# Patient Record
Sex: FEMALE | Race: WHITE | Employment: UNEMPLOYED | URBAN - METROPOLITAN AREA
[De-identification: names, ages, dates, MRNs, and addresses within clinical notes are randomized per-mention and may not be internally consistent; named-entity substitution may affect disease eponyms.]

---

## 2017-04-25 ENCOUNTER — OFFICE VISIT (OUTPATIENT)
Dept: OBGYN CLINIC | Age: 32
End: 2017-04-25

## 2017-04-25 VITALS
HEIGHT: 67 IN | DIASTOLIC BLOOD PRESSURE: 82 MMHG | BODY MASS INDEX: 28.72 KG/M2 | SYSTOLIC BLOOD PRESSURE: 124 MMHG | WEIGHT: 183 LBS

## 2017-04-25 DIAGNOSIS — Z01.419 ENCOUNTER FOR GYNECOLOGICAL EXAMINATION (GENERAL) (ROUTINE) WITHOUT ABNORMAL FINDINGS: Primary | ICD-10-CM

## 2017-04-25 NOTE — PATIENT INSTRUCTIONS

## 2017-04-25 NOTE — MR AVS SNAPSHOT
Visit Information Date & Time Provider Department Dept. Phone Encounter #  
 4/25/2017 10:40 AM Don Keys MD [facility] 90 [id number] Upcoming Health Maintenance Date Due INFLUENZA AGE 9 TO ADULT 8/1/2016 PAP AKA CERVICAL CYTOLOGY 4/4/2021 Allergies as of 4/25/2017  Review Complete On: 4/25/2017 By: Laila Haji LPN Severity Noted Reaction Type Reactions Cefzil [Cefprozil]  01/29/2014    Hives Tolerated z-shari, amoxicillin Current Immunizations  Reviewed on 10/31/2014 Name Date Influenza Vaccine 9/24/2014 Tdap 8/26/2014 Not reviewed this visit Vitals BP Height(growth percentile) Weight(growth percentile) LMP BMI OB Status 124/82 5' 7\" (1.702 m) 183 lb (83 kg) 04/07/2017 (Exact Date) 28.66 kg/m2 Having regular periods Smoking Status Never Smoker BMI and BSA Data Body Mass Index Body Surface Area  
 28.66 kg/m 2 1.98 m 2 Preferred Pharmacy Pharmacy Name Phone [id number] [address] [facility], [address] [place] [address]  [phone] Your Updated Medication List  
  
   
This list is accurate as of: 4/25/17 10:59 AM.  Always use your most recent med list.  
  
  
  
  
 CALCIUM PO Take  by mouth.  
  
 methyldopa 500 mg tablet Commonly known as:  ALDOMET Take 1 Tab by mouth two (2) times a day. PRENATAL VITAMIN PO Take  by mouth. ZyrTEC 10 mg Cap Generic drug:  Cetirizine Take  by mouth. Patient Instructions Well Visit, Ages 25 to 48: Care Instructions Your Care Instructions Physical exams can help you stay healthy. Your doctor has checked your overall health and may have suggested ways to take good care of yourself. He or she also may have recommended tests. At home, you can help prevent illness with healthy eating, regular exercise, and other steps. Follow-up care is a key part of your treatment and safety. Be sure to make and go to all appointments, and call your doctor if you are having problems. It's also a good idea to know your test results and keep a list of the medicines you take. How can you care for yourself at home? · Reach and stay at a healthy weight. This will lower your risk for many problems, such as obesity, diabetes, heart disease, and high blood pressure. · Get at least 30 minutes of physical activity on most days of the week. Walking is a good choice. You also may want to do other activities, such as running, swimming, cycling, or playing tennis or team sports. Discuss any changes in your exercise program with your doctor. · Do not smoke or allow others to smoke around you. If you need help quitting, talk to your doctor about stop-smoking programs and medicines. These can increase your chances of quitting for good. · Talk to your doctor about whether you have any risk factors for sexually transmitted infections (STIs). Having one sex partner (who does not have STIs and does not have sex with anyone else) is a good way to avoid these infections. · Use birth control if you do not want to have children at this time. Talk with your doctor about the choices available and what might be best for you. · Protect your skin from too much sun. When you're outdoors from 10 a.m. to 4 p.m., stay in the shade or cover up with clothing and a hat with a wide brim. Wear sunglasses that block UV rays. Even when it's cloudy, put broad-spectrum sunscreen (SPF 30 or higher) on any exposed skin. · See a dentist one or two times a year for checkups and to have your teeth cleaned. · Wear a seat belt in the car. · Drink alcohol in moderation, if at all. That means no more than 2 drinks a day for men and 1 drink a day for women. Follow your doctor's advice about when to have certain tests. These tests can spot problems early. For everyone · Cholesterol. Have the fat (cholesterol) in your blood tested after age 21. Your doctor will tell you how often to have this done based on your age, family history, or other things that can increase your risk for heart disease. · Blood pressure. Have your blood pressure checked during a routine doctor visit. Your doctor will tell you how often to check your blood pressure based on your age, your blood pressure results, and other factors. · Vision. Talk with your doctor about how often to have a glaucoma test. 
· Diabetes. Ask your doctor whether you should have tests for diabetes. · Colon cancer. Have a test for colon cancer at age 48. You may have one of several tests. If you are younger than 48, you may need a test earlier if you have any risk factors. Risk factors include whether you already had a precancerous polyp removed from your colon or whether your parent, brother, sister, or child has had colon cancer. For women · Breast exam and mammogram. Talk to your doctor about when you should have a clinical breast exam and a mammogram. Medical experts differ on whether and how often women under 50 should have these tests. Your doctor can help you decide what is right for you. · Pap test and pelvic exam. Begin Pap tests at age 24. A Pap test is the best way to find cervical cancer. The test often is part of a pelvic exam. Ask how often to have this test. 
· Tests for sexually transmitted infections (STIs). Ask whether you should have tests for STIs. You may be at risk if you have sex with more than one person, especially if your partners do not wear condoms. For men · Tests for sexually transmitted infections (STIs). Ask whether you should have tests for STIs. You may be at risk if you have sex with more than one person, especially if you do not wear a condom. · Testicular cancer exam. Ask your doctor whether you should check your testicles regularly. · Prostate exam. Talk to your doctor about whether you should have a blood test (called a PSA test) for prostate cancer. Experts differ on whether and when men should have this test. Some experts suggest it if you are older than 39 and are -American or have a father or brother who got prostate cancer when he was younger than 72. When should you call for help? Watch closely for changes in your health, and be sure to contact your doctor if you have any problems or symptoms that concern you. Where can you learn more? Go to http://shikha-aleksandar.info/. Enter P072 in the search box to learn more about \"Well Visit, Ages 25 to 48: Care Instructions. \" Current as of: July 19, 2016 Content Version: 11.2 © 4129-8087 [employer]. Care instructions adapted under license by "[employer] (which disclaims liability or warranty for this information). If you have questions about a medical condition or this instruction, always ask your healthcare professional. [place] [address] any warranty or liability for your use of this information. Introducing [facility]! Dear Kj Pascal: 
Thank you for requesting a REVShare account. Our records indicate that you already have an active REVShare account. You can access your account anytime at [email or web address]. [employer] Did you know that you can access your hospital and ER discharge instructions at any time in REVShare? You can also review all of your test results from your hospital stay or ER visit. Additional Information If you have questions, please visit the Frequently Asked Questions section of the REVShare website at [email or web address] [employer]/. Remember, REVShare is NOT to be used for urgent needs. For medical emergencies, dial 911. Now available from your iPhone and Android! Please provide this summary of care documentation to your next provider. Your primary care clinician is listed as Taina Montalvo. If you have any questions after today's visit, please call [phone].

## 2017-04-25 NOTE — PROGRESS NOTES
[facility]  [email or web address]  [phone]    Julio Tatum MD, [address] [facility]       Annual Gynecologic Exam:  WWE <40  Chief Complaint   Patient presents with    Well Woman         Sandra Nelson is a 32 y.o.  [facility] female who presents for an annual well woman exam.  Patient's last menstrual period was 2017 (exact date). .    With regard to the Gardisil vaccine, she has received all 3 injections. She does not report additional concerns today. Menstrual status:  Her periods are moderate. She does not report dysmenorrhea/painful menses. She does not report irregular bleeding. Sexual history and Contraception:  History   Sexual Activity    Sexual activity: Yes    Partners: Male    Birth control/ protection: Injection     Comment: depo     She always use condoms with sexual activity  She does not reports new sexual partner(s) in the last year. The patient does not request STD testing. We recommended testing per CDC guidelines and at patient request.     Preventive Medicine History:  Her last annual GYN exam was about one year ago. Her most recent Pap smear result: normal was obtained in 2016. Her most recent HR HPV screen was Negative obtained 1 year(s) ago. She does not have a history of NATHAN 2, 3 or cervical cancer. Past Medical History:   Diagnosis Date    Essential hypertension     Family history of Down syndrome 3/27/2014    HX OTHER MEDICAL     gardasil X3    Pap smear for cervical cancer screening 1/15/13; 2016    negative; negative, HPV negative     OB History    Para Term  AB SAB TAB Ectopic Multiple Living   1 1 1 0 0 0 0 0 0 1      # Outcome Date GA Lbr Piter/2nd Weight Sex Delivery Anes PTL Lv   1 Term 10/29/14 37w6d  6 lb 2.6 oz (2.795 kg) M VAGINAL DELI EPIDURAL AN N Y        No past surgical history on file.   Family History   Problem Relation Age of Onset    Breast Cancer Paternal Grandmother  Diabetes Maternal Grandfather     Hypertension Mother     Diabetes Other      uncle    No Known Problems Father      Social History     Social History    Marital status:      Spouse name: N/A    Number of children: N/A    Years of education: N/A     Occupational History    Not on file. Social History Main Topics    Smoking status: Never Smoker    Smokeless tobacco: Never Used    Alcohol use No    Drug use: No    Sexual activity: Yes     Partners: Male     Birth control/ protection: Injection      Comment: depo     Other Topics Concern    Not on file     Social History Narrative       Allergies   Allergen Reactions    Cefzil [Cefprozil] Hives     Tolerated z-shari, amoxicillin       Current Outpatient Prescriptions   Medication Sig    Cetirizine (ZYRTEC) 10 mg cap Take  by mouth.  CALCIUM PO Take  by mouth.  methyldopa (ALDOMET) 500 mg tablet Take 1 Tab by mouth two (2) times a day.  PRENATAL VITS W-CA,FE,FA,<1MG, (PRENATAL VITAMIN PO) Take  by mouth. No current facility-administered medications for this visit.         Patient Active Problem List   Diagnosis Code    HTN (hypertension), benign I10    [facility] E22.1       Review of Systems - History obtained from the patient  Constitutional: negative for weight loss, fever, night sweats  HEENT: negative for hearing loss, earache, congestion, snoring, sorethroat  CV: negative for chest pain, palpitations, edema  Resp: negative for cough, shortness of breath, wheezing  GI: negative for change in bowel habits, abdominal pain, black or bloody stools  : negative for frequency, dysuria, hematuria  GYN: see HPI  MSK: negative for back pain, joint pain, muscle pain  Breast: negative for breast lumps, nipple discharge, galactorrhea  Skin :negative for itching, rash, hives  Neuro: negative for dizziness, headache, confusion, weakness  Psych: negative for anxiety, depression, change in mood  Heme/lymph: negative for bleeding, bruising, pallor    Physical Exam  Visit Vitals    /82    Ht 5' 7\" (1.702 m)    Wt 183 lb (83 kg)    LMP 04/07/2017 (Exact Date)    BMI 28.66 kg/m2       Constitutional  · Appearance: well-nourished, well developed, alert, in no acute distress    HENT  · Head and Face: appears normal    Neck  · Inspection/Palpation: normal appearance, no masses or tenderness  · Lymph Nodes: no lymphadenopathy present  · Thyroid: gland size normal, nontender, no nodules or masses present on palpation    Chest  · Respiratory Effort: breathing labored  · Auscultation: normal breath sounds    Cardiovascular  · Heart:  · Auscultation: regular rate and rhythm without murmur    Breasts  · Inspection of Breasts: breasts symmetrical, no skin changes, no discharge present, nipple appearance normal, no skin retraction present  · Palpation of Breasts and Axillae: no masses present on palpation, no breast tenderness  · Axillary Lymph Nodes: no lymphadenopathy present    Gastrointestinal  · Abdominal Examination: abdomen non-tender to palpation, normal bowel sounds, no masses present  · Liver and spleen: no hepatomegaly present, spleen not palpable  · Hernias: no hernias identified    Genitourinary  · External Genitalia: normal appearance for age, no discharge present, no tenderness present, no inflammatory lesions present, no masses present  · Vagina: normal vaginal vault without central or paravaginal defects, no discharge present, no inflammatory lesions present, no masses present  · Bladder: non-tender to palpation  · Urethra: appears normal  · Cervix: normal   · Uterus: normal size, shape and consistency  · Adnexa: no adnexal tenderness present, no adnexal masses present  · Perineum: perineum within normal limits, no evidence of trauma, no rashes or skin lesions present  · Anus: anus within normal limits, no hemorrhoids present  · Inguinal Lymph Nodes: no lymphadenopathy present    Skin  · General Inspection: no rash, no lesions identified    Neurologic/Psychiatric  · Mental Status:  · Orientation: grossly oriented to person, place and time  · Mood and Affect: mood normal, affect appropriate    Assessment:  32 y.o.  for well woman exam  Encounter Diagnosis   Name Primary?  Encounter for gynecological examination (general) (routine) without abnormal findings Yes       Plan:  The patient was counseled about diet, exercise, healthy lifestyle  We discussed self breast exam  We discussed safer sex practices, condom use and risk factors for sexually transmitted diseases. We discussed current pap smear and HR HPV testing guidelines. We recommend follow up one year for routine annual gynecologic exam or sooner prn  We recommend routine follow up with her primary care doctor for management of chronic medical problems and non-gynecologic concerns  Handouts were given to the patient  We discussed calcium/vitamin D/weight bearing exercise and osteoporosis prevention  PNV  Declines contraception. Normal endo fu for elev PRL    Folllow up:  [x] return for annual well woman exam in one year or sooner if she is having problems  [] follow up and ultrasound  [] 6 months  [] 3 months  [] 6 weeks   [] 1 month    No orders of the defined types were placed in this encounter. No results found for any visits on 17.

## 2017-09-26 ENCOUNTER — OFFICE VISIT (OUTPATIENT)
Dept: ENDOCRINOLOGY | Age: 32
End: 2017-09-26

## 2017-09-26 VITALS
HEIGHT: 67 IN | HEART RATE: 99 BPM | RESPIRATION RATE: 16 BRPM | DIASTOLIC BLOOD PRESSURE: 84 MMHG | BODY MASS INDEX: 28.41 KG/M2 | SYSTOLIC BLOOD PRESSURE: 105 MMHG | WEIGHT: 181 LBS

## 2017-09-26 DIAGNOSIS — E22.1 HYPERPROLACTINEMIA (HCC): Primary | ICD-10-CM

## 2017-09-26 NOTE — PROGRESS NOTES
Endocrinology Visit    CC: elevated prolactin level    History of Present Illness:  Bashir Pereira is an 32 y.o. female with h/o hyperprolactinemia. I saw her in initial consultation in December 2016 after her Ob/gyn checked a prolactin level in September due to irregular menses and it was elevated at 24. Repeat level done in October was 33. On the labs I did, it was normal at 12 and her menses had become more regular so I did not recommend pursuing MRI or starting dopamine agonist therapy. LH, FSH, and estradiol were normal for luteal phase, and TSH/FT4 were normal. I did let her know we should consider an alternate antihypertensive (one not implicated in raising serum prolactin). Today, she reports feeling well and has lost about 18 lbs by working on diet and exercise. Her and her  are thinking about trying to conceive again in early 2018 so she wanted to follow-up today to make sure her hormone levels are optimized for fertility. She denies headaches, visual changes or decrease in peripheral vision. She denies galactorrhea. Only medications are a prenatal vitamin and methyldopa which she continues to take for HTN that was diagnosed during her first pregnancy. She is interested in fertility in the near future so decided to continue this since it is safe in pregnancy and breastfeeding. She is having regular cycles but feels like the interval between each is stretching out. She is tracking these and length is from 26-34 days, typically has normal flow for 5 days. LMP was Sept 16th. She denies change in energy level but does feel her hair is shedding more. Denies nausea, vomiting, lightheadedness, abnormal fatigue, or loss of appetite. No heat or cold intolerance, unexplained weight loss or gain. Family history is negative for pituitary tumor or other abnormalities concerning for MEN syndrome.     ROS: as above, otherwise a 7 pt review is negative     Problem list:  Patient Active Problem List Diagnosis Code    HTN (hypertension), benign I10    [facility]       Medical history:  Past Medical History:   Diagnosis Date    Essential hypertension     Family history of Down syndrome 3/27/2014    HX OTHER MEDICAL     gardasil X3    Pap smear for cervical cancer screening 1/15/13; 04/04/2016    negative; negative, HPV negative       Past surgical history:  History reviewed. No pertinent surgical history. Medications:    Current Outpatient Prescriptions:     Cetirizine (ZYRTEC) 10 mg cap, Take  by mouth., Disp: , Rfl:     CALCIUM PO, Take  by mouth., Disp: , Rfl:     methyldopa (ALDOMET) 500 mg tablet, Take 1 Tab by mouth two (2) times a day., Disp: 60 Tab, Rfl: 2    PRENATAL VITS W-CA,FE,FA,<1MG, (PRENATAL VITAMIN PO), Take  by mouth., Disp: , Rfl:     Allergies: Allergies   Allergen Reactions    Cefzil [Cefprozil] Hives     Tolerated z-shari, amoxicillin       Social History:  Social History     Social History    Marital status:      Spouse name: N/A    Number of children: N/A    Years of education: N/A     Occupational History    Not on file. Social History Main Topics    Smoking status: Never Smoker    Smokeless tobacco: Never Used    Alcohol use No    Drug use: No    Sexual activity: Yes     Partners: Male     Birth control/ protection: Condom      Comment: depo     Other Topics Concern    Not on file     Social History Narrative       Family History:  Family History   Problem Relation Age of Onset    Breast Cancer Paternal Grandmother     Diabetes Maternal Grandfather     Hypertension Mother     Diabetes Other      uncle    No Known Problems Father        Physical exam:  Visit Vitals    /84    Pulse 99    Resp 16    Ht 5' 7\" (1.702 m)    Wt 181 lb (82.1 kg)    LMP 09/16/2017    BMI 28.35 kg/m2     Gen: Healthy appearing and in NAD  Heent: Extra ocular muscles intact. No lid lag, stare. No scleral or conjunctival injection.  Mucous membranes moist and clear. Neck: supple. No supraclavicular or submandibular fullness. Thyroid: moves well with swallowing, normal size, normal texture, no masses or nodules appreciated. Heme/lymph: no cervical, supraclavicular or submandibular lymphadenopathy appreciated. Pulmonary: clear to auscultation bilaterally, no wheezes/rhonchi or rales  Cardiovascular: regular rate and rhythm, no murmurs, rubs or gallops. Extremities: no clubbing, cyanosis or edema. Neuro: no tremor of outstretched hands, reflexes are normal with normal relaxation phase. Visual fields full to confrontation. Normal gait, normal concentration, speech clear. Skin: normal texture, no rash. No significant acne or hirsutism.   Psyche: normal affect with good insight into her medical conditions    Pertinent lab review:    Component      Latest Ref Rn 10/3/2016 9/2/2016 9/2/2016          10:22 AM 10:29 AM 10:29 AM   TSH      0.450 - 4.500 uIU/mL   3.240   Prolactin      4.8 - 23.3 ng/mL 33.3 (H) 24.0 (H)      Component      Latest Ref Rn 12/8/2016 12/8/2016 12/8/2016 12/8/2016           9:41 AM  9:41 AM  9:41 AM  9:41 AM   Prolactin, Serum (ICMA)*      ng/mL       Monemeric Prolactin (ICMA)*      ng/mL       Percent Macroprolactin      %       TSH      0.450 - 4.500 uIU/mL    3.160   T4, Free      0.82 - 1.77 ng/dL    0.96   Luteinizing hormone      mIU/mL   2.3    FSH      mIU/mL   3.3    Prolactin      4.8 - 23.3 ng/mL       Estradiol      pg/mL  77.4     Insulin-Like Growth Factor I      73 - 243 ng/mL 195        Component      Latest Ref Rn 12/8/2016 12/8/2016           9:41 AM  9:41 AM   Prolactin, Serum (ICMA)*      ng/mL 15    Monemeric Prolactin (ICMA)*      ng/mL 14    Percent Macroprolactin      % 7    TSH      0.450 - 4.500 uIU/mL     T4, Free      0.82 - 1.77 ng/dL     Luteinizing hormone      mIU/mL     FSH      mIU/mL     Prolactin      4.8 - 23.3 ng/mL  16.0   Estradiol      pg/mL     Insulin-Like Growth Factor I      73 - 243 ng/mL       Assessment and Plan:  Staci Medrano is a pleasant 32 y.o. female here for h/o elevated prolactin level in the setting of irregular menses. When I saw her in December, her cycles had resumed at regular intervals and prolactin was normal. It is unclear if the period of irregular cycles was due to hyperprolactinemia or readjustment of her HPG axis after depo injections and cessation of breastfeeding. I suspect the elevated prolactin is most likely is due to her antihypertensive methyldopa. Will recheck this today along with LH and FSH (should be follicular phase range). We discussed that if prolactin is elevated, we could try bromocriptine to decrease her levels and optimize fertility. Alternatively, she could stop methyldopa (possibly switch to another antihypertensive agent safe in pregnancy like labetalol) and repeat the prolactin level in 1 month. If it remains elevated despite cessation of methyldopa will obtain pituitary MRI to evaluate for adenoma. Additionally, will recheck TSH/FT4 with thyroid antibodies. Her last TSH was above 2.5 and if this is still uptrending with positive antibodies, I would recommend starting levothyroxine. I spent 15 minutes with the patient today and > 50% of the time was spent counseling the patient about hyperprolactinemia: its pathophysiology, potential causes, diagnosis, and potential therapies. Thank you for the opportunity to partake in this patients care. RTC 6 months or sooner if needed.      Greg Khan MD  [facility]  130 Replaced by [facility]

## 2017-09-26 NOTE — MR AVS SNAPSHOT
Visit Information Date & Time Provider Department Dept. Phone Encounter #  
 9/26/2017  8:30 AM Leonarda Salazarey, [address] [facility] Diabetes and Endocrinology [phone] Follow-up Instructions Return in about 6 months (around 3/26/2018). Your Appointments 4/26/2018 10:20 AM  
ESTABLISHED PATIENT with MD Matthew Mariscal ([address]) Appt Note: annual exam TP  
 [address] [facility] [address] [facility] [address]  
[facility] [address] Upcoming Health Maintenance Date Due INFLUENZA AGE 9 TO ADULT 8/1/2017 PAP AKA CERVICAL CYTOLOGY 4/4/2021 DTaP/Tdap/Td series (2 - Td) 8/26/2024 Allergies as of 9/26/2017  Review Complete On: 9/26/2017 By: Mitchell Rivera Severity Noted Reaction Type Reactions Cefzil [Cefprozil]  01/29/2014    Hives Tolerated z-shari, amoxicillin Current Immunizations  Reviewed on 10/31/2014 Name Date Influenza Vaccine 9/24/2014 Tdap 8/26/2014 Not reviewed this visit You Were Diagnosed With   
  
 Codes Comments Hyperprolactinemia ([facility]    -  Primary ICD-10-CM: E22.1 ICD-9-CM: 253.1 Vitals BP Pulse Resp Height(growth percentile) Weight(growth percentile) LMP  
 105/84 99 16 5' 7\" (1.702 m) 181 lb (82.1 kg) 09/16/2017 BMI OB Status Smoking Status 28.35 kg/m2 Having regular periods Never Smoker Vitals History BMI and BSA Data Body Mass Index Body Surface Area  
 28.35 kg/m 2 1.97 m 2 Preferred Pharmacy Pharmacy Name Phone [id number] [phone] , [address] [place] [address]  [phone] Your Updated Medication List  
  
   
This list is accurate as of: 9/26/17  8:46 AM.  Always use your most recent med list.  
  
  
  
  
 CALCIUM PO Take  by mouth.  
  
 methyldopa 500 mg tablet Commonly known as:  ALDOMET Take 1 Tab by mouth two (2) times a day. PRENATAL VITAMIN PO Take  by mouth. ZyrTEC 10 mg Cap Generic drug:  Cetirizine Take  by mouth. We Performed the Following [facility] AND LH [07115 CPT(R)] PROLACTIN [96461 CPT(R)] THYROID ANTIBODY PANEL [97632 CPT(R)] TSH AND FREE T4 [00358 CPT(R)] Follow-up Instructions Return in about 6 months (around 3/26/2018). Introducing \[facility]\"" & [facility] SERVICES! Dear Dorys Tejeda: 
Thank you for requesting a [employer] account. Our records indicate that you already have an active [employer] account. You can access your account anytime at [email or web address] Did you know that you can access your hospital and ER discharge instructions at any time in [employer]? You can also review all of your test results from your hospital stay or ER visit. Additional Information If you have questions, please visit the Frequently Asked Questions section of the [employer] website at [email or web address] Remember, [employer] is NOT to be used for urgent needs. For medical emergencies, dial 911. Now available from your iPhone and Android! Please provide this summary of care documentation to your next provider. Your primary care clinician is listed as Maggie Lyon. If you have any questions after today's visit, please call [phone].

## 2017-09-27 ENCOUNTER — PATIENT MESSAGE (OUTPATIENT)
Dept: ENDOCRINOLOGY | Age: 32
End: 2017-09-27

## 2017-09-27 DIAGNOSIS — E22.1 HYPERPROLACTINEMIA (HCC): Primary | ICD-10-CM

## 2017-09-27 LAB
FSH SERPL-ACNC: 5.6 MIU/ML
LH SERPL-ACNC: 5.3 MIU/ML
PROLACTIN SERPL-MCNC: 30.9 NG/ML (ref 4.8–23.3)
T4 FREE SERPL-MCNC: 1.28 NG/DL (ref 0.82–1.77)
THYROGLOB AB SERPL-ACNC: <1 IU/ML (ref 0–0.9)
THYROPEROXIDASE AB SERPL-ACNC: 10 IU/ML (ref 0–34)
TSH SERPL DL<=0.005 MIU/L-ACNC: 2.46 UIU/ML (ref 0.45–4.5)

## 2017-10-02 NOTE — TELEPHONE ENCOUNTER
From: Dom Lyons MD  To: Shruti Baptiste  Sent: 9/27/2017 8:44 AM EDT  Subject: results    Hi Sandra,  Regarding the elevated prolactin, I suspect this is most likely due to the methyldopa. The only way to find out is to have you stop the medication for a few weeks and repeat the level. We could possibly switch you to labetolol, another antihypertensive which is safe during pregnancy. If this takes care of the prolactin elevation, it may prevent us from having to start bromocriptine. Let me know your thoughts.   Thanks, Ray Guzman

## 2017-11-30 LAB — PROLACTIN SERPL-MCNC: 10.7 NG/ML (ref 4.8–23.3)

## 2017-12-04 ENCOUNTER — OFFICE VISIT (OUTPATIENT)
Dept: ENDOCRINOLOGY | Age: 32
End: 2017-12-04

## 2017-12-04 VITALS
HEART RATE: 100 BPM | BODY MASS INDEX: 30.76 KG/M2 | RESPIRATION RATE: 16 BRPM | DIASTOLIC BLOOD PRESSURE: 100 MMHG | HEIGHT: 67 IN | OXYGEN SATURATION: 99 % | WEIGHT: 196 LBS | SYSTOLIC BLOOD PRESSURE: 133 MMHG

## 2017-12-04 DIAGNOSIS — E22.1 HYPERPROLACTINEMIA (HCC): Primary | ICD-10-CM

## 2017-12-04 DIAGNOSIS — I10 HTN (HYPERTENSION), BENIGN: ICD-10-CM

## 2017-12-04 NOTE — PROGRESS NOTES
Lab Results   Component Value Date/Time    TSH 2.460 09/26/2017 09:22 AM    T4, Free 1.28 09/26/2017 09:22 AM

## 2017-12-04 NOTE — PROGRESS NOTES
Endocrinology Visit    CC: elevated prolactin level    History of Present Illness:  Cece Chavez is an 28 y.o. female with h/o hyperprolactinemia. I saw her in initial consultation in December 2016 after her Ob/gyn checked a prolactin level in September due to irregular menses and it was elevated at 24. Repeat level done in October was 33. On the labs I did, it was normal at 12 and her menses had become more regular so I did not recommend pursuing MRI or starting dopamine agonist therapy. LH, FSH, and estradiol were normal for luteal phase, and TSH/FT4 were normal. We discussed a trial of stopping the methyldopa to see if prolactin would decrease off the medication. She had the bloodwork done last week and prolactin was normal at 10. She stopped taking the methyldopa shortly after her last visit in late Sept. She is not on an alternative BP agent and blood pressure readings at home have ranged in the 759M-590C systolic, 64O-61I diastolic. She had lost 18 lbs at her last visit with me but has since regained about 15 lbs which she attributes to a lapse in her diet/exercise routine that helped her lose weight. Her and her  are thinking about trying to conceive again in early 2018 so she wanted to follow-up today to make sure her hormone levels are optimized for fertility. She denies headaches, visual changes or decrease in peripheral vision. She denies galactorrhea. Only taking a prenatal vitamin at present. She is having regular cycles - feels like the interval is now getting back to normal Q28d. LMP was Nov 19th. Denies nausea, vomiting, lightheadedness, abnormal fatigue, or loss of appetite.     ROS: as above, otherwise a 7 pt review is negative     Problem list:  Patient Active Problem List   Diagnosis Code    HTN (hypertension), benign I10    Hyperprolactinemia ([facility] E22.1       Medical history:  Past Medical History:   Diagnosis Date    Essential hypertension     Family history of Down syndrome 3/27/2014    HX OTHER MEDICAL     gardasil X3    Pap smear for cervical cancer screening 1/15/13; 04/04/2016    negative; negative, HPV negative       Past surgical history:  History reviewed. No pertinent surgical history. Medications:    Current Outpatient Prescriptions:     Cetirizine (ZYRTEC) 10 mg cap, Take  by mouth., Disp: , Rfl:     CALCIUM PO, Take  by mouth., Disp: , Rfl:     PRENATAL VITS W-CA,FE,FA,<1MG, (PRENATAL VITAMIN PO), Take  by mouth., Disp: , Rfl:     Allergies: Allergies   Allergen Reactions    Cefzil [Cefprozil] Hives     Tolerated z-shari, amoxicillin       Social History:  Social History     Social History    Marital status:      Spouse name: N/A    Number of children: N/A    Years of education: N/A     Occupational History    Not on file. Social History Main Topics    Smoking status: Never Smoker    Smokeless tobacco: Never Used    Alcohol use No    Drug use: No    Sexual activity: Yes     Partners: Male     Birth control/ protection: Condom      Comment: depo     Other Topics Concern    Not on file     Social History Narrative       Family History:  Family History   Problem Relation Age of Onset    Breast Cancer Paternal Grandmother     Diabetes Maternal Grandfather     Hypertension Mother     Diabetes Other      uncle    No Known Problems Father        Physical exam:  Visit Vitals    BP (!) 133/100    Pulse 100    Resp 16    Ht 5' 7\" (1.702 m)    Wt 196 lb (88.9 kg)    LMP 11/19/2017    SpO2 99%    BMI 30.7 kg/m2     Gen: Healthy appearing and in NAD  Heent: Extra ocular muscles intact. No lid lag, stare. No scleral or conjunctival injection. Mucous membranes moist and clear. Neck: supple. No supraclavicular or submandibular fullness. Thyroid: moves well with swallowing, normal size, normal texture, no masses or nodules appreciated. Heme/lymph: no cervical, supraclavicular or submandibular lymphadenopathy appreciated.   Pulmonary: clear to auscultation bilaterally, no wheezes/rhonchi or rales  Cardiovascular: regular rate and rhythm, no murmurs, rubs or gallops. Extremities: no clubbing, cyanosis or edema. Neuro: no tremor of outstretched hands, reflexes are normal with normal relaxation phase. Visual fields full to confrontation. Normal gait, normal concentration, speech clear. Skin: normal texture, no rash. No significant acne or hirsutism. Psyche: normal affect with good insight into her medical conditions    Pertinent lab review:    Component      Latest Ref Rng & Units 11/29/2017 9/26/2017 12/8/2016 10/3/2016           9:02 AM  9:22 AM  9:41 AM 10:22 AM   Prolactin      4.8 - 23.3 ng/mL 10.7 30.9 (H) 16.0 33.3 (H)     Component      Latest Ref Rng & Units 9/2/2016          10:29 AM   Prolactin      4.8 - 23.3 ng/mL 24.0 (H)       Assessment and Plan:  Stacia Hui is a pleasant 28 y.o. female here for h/o elevated prolactin level in the setting of irregular menses and methyldopa use. I suspect the elevated prolactin was most likely is due to her antihypertensive methyldopa, since it has normalized since stopping the medication. Would not resume methlydopa unless needed during pregnancy, since she is interested in fertility and high prolactin levels can pose a barrier to conceiving. For now, encouraged weight loss with lifestyle measures to help decrease her blood pressure. Labetalol could be considered instead of methyldopa should she need an agent to control BP while pursuing fertility. I spent 15 minutes with the patient today and > 50% of the time was spent counseling the patient about hyperprolactinemia: its pathophysiology, potential causes, diagnosis, and potential therapies. Thank you for the opportunity to partake in this patients care. RTC as needed.      Radha Cuellar MD  [address] [place] [address] Diabetes & Endocrinology  [facility]

## 2018-04-26 ENCOUNTER — OFFICE VISIT (OUTPATIENT)
Dept: OBGYN CLINIC | Age: 33
End: 2018-04-26

## 2018-04-26 VITALS
WEIGHT: 198 LBS | SYSTOLIC BLOOD PRESSURE: 126 MMHG | HEIGHT: 67 IN | DIASTOLIC BLOOD PRESSURE: 84 MMHG | BODY MASS INDEX: 31.08 KG/M2

## 2018-04-26 DIAGNOSIS — Z34.80 PRENATAL CARE OF MULTIGRAVIDA, ANTEPARTUM: ICD-10-CM

## 2018-04-26 DIAGNOSIS — O16.9 HYPERTENSION DURING PREGNANCY, ANTEPARTUM, UNSPECIFIED HYPERTENSION IN PREGNANCY TYPE: Primary | ICD-10-CM

## 2018-04-26 LAB
ANTIBODY SCREEN, EXTERNAL: NEGATIVE
CHLAMYDIA, EXTERNAL: NEGATIVE
HBSAG, EXTERNAL: NEGATIVE
HCT, EXTERNAL: 40
HGB, EXTERNAL: 13.6
HIV, EXTERNAL: NON REACTIVE
N. GONORRHEA, EXTERNAL: NEGATIVE
PLATELET CNT,   EXTERNAL: 315
RUBELLA, EXTERNAL: NORMAL
RUBELLA, EXTERNAL: NORMAL
T. PALLIDUM, EXTERNAL: NORMAL

## 2018-04-26 NOTE — PROGRESS NOTES
[address] [facility]  [email or web address]  [phone]    Jyoti Centeno MD, [address] [facility]     Chief complaint:  Irregular cycles  Last cycle; Patient's last menstrual period was 2018 (exact date). This is a new concern and an evaluation is planned. [place] d/c because link of inc PRL. Current pregnancy history:  Rosi Cerna is a , 28 y.o. female [facility]   She presents for the evaluation of irregular menses and a positive pregnancy test.    LMP history:  Patient's last menstrual period was 2018 (exact date). .  The date of the beginning of her last menstrual period is certain. Her menses are regular. Her cycles occur about every 4 weeks. A urine pregnancy test was positive about 4 weeks ago. She was not using contraception at the estimated time of conception. Based on her LMP, her EGA is 9 weeks,3 days with and EDC of 18. Ultrasound data:  She had an ultrasound today which revealed a viable garcias pregnancy with a gestational age of 10 weeks and 1 days giving an EDC of 18. Pregnancy symptoms:  She reports urinary frequency, breast tenderness, nausea. She denies vomiting, pelvic pain, bleeding. Since she found out she is pregnant, she has gained, 5 lbs. She reports her prepregnancy weight as 193 pounds. Relevant past pregnancy history:  She has the following pregnancy history:none. She does not have a history of  delivery. She does not have a history of a prior  section. Relevant past medical history:(relevant to this pregnancy): CF testing negative 3/27/2014    none     Pap smear history:  Last pap smear: 2016  Results: within normal limits    Occupational history  Her occupation is: unemployed. Substance history:   She does not report current tobacco use. She does not report current alcohol use. She does not report current drug use. Exposure history:    There are not indoor cat(s) in the home. The patient was instructed not to change cat litter boxes during pregnancy. She does not report close contact with children on a regular basis. She has chicken pox or the vaccine in the past.   Patient does not report issues with domestic violence. Genetic Screening/Teratology Counseling:   (Includes patient, baby's father, or anyone in either family with:)  3.  Patient's age >/= 28 at EDC?--32      FOB age: 28years old. 2.  Thalassemia ([facility], [facility], [facility], or  background): MCV<80?--no  3. Neural tube defect (meningomyelocele, spina bifida, anencephaly)? --no  4. Congenital heart defect?--no  5. Down syndrome?--first cousin male with DS  6.  Jerson-Sachs ([address] [place] [address])? --no  7. Canavan's Disease?--no  8. Familial Dysautonomia?--no   9. Sickle cell disease or trait ()? --no   Has she been tested for sickle trait: No  10. Hemophilia or other blood disorders?--no  11. Muscular dystrophy?--no  12. Cystic fibrosis? --no  13. Craighead's Chorea?--no  14. Mental retardation/autism (if yes was person tested for Fragile X)?-no  15. Other inherited genetic or chromosomal disorder?- no  16. Maternal metabolic disorder (DM, PKU, etc)? --no  17. Patient or FOB with a child with a birth defect not listed above?--no  17a. Patient or FOB with a birth defect themselves?--no  25. Recurrent pregnancy loss, or stillbirth?--no  19. Any medications since LMP other than prenatal vitamins (include vitamins, supplements, OTC meds, drugs, alcohol)? -- PNV  20. Any other genetic/environmental exposure to discuss?--no. Infection History:  1. Lives with someone with TB or TB exposed?--no  2. Patient or partner has history of genital herpes?--no  3. Rash or viral illness since LMP?--no  4. History of STD (GC, CT, HPV, syphilis, HIV)? --no  5. Have you received a flu vaccine for the most recent flu season? -- yes  6.   Have you or your sexual partner(s) travelled to a [place] area in the last 6 months? -- no    Past Medical History:   Diagnosis Date    Essential hypertension     Family history of Down syndrome 3/27/2014    HX OTHER MEDICAL     gardasil X3    Pap smear for cervical cancer screening 1/15/13; 2016    negative; negative, HPV negative     No past surgical history on file. Social History     Occupational History    Not on file. Social History Main Topics    Smoking status: Never Smoker    Smokeless tobacco: Never Used    Alcohol use No    Drug use: No    Sexual activity: Yes     Partners: Male     Birth control/ protection: Condom      Comment: depo     Family History   Problem Relation Age of Onset    Breast Cancer Paternal Grandmother     Diabetes Maternal Grandfather     Hypertension Mother     Diabetes Other      uncle    No Known Problems Father      OB History    Para Term  AB Living   1 1 1 0 0 1   SAB TAB Ectopic Molar Multiple Live Births   0 0 0  0 1      # Outcome Date GA Lbr Piter/2nd Weight Sex Delivery Anes PTL Lv   1 Term 10/29/14 37w6d  6 lb 2.6 oz (2.795 kg) M VAGINAL DELI EPIDURAL AN N ARTHUR        Allergies   Allergen Reactions    Cefzil [Cefprozil] Hives     Tolerated z-shari, amoxicillin     Prior to Admission medications    Medication Sig Start Date End Date Taking? Authorizing Provider   CALCIUM PO Take  by mouth. Yes Historical Provider   PRENATAL VITS W-CA,FE,FA,<1MG, (PRENATAL VITAMIN PO) Take  by mouth. Yes Historical Provider   Cetirizine (ZYRTEC) 10 mg cap Take  by mouth.     Historical Provider        Review of Systems - History obtained from the patient  Constitutional: negative for weight loss, fever, night sweats  HEENT: negative for hearing loss, earache, congestion, snoring, sorethroat  CV: negative for chest pain, palpitations, edema  Resp: negative for cough, shortness of breath, wheezing  GI: negative for change in bowel habits, abdominal pain, black or bloody stools  : negative for frequency, dysuria, hematuria, vaginal discharge  MSK: negative for back pain, joint pain, muscle pain  Breast: negative for breast lumps, nipple discharge, galactorrhea  Skin :negative for itching, rash, hives  Neuro: negative for dizziness, headache, confusion, weakness  Psych: negative for anxiety, depression, change in mood  Heme/lymph: negative for bleeding, bruising, pallor    Objective:  Visit Vitals    /84 (BP 1 Location: Left arm)    Ht 5' 7\" (1.702 m)    Wt 198 lb (89.8 kg)    LMP 02/19/2018 (Exact Date)    BMI 31.01 kg/m2       Physical Exam:   Constitutional  · Appearance: well-nourished, well developed, alert, in no acute distress    HENT  · Head  · Face: appears normal  · Eyes: appear normal  · Ears: normal  · Mouth: normal  · Lips: no lesions    Neck  · Inspection/Palpation: normal appearance, no masses or tenderness  · Lymph Nodes: no lymphadenopathy present  · Thyroid: gland size normal, nontender, no nodules or masses present on palpation    Chest  · Respiratory Effort: breathing unlabored  · Auscultation: normal breath sounds    Cardiovascular  · Heart:  · Auscultation: regular rate and rhythm without murmur    Breasts  · Inspection of Breasts: breasts symmetrical, no skin changes, no discharge present, nipple appearance normal, no skin retraction present  · Palpation of Breasts and Axillae: no masses present on palpation, no breast tenderness  · Axillary Lymph Nodes: no lymphadenopathy present    Gastrointestinal  · Abdominal Examination: abdomen non-tender to palpation, normal bowel sounds, no masses present  · Liver and spleen: no hepatomegaly present, spleen not palpable  · Hernias: no hernias identified    Genitourinary  · External Genitalia: normal appearance for age, no discharge present, no tenderness present, no inflammatory lesions present, no masses present, no atrophy present  · Vagina: normal vaginal vault without central or paravaginal defects, no discharge present, no inflammatory lesions present, no masses present  · Bladder: non-tender to palpation  · Urethra: appears normal  · Cervix: normal appearing with discharge or lesions, os closed  · Uterus: enlarged, normal shape, soft  · Adnexa: no adnexal tenderness present, no adnexal masses present  · Perineum: perineum within normal limits, no evidence of trauma, no rashes or skin lesions present  · Anus: anus within normal limits, no hemorrhoids present  · Inguinal Lymph Nodes: no lymphadenopathy present    Skin  · General Inspection: no rash, no lesions identified    Neurologic/Psychiatric  · Mental Status:  · Orientation: grossly oriented to person, place and time  · Mood and Affect: mood normal, affect appropriate    Assessment:   Irregular cycles  Encounter Diagnoses   Name Primary?  Hypertension during pregnancy, antepartum, unspecified hypertension in pregnancy type Yes    Prenatal care of multigravida, antepartum      Due date: LMP EDC    Plan:   We discussed options of genetic screening and diagnostic testing including:  CF testing, CVS, amniocentesis first trimester screening/NT, MSAFP, and NIPT (handout given to patient for review and consent)  She is not interested in prenatal genetic testing of her fetus. She is considering NT but wants to check on coverage  acog exercise h/o  Baseline 24 hr urine  Plan: FS  The course of pregnancy discussed including visit schedule, ultrasounds, lab testing, etc.  Pt advised to avoid alcoholic beverages and illicit/recreational drugs use  Recommend taking prenatal vitamins or folic acid daily with DHA/fish oil. The hospital and practice style discussed with coverage system. We discussed nutrition, toxoplasmosis precautions, sexual activity, exercise, need for influenza vaccine, environmental and work hazards, travel advice, screen for domestic violence, need for seat belts.   We discussed seafood, unpasteurized dairy products, deli meat, artificial sweeteners, and caffeine intake. We recommend avoiding chemical and toxin exposures when possible. Information on prenatal and breastfeeding classes given. Information on circumcision given  Patient encouraged not to smoke. Discussed current prescription drug use. Given medication list.  Discussed the use of over the counter medications and chemicals. She is advised to contact her MD with any questions. Pt understands risk of hemorrhage during pregnancy and post delivery and would accept blood products if necessary in life-threatening emergencies  We discussed signs and symptoms of abnormal pregnancies and miscarriage. Handouts given to pt. Pt considering NT    Physician review of ultrasound performed by technician  Today's ultrasound report and images were reviewed and discussed with the patient. Please see images and imaging report entered by technician in PACS for more detail and progress note and diagnosis entered by MD.    Renetta Spence MD    Orders Placed This Encounter    CULTURE, URINE    CHLAMYDIA/GC PCR    HEP B SURFACE AG    HIV SCREEN, [address] [place] [address]. W/REFLEX CONFIRM    CBC W/O DIFF    RUBELLA AB, IGG    T PALLIDUM SCREEN W/REFLEX    TYPE, ABO & RH    ANTIBODY SCREEN     Follow-up Disposition:  Return in about 4 weeks (around 5/24/2018) for Follow up OB visit. TA ULTRASOUND PERFORMED  A SINGLE VIABLE 9W1D IUP IS SEEN WITH NORMAL CARDIAC RHYTHM. GESTATIONAL AGE BASED ON TODAYS ULTRASOUND. A NORMAL YOLK SAC IS SEEN. RIGHT OVARY APPEARS WITHIN NORMAL LIMITS. LEFT OVARY APPEARS WITHIN NORMAL LIMITS. NO FREE FLUID SEEN IN THE CDS.

## 2018-04-26 NOTE — PATIENT INSTRUCTIONS
Weeks 6 to 10 of Your Pregnancy: Care Instructions  Your Care Instructions    Congratulations on your pregnancy. This is an exciting and important time for you. During the first 6 to 10 weeks of your pregnancy, your body goes through many changes. Your baby grows very fast, even though you cannot feel it yet. You may start to notice that you feel different, both in your body and your emotions. Because each woman's pregnancy is unique, there is no right way to feel. You may feel the healthiest you have ever been, or you may feel tired or sick to your stomach (\"morning sickness\"). These early weeks are a time to make healthy choices and to eat the best foods for you and your baby. This care sheet will give you some ideas. This is also a good time to think about birth defects testing. These are tests done during pregnancy to look for possible problems with the baby. First trimester tests for birth defects can be done between 8 and 17 weeks of pregnancy, depending on the test. Talk with your doctor about what kinds of tests are available. Follow-up care is a key part of your treatment and safety. Be sure to make and go to all appointments, and call your doctor if you are having problems. It's also a good idea to know your test results and keep a list of the medicines you take. How can you care for yourself at home? Eat well  · Eat at least 3 meals and 2 healthy snacks every day. Eat fresh, whole foods, including:  ¨ 7 or more servings of bread, tortillas, cereal, rice, pasta, or oatmeal.  ¨ 3 or more servings of vegetables, especially leafy green vegetables. ¨ 2 or more servings of fruits. ¨ 3 or more servings of milk, yogurt, or cheese. ¨ 2 or more servings of meat, turkey, chicken, fish, eggs, or dried beans. · Drink plenty of fluids, especially water. Avoid sodas and other sweetened drinks. · Choose foods that have important vitamins for your baby, such as calcium, iron, and folate.   ¨ Dairy products, tofu, canned fish with bones, almonds, broccoli, dark leafy greens, corn tortillas, and fortified orange juice are good sources of calcium. ¨ Beef, poultry, liver, spinach, lentils, dried beans, fortified cereals, and dried fruits are rich in iron. ¨ Dark leafy greens, broccoli, asparagus, liver, fortified cereals, orange juice, peanuts, and almonds are good sources of folate. · Avoid foods that could harm your baby. ¨ Do not eat raw or undercooked meat, chicken, or fish (such as sushi or raw oysters). ¨ Do not eat raw eggs or foods that contain raw eggs, such as Caesar dressing. ¨ Do not eat soft cheeses and unpasteurized dairy foods, such as Brie, feta, or blue cheese. ¨ Do not eat fish that contains a lot of mercury, such as shark, swordfish, tilefish, or anthony mackerel. Do not eat more than 6 ounces of tuna each week. ¨ Do not eat raw sprouts, especially alfalfa sprouts. ¨ Cut down on caffeine, such as coffee, tea, and cola. Protect yourself and your baby  · Do not touch steve litter or cat feces. They can cause an infection that could harm your baby. · High body temperature can be harmful to your baby. So if you want to use a sauna or hot tub, be sure to talk to your doctor about how to use it safely. [place] with morning sickness  · Sip small amounts of water, juices, or shakes. Try drinking between meals, not with meals. · Eat 5 or 6 small meals a day. Try dry toast or crackers when you first get up, and eat breakfast a little later. · Avoid spicy, greasy, and fatty foods. · When you feel sick, open your windows or go for a short walk to get fresh air. · Try nausea wristbands. These help some women. · Tell your doctor if you think your prenatal vitamins make you sick. Where can you learn more? Go to http://katerin.info/. Enter G112 in the search box to learn more about \"Weeks 6 to 10 of Your Pregnancy: Care Instructions. \"  Current as of: March 16, 2017  Content Version: 11.4  © 1391-0101 Healthwise, Incorporated. Care instructions adapted under license by [employer] (which disclaims liability or warranty for this information). If you have questions about a medical condition or this instruction, always ask your healthcare professional. [address] any warranty or liability for your use of this information.

## 2018-04-26 NOTE — MR AVS SNAPSHOT
[address] [facility] [address] [facility] 
[phone] Patient: Poly Wright MRN: [id number] UOS:[birth year] Visit Information Date & Time Provider Department Dept. Phone Encounter #  
 4/26/2018  9:40 AM MD Matthew Colon [phone] [id number] Follow-up Instructions Return in about 4 weeks (around 5/24/2018) for Follow up OB visit. Your Appointments 4/26/2018 10:20 AM  
ESTABLISHED PATIENT with MD Matthew Colon ([address]) Appt Note: annual exam TP  
 [address] [facility] 99 [address]  
[facility] 31 [facility]3 [address] Upcoming Health Maintenance Date Due Influenza Age 5 to Adult 8/1/2017 PAP AKA CERVICAL CYTOLOGY 4/4/2021 Allergies as of 4/26/2018  Review Complete On: 4/26/2018 By: Miguel Smith MD  
  
 Severity Noted Reaction Type Reactions Cefzil [Cefprozil]  01/29/2014    Hives Tolerated z-shari, amoxicillin Current Immunizations  Reviewed on 10/31/2014 Name Date Influenza Vaccine 9/24/2014 Tdap 8/26/2014 Not reviewed this visit You Were Diagnosed With   
  
 Codes Comments Hypertension during pregnancy, antepartum, unspecified hypertension in pregnancy type    -  Primary ICD-10-CM: O16.9 ICD-9-CM: 642.93 Prenatal care of multigravida, antepartum     ICD-10-CM: Z34.80 ICD-9-CM: V22.1 Vitals BP Height(growth percentile) Weight(growth percentile) LMP BMI OB Status 126/84 (BP 1 Location: Left arm) 5' 7\" (1.702 m) 198 lb (89.8 kg) 02/19/2018 (Exact Date) 31.01 kg/m2 Having regular periods Smoking Status Never Smoker BMI and BSA Data Body Mass Index Body Surface Area 31.01 kg/m 2 2.06 m 2 Preferred Pharmacy Pharmacy Name Phone [id number] [address], [address] [place] [address] Dr [phone] Your Updated Medication List  
  
   
This list is accurate as of 4/26/18  9:41 AM.  Always use your most recent med list.  
  
  
  
  
 CALCIUM PO Take  by mouth. PRENATAL VITAMIN PO Take  by mouth. ZyrTEC 10 mg Cap Generic drug:  Cetirizine Take  by mouth. We Performed the Following ANTIBODY SCREEN [id number] CPT(R)] BLOOD TYPE, (ABO+RH) [75168 CPT(R)] CBC W/O DIFF [59537 CPT(R)] CHLAMYDIA/GC PCR [54967 CPT(R)] CULTURE, URINE [id number] CPT(R)] HEP B SURFACE AG [id number] CPT(R)] HIV 1/2 AG/AB, 4TH GENERATION,W RFLX CONFIRM [id number] CPT(R)] RUBELLA AB, IGG [id number] CPT(R)] T PALLIDUM SCREEN W/REFLEX [[id number] Custom] Follow-up Instructions Return in about 4 weeks (around 5/24/2018) for Follow up OB visit. Patient Instructions Weeks 6 to 10 of Your Pregnancy: Care Instructions Your Care Instructions Congratulations on your pregnancy. This is an exciting and important time for you. During the first 6 to 10 weeks of your pregnancy, your body goes through many changes. Your baby grows very fast, even though you cannot feel it yet. You may start to notice that you feel different, both in your body and your emotions. Because each woman's pregnancy is unique, there is no right way to feel. You may feel the healthiest you have ever been, or you may feel tired or sick to your stomach (\"morning sickness\"). These early weeks are a time to make healthy choices and to eat the best foods for you and your baby. This care sheet will give you some ideas. This is also a good time to think about birth defects testing. These are tests done during pregnancy to look for possible problems with the baby.  First trimester tests for birth defects can be done between 8 and 17 weeks of pregnancy, depending on the test. Talk with your doctor about what kinds of tests are available. Follow-up care is a key part of your treatment and safety. Be sure to make and go to all appointments, and call your doctor if you are having problems. It's also a good idea to know your test results and keep a list of the medicines you take. How can you care for yourself at home? Eat well · Eat at least 3 meals and 2 healthy snacks every day. Eat fresh, whole foods, including: ¨ 7 or more servings of bread, tortillas, cereal, rice, pasta, or oatmeal. 
¨ 3 or more servings of vegetables, especially leafy green vegetables. ¨ 2 or more servings of fruits. ¨ 3 or more servings of milk, yogurt, or cheese. ¨ 2 or more servings of meat, turkey, chicken, fish, eggs, or dried beans. · Drink plenty of fluids, especially water. Avoid sodas and other sweetened drinks. · Choose foods that have important vitamins for your baby, such as calcium, iron, and folate. ¨ Dairy products, tofu, canned fish with bones, almonds, broccoli, dark leafy greens, corn tortillas, and fortified orange juice are good sources of calcium. ¨ Beef, poultry, liver, spinach, lentils, dried beans, fortified cereals, and dried fruits are rich in iron. ¨ Dark leafy greens, broccoli, asparagus, liver, fortified cereals, orange juice, peanuts, and almonds are good sources of folate. · Avoid foods that could harm your baby. ¨ Do not eat raw or undercooked meat, chicken, or fish (such as sushi or raw oysters). ¨ Do not eat raw eggs or foods that contain raw eggs, such as Caesar dressing. ¨ Do not eat soft cheeses and unpasteurized dairy foods, such as Brie, feta, or blue cheese. ¨ Do not eat fish that contains a lot of mercury, such as shark, swordfish, tilefish, or anthony mackerel. Do not eat more than 6 ounces of tuna each week. ¨ Do not eat raw sprouts, especially alfalfa sprouts. ¨ Cut down on caffeine, such as coffee, tea, and cola. Protect yourself and your baby · Do not touch steve litter or cat feces. They can cause an infection that could harm your baby. · High body temperature can be harmful to your baby. So if you want to use a sauna or hot tub, be sure to talk to your doctor about how to use it safely. [place] with morning sickness · Sip small amounts of water, juices, or shakes. Try drinking between meals, not with meals. · Eat 5 or 6 small meals a day. Try dry toast or crackers when you first get up, and eat breakfast a little later. · Avoid spicy, greasy, and fatty foods. · When you feel sick, open your windows or go for a short walk to get fresh air. · Try nausea wristbands. These help some women. · Tell your doctor if you think your prenatal vitamins make you sick. Where can you learn more? Go to http://shikha-aleksandar.info/. Enter G112 in the search box to learn more about \"Weeks 6 to 10 of Your Pregnancy: Care Instructions. \" Current as of: March 16, 2017 Content Version: 11.4 © 0260-1052 [employer]. Care instructions adapted under license by Typerings.[employer] (which disclaims liability or warranty for this information). If you have questions about a medical condition or this instruction, always ask your healthcare professional. [address] any warranty or liability for your use of this information. Introducing [facility] & HEALTH SERVICES! Dear [facility] HAMLET: 
Thank you for requesting a SnapLayout account. Our records indicate that you already have an active SnapLayout account. You can access your account anytime at [email or web address] Did you know that you can access your hospital and ER discharge instructions at any time in SnapLayout? You can also review all of your test results from your hospital stay or ER visit. Additional Information If you have questions, please visit the Frequently Asked Questions section of the SnapLayout website at [email or web address] Remember, [employer] is NOT to be used for urgent needs. For medical emergencies, dial 911. Now available from your iPhone and Android! Please provide this summary of care documentation to your next provider. Your primary care clinician is listed as Veronica Kat. If you have any questions after today's visit, please call [phone].

## 2018-04-27 LAB
BACTERIA UR CULT: NO GROWTH
URINALYSIS, EXTERNAL: NORMAL

## 2018-04-28 LAB
ABO GROUP BLD: NORMAL
BLD GP AB SCN SERPL QL: NEGATIVE
C TRACH RRNA SPEC QL NAA+PROBE: NEGATIVE
ERYTHROCYTE [DISTWIDTH] IN BLOOD BY AUTOMATED COUNT: 13.1 % (ref 12.3–15.4)
HBV SURFACE AG SERPL QL IA: NEGATIVE
HCT VFR BLD AUTO: 40 % (ref 34–46.6)
HGB BLD-MCNC: 13.6 G/DL (ref 11.1–15.9)
HIV 1+2 AB+HIV1 P24 AG SERPL QL IA: NON REACTIVE
MCH RBC QN AUTO: 30.3 PG (ref 26.6–33)
MCHC RBC AUTO-ENTMCNC: 34 G/DL (ref 31.5–35.7)
MCV RBC AUTO: 89 FL (ref 79–97)
N GONORRHOEA RRNA SPEC QL NAA+PROBE: NEGATIVE
PLATELET # BLD AUTO: 315 X10E3/UL (ref 150–379)
RBC # BLD AUTO: 4.49 X10E6/UL (ref 3.77–5.28)
RH BLD: POSITIVE
RUBV IGG SERPL IA-ACNC: 6.91 INDEX
T PALLIDUM AB SER QL IA: NEGATIVE
WBC # BLD AUTO: 10.2 X10E3/UL (ref 3.4–10.8)

## 2018-05-09 ENCOUNTER — TELEPHONE (OUTPATIENT)
Dept: OBGYN CLINIC | Age: 33
End: 2018-05-09

## 2018-05-09 DIAGNOSIS — O16.9 HYPERTENSION AFFECTING PREGNANCY, ANTEPARTUM: Primary | ICD-10-CM

## 2018-05-10 LAB
CREAT 24H UR-MRATE: 1019 MG/24 HR (ref 800–1800)
CREAT UR-MCNC: 46.3 MG/DL
PROT 24H UR-MRATE: <88 MG/24 HR (ref 30–150)
PROT UR-MCNC: <4 MG/DL

## 2018-05-14 NOTE — TELEPHONE ENCOUNTER
Normal results, add to prenatal records. We can review in detail with patient at next visit.   Add to PL

## 2018-05-17 ENCOUNTER — ROUTINE PRENATAL (OUTPATIENT)
Dept: OBGYN CLINIC | Age: 33
End: 2018-05-17

## 2018-05-17 VITALS
BODY MASS INDEX: 30.76 KG/M2 | SYSTOLIC BLOOD PRESSURE: 130 MMHG | DIASTOLIC BLOOD PRESSURE: 90 MMHG | HEIGHT: 67 IN | WEIGHT: 196 LBS

## 2018-05-17 DIAGNOSIS — O10.019 PRE-EXISTING ESSENTIAL HYPERTENSION DURING PREGNANCY, ANTEPARTUM: Primary | ICD-10-CM

## 2018-05-17 DIAGNOSIS — Z3A.12 12 WEEKS GESTATION OF PREGNANCY: ICD-10-CM

## 2018-05-17 DIAGNOSIS — Z34.80 PRENATAL CARE OF MULTIGRAVIDA, ANTEPARTUM: ICD-10-CM

## 2018-05-17 NOTE — MR AVS SNAPSHOT
[address] [place] [address] 
[phone] Patient: Tala Neumann MRN: [id number] ESW:17[birth year] Visit Information Date & Time Provider Department Dept. Phone Encounter #  
 5/17/2018  1:50 PM MD Matthew [place] [phone] Follow-up Instructions Return in about 4 weeks (around 6/14/2018) for Follow up OB visit. 6/13/2018  9:20 AM  
OB VISIT with MD Matthew [place] ([facility]) Appt Note: 4wk fob  
 [address] [facility] [address] [facility] [address]  
[address] Upcoming Health Maintenance Date Due Influenza Age 5 to Adult 8/1/2018 PAP AKA CERVICAL CYTOLOGY 4/4/2021 Allergies as of 5/17/2018  Review Complete On: 5/17/2018 By: Katt Thomas MD  
  
 Severity Noted Reaction Type Reactions Cefzil [Cefprozil]  01/29/2014    Hives Tolerated z-shari, amoxicillin Current Immunizations  Reviewed on 10/31/2014 Name Date Influenza Vaccine 9/24/2014 Tdap 8/26/2014 Not reviewed this visit You Were Diagnosed With   
  
 Codes Comments Pre-existing essential hypertension during pregnancy, antepartum    -  Primary ICD-10-CM: O10.019 ICD-9-CM: 642.03   
 12 weeks gestation of pregnancy     ICD-10-CM: Z3A.12 
ICD-9-CM: V22.2 Vitals BP Height(growth percentile) Weight(growth percentile) LMP BMI OB Status (!) 160/92 5' 7\" (1.702 m) 196 lb (88.9 kg) 02/19/2018 (Exact Date) 30.7 kg/m2 Pregnant Smoking Status Never Smoker BMI and BSA Data Body Mass Index Body Surface Area 30.7 kg/m 2 2.05 m 2 Preferred Pharmacy Pharmacy Name Phone [id number] [address], [address] [place] [address]  [phone] Your Updated Medication List  
  
   
 This list is accurate as of 5/17/18  2:08 PM.  Always use your most recent med list.  
  
  
  
  
 CALCIUM PO Take  by mouth. PRENATAL VITAMIN PO Take  by mouth. ZyrTEC 10 mg Cap Generic drug:  Cetirizine Take  by mouth. Follow-up Instructions Return in about 4 weeks (around 6/14/2018) for Follow up OB visit. Patient Instructions Weeks 10 to 14 of Your Pregnancy: Care Instructions Your Care Instructions By weeks 10 to 15 of your pregnancy, the placenta has formed inside your uterus. It is possible to hear your baby's heartbeat with a special ultrasound device. Your baby's eyes can and do move. The arms and legs can bend. This is a good time to think about testing for birth defects. There are two types of tests: screening and diagnostic. Screening tests show the chance that a baby has a certain birth defect. They can't tell you for sure that your baby has a problem. Diagnostic tests show if a baby has a certain birth defect. It's your choice whether to have these tests. You and your partner can talk to your doctor or midwife about birth defects tests. Follow-up care is a key part of your treatment and safety. Be sure to make and go to all appointments, and call your doctor if you are having problems. It's also a good idea to know your test results and keep a list of the medicines you take. How can you care for yourself at home? Decide about tests · You can have screening tests and diagnostic tests to check for birth defects. The decision to have a test for birth defects is personal. Think about your age, your chance of passing on a family disease, your need to know about any problems, and what you might do after you have the test results. ¨ Triple or quadruple (quad) blood tests. These screening tests can be done between 15 and 20 weeks of pregnancy.  They check the amounts of three or four substances in your blood. The doctor looks at these test results, along with your age and other factors, to find out the chance that your baby may have certain problems. ¨ Amniocentesis. This diagnostic test is used to look for chromosomal problems in the baby's cells. It can be done between 15 and 20 weeks of pregnancy, usually around week 16. 
¨ Nuchal translucency test. This test uses ultrasound to measure the thickness of the area at the back of the baby's neck. An increase in the thickness can be an early sign of Down syndrome. ¨ Chorionic villus sampling ([facility]). This is a test that looks for certain genetic problems with your baby. The same genes that are in your baby are in the placenta. A small piece of the placenta is taken out and tested. This test is done when you are 10 to 13 weeks pregnant. Ease discomfort · Slow down and take naps when you feel tired. · If your emotions swing, talk to someone. Crying, anxiety, and concentration problems are common. · If your gums bleed, try a softer toothbrush. If your gums are puffy and bleed a lot, see your dentist. 
· If you feel dizzy: ¨ Get up slowly after sitting or lying down. ¨ Drink plenty of fluids. ¨ Eat small snacks to keep your blood sugar stable. ¨ Put your head between your legs as though you were tying your shoelaces. ¨ Lie down with your legs higher than your head. Use pillows to prop up your feet. · If you have a headache: 
¨ Lie down. ¨ Ask your partner or a good friend for a neck massage. ¨ Try cool cloths over your forehead or across the back of your neck. ¨ Use acetaminophen (Tylenol) for pain relief. Do not use nonsteroidal anti-inflammatory drugs (NSAIDs), such as ibuprofen (Advil, Motrin) or naproxen (Aleve), unless your doctor says it is okay. · If you have a nosebleed, pinch your nose gently, and hold it for a short while.  To prevent nosebleeds, try massaging a small dab of petroleum jelly, such as Vaseline, in your nostrils. · If your nose is stuffed up, try saline (saltwater) nose sprays. Do not use decongestant sprays. Care for your breasts · Wear a bra that gives you good support. · Know that changes in your breasts are normal. 
¨ Your breasts may get larger and more tender. Tenderness usually gets better by 12 weeks. ¨ Your nipples may get darker and larger, and small bumps around your nipples may show more. ¨ The veins in your chest and breasts may show more. · Don't worry about \"toughening'\" your nipples. Breastfeeding will naturally do this. Where can you learn more? Go to http://shikha-aleksandar.info/. Enter C939 in the search box to learn more about \"Weeks 10 to 14 of Your Pregnancy: Care Instructions. \" Current as of: March 16, 2017 Content Version: 11.4 © 1701-5642 ContractRoom. Care instructions adapted under license by Glow (which disclaims liability or warranty for this information). If you have questions about a medical condition or this instruction, always ask your healthcare professional. [place] [address] any warranty or liability for your use of this information. Introducing [facility] & HEALTH SERVICES! Dear Devin Wilson: 
Thank you for requesting a Finderly account. Our records indicate that you already have an active Finderly account. You can access your account anytime at [email or web address] Did you know that you can access your hospital and ER discharge instructions at any time in Finderly? You can also review all of your test results from your hospital stay or ER visit. Additional Information If you have questions, please visit the Frequently Asked Questions section of the Finderly website at [email or web address] Remember, Finderly is NOT to be used for urgent needs. For medical emergencies, dial 911. Now available from your iPhone and Android! Please provide this summary of care documentation to your next provider. Your primary care clinician is listed as Vaughn Sanchez. If you have any questions after today's visit, please call [phone].

## 2018-05-17 NOTE — PATIENT INSTRUCTIONS

## 2018-05-17 NOTE — PROGRESS NOTES
[facility]  [email or web address]  [phone]    Damon MD Fam, FACOG     Follow-up OB visit    Chief Complaint   Patient presents with    Routine Prenatal Visit       Vitals:    18 1404 18 1412   BP: (!) 160/92 130/90   Weight: 196 lb (88.9 kg)    Height: 5' 7\" (1.702 m)        Patient Active Problem List    Diagnosis Date Noted    Prenatal care of multigravida, antepartum 2018     Priority: 1 - One    Pre-existing essential hypertension during pregnancy, antepartum 2018    Hypertension during pregnancy, antepartum 2018    Hyperprolactinemia ([address] 2016    HTN (hypertension), benign 2015       The patient reports the following concerns: none    See PN flowsheet for exam    28 y.o.  12w3d   Encounter Diagnoses   Name Primary?  Pre-existing essential hypertension during pregnancy, antepartum Yes    12 weeks gestation of pregnancy     Prenatal care of multigravida, antepartum      Travel prec (mauri) has support hose  Disc concerns re BP: recheck back to baseline (was worried about baby having heart beat)  Close observation  Stroke precautions reviewed  Hold on BP meds for now   [] SAB/bleeding precautions reviewed   [] PTL/PPROM precautions reviewed   [] Labor precautions reviewed   [] Fetal kick counts discussed   [] Labs reviewed with patient   [] Chyna Maria Eugenia precautions reviewed   [] Consent reviewed   [] Handouts given to pt   [] Glucola handout    [] GBS/labor/Magic Hour handout   []    []    []    []    Follow-up Disposition:  Return in about 4 weeks (around 2018) for Follow up OB visit.     Orders Placed This Encounter   Haile Loja MD

## 2018-06-13 ENCOUNTER — ROUTINE PRENATAL (OUTPATIENT)
Dept: OBGYN CLINIC | Age: 33
End: 2018-06-13

## 2018-06-13 VITALS — WEIGHT: 193 LBS | SYSTOLIC BLOOD PRESSURE: 128 MMHG | BODY MASS INDEX: 30.23 KG/M2 | DIASTOLIC BLOOD PRESSURE: 84 MMHG

## 2018-06-13 DIAGNOSIS — O10.019 PRE-EXISTING ESSENTIAL HYPERTENSION DURING PREGNANCY, ANTEPARTUM: Primary | ICD-10-CM

## 2018-06-13 DIAGNOSIS — Z3A.16 16 WEEKS GESTATION OF PREGNANCY: ICD-10-CM

## 2018-06-13 DIAGNOSIS — Z34.80 PRENATAL CARE OF MULTIGRAVIDA, ANTEPARTUM: ICD-10-CM

## 2018-06-13 NOTE — MR AVS SNAPSHOT
[address] [facility] [address] [facility] 
[phone] Patient: Terrial Mention MRN: [id number] HWD:[birth year] Visit Information Date & Time Provider Department Dept. Phone Encounter #  
 6/13/2018  9:20 AM MD Matthew Branch [phone] [id number]  
  
 7/10/2018 10:20 AM  
OB VISIT with MD Matthew Branch ([address]) Appt Note: fob/pnc @ [address] [place] [address] [address] Replaced by [facility] [phone]  
[facility] [address] 1007 [facility] Upcoming Health Maintenance Date Due Influenza Age 5 to Adult 8/1/2018 PAP AKA CERVICAL CYTOLOGY 4/4/2021 Allergies as of 6/13/2018  Review Complete On: 6/13/2018 By: Gómez Alexander Severity Noted Reaction Type Reactions Cefzil [Cefprozil]  01/29/2014    Hives Tolerated z-shari, amoxicillin Current Immunizations  Reviewed on 10/31/2014 Name Date Influenza Vaccine 9/24/2014 Tdap 8/26/2014 Not reviewed this visit Vitals BP Weight(growth percentile) LMP BMI OB Status Smoking Status 128/84 193 lb (87.5 kg) 02/19/2018 (Exact Date) 30.23 kg/m2 Pregnant Never Smoker Vitals History BMI and BSA Data Body Mass Index Body Surface Area  
 30.23 kg/m 2 2.03 m 2 Preferred Pharmacy Pharmacy Name Phone [id number] [facility], [address] [place] [address]  [phone] Your Updated Medication List  
  
   
This list is accurate as of 6/13/18  9:21 AM.  Always use your most recent med list.  
  
  
  
  
 CALCIUM PO Take  by mouth. PRENATAL VITAMIN PO Take  by mouth. ZyrTEC 10 mg Cap Generic drug:  Cetirizine Take  by mouth. To-Do List   
 07/10/2018 8:45 AM  
  Appointment with ULTRASOUND 1 SFM at [facility] ([phone]) Patient Instructions Weeks 14 to 18 of Your Pregnancy: Care Instructions Your Care Instructions During this time, you may start to \"show,\" so that you look pregnant to people around you. You may also notice some changes in your skin, such as itchy spots on your palms or acne on your face. Your baby is now able to pass urine, and your baby's first stool (meconium) is starting to collect in his or her intestines. Hair is also beginning to grow on your baby's head. At your next visit, between weeks 18 and 20, your doctor may do an ultrasound test. The test allows your doctor to check for certain problems. Your doctor can also tell the sex of your baby. This is a good time to think about whether you want to know whether your baby is a boy or a girl. Talk to your doctor about getting a flu shot to help keep you healthy during your pregnancy. As your pregnancy moves along, it is common to worry or feel anxious. Your body is changing a lot. And you are thinking about giving birth, the health of your baby, and becoming a parent. You can learn to cope with any anxiety and stress you feel. Follow-up care is a key part of your treatment and safety. Be sure to make and go to all appointments, and call your doctor if you are having problems. It's also a good idea to know your test results and keep a list of the medicines you take. How can you care for yourself at home? ?Reduce stress ? · Ask for help with cooking and housekeeping. ? · Figure out who or what causes your stress. Avoid these people or situations as much as possible. ? · Relax every day. Taking 10- to 15-minute breaks can make a big difference. Take a walk, listen to music, or take a warm bath. ? · Learn relaxation techniques at prenatal or yoga class. Or buy a relaxation tape. ? · List your fears about having a baby and becoming a parent. Share the list with someone you trust. Decide which worries are really small, and try to let them go. Exercise ? · If you did not exercise much before pregnancy, start slowly. Walking is best. Talha Haff yourself, and do a little more every day. ? · Brisk walking, easy jogging, low-impact aerobics, water aerobics, and yoga are good choices. Some sports, such as scuba diving, horseback riding, downhill skiing, gymnastics, and water skiing, are not a good idea. ? · Try to do at least 2½ hours a week of moderate exercise, such as a fast walk. One way to do this is to be active 30 minutes a day, at least 5 days a week. It's fine to be active in blocks of 10 minutes or more throughout your day and week. ? · Wear loose clothing. And wear shoes and a bra that provide good support. ? · Warm up and cool down to start and finish your exercise. ? · If you want to use weights, be sure to use light weights. They reduce stress on your joints. ?Stay at the best weight for you ? · Experts recommend that you gain about 1 pound a month during the first 3 months of your pregnancy. ? · Experts recommend that you gain about 1 pound a week during your last 6 months of pregnancy, for a total weight gain of 25 to 35 pounds. ? · If you are underweight, you will need to gain more weight (about 28 to 40 pounds). ? · If you are overweight, you may not need to gain as much weight (about 15 to 25 pounds). ? · If you are gaining weight too fast, use common sense. Exercise every day, and limit sweets, fast foods, and fats. Choose lean meats, fruits, and vegetables. ? · If you are having twins or more, your doctor may refer you to a dietitian. Where can you learn more? Go to http://shikha-aleksandar.info/. Enter U878 in the search box to learn more about \"Weeks 14 to 18 of Your Pregnancy: Care Instructions. \" Current as of: March 16, 2017 Content Version: 11.4 © 1381-4000 [employer].  Care instructions adapted under license by [employer] (which disclaims liability or warranty for this information). If you have questions about a medical condition or this instruction, always ask your healthcare professional. [address] any warranty or liability for your use of this information. Introducing [facility] & HEALTH SERVICES! Dear Garret Galvez: 
Thank you for requesting a EasyQasa account. Our records indicate that you already have an active EasyQasa account. You can access your account anytime at [email or web address]. [email or web address] Did you know that you can access your hospital and ER discharge instructions at any time in EasyQasa? You can also review all of your test results from your hospital stay or ER visit. Additional Information If you have questions, please visit the Frequently Asked Questions section of the EasyQasa website at [email or web address] Remember, EasyQasa is NOT to be used for urgent needs. For medical emergencies, dial 911. Now available from your iPhone and Android! Please provide this summary of care documentation to your next provider. Your primary care clinician is listed as Virginia Ruelas. If you have any questions after today's visit, please call [phone].

## 2018-06-13 NOTE — PATIENT INSTRUCTIONS
Weeks 14 to 18 of Your Pregnancy: Care Instructions  Your Care Instructions    During this time, you may start to \"show,\" so that you look pregnant to people around you. You may also notice some changes in your skin, such as itchy spots on your palms or acne on your face. Your baby is now able to pass urine, and your baby's first stool (meconium) is starting to collect in his or her intestines. Hair is also beginning to grow on your baby's head. At your next visit, between weeks 18 and 20, your doctor may do an ultrasound test. The test allows your doctor to check for certain problems. Your doctor can also tell the sex of your baby. This is a good time to think about whether you want to know whether your baby is a boy or a girl. Talk to your doctor about getting a flu shot to help keep you healthy during your pregnancy. As your pregnancy moves along, it is common to worry or feel anxious. Your body is changing a lot. And you are thinking about giving birth, the health of your baby, and becoming a parent. You can learn to cope with any anxiety and stress you feel. Follow-up care is a key part of your treatment and safety. Be sure to make and go to all appointments, and call your doctor if you are having problems. It's also a good idea to know your test results and keep a list of the medicines you take. How can you care for yourself at home? ?Reduce stress  ? · Ask for help with cooking and housekeeping. ? · Figure out who or what causes your stress. Avoid these people or situations as much as possible. ? · Relax every day. Taking 10- to 15-minute breaks can make a big difference. Take a walk, listen to music, or take a warm bath. ? · Learn relaxation techniques at prenatal or yoga class. Or buy a relaxation tape. ? · List your fears about having a baby and becoming a parent. Share the list with someone you trust. Decide which worries are really small, and try to let them go. Exercise  ?  · If you did not exercise much before pregnancy, start slowly. Walking is best. Susy Anis yourself, and do a little more every day. ? · Brisk walking, easy jogging, low-impact aerobics, water aerobics, and yoga are good choices. Some sports, such as scuba diving, horseback riding, downhill skiing, gymnastics, and water skiing, are not a good idea. ? · Try to do at least 2½ hours a week of moderate exercise, such as a fast walk. One way to do this is to be active 30 minutes a day, at least 5 days a week. It's fine to be active in blocks of 10 minutes or more throughout your day and week. ? · Wear loose clothing. And wear shoes and a bra that provide good support. ? · Warm up and cool down to start and finish your exercise. ? · If you want to use weights, be sure to use light weights. They reduce stress on your joints. ?Stay at the best weight for you  ? · Experts recommend that you gain about 1 pound a month during the first 3 months of your pregnancy. ? · Experts recommend that you gain about 1 pound a week during your last 6 months of pregnancy, for a total weight gain of 25 to 35 pounds. ? · If you are underweight, you will need to gain more weight (about 28 to 40 pounds). ? · If you are overweight, you may not need to gain as much weight (about 15 to 25 pounds). ? · If you are gaining weight too fast, use common sense. Exercise every day, and limit sweets, fast foods, and fats. Choose lean meats, fruits, and vegetables. ? · If you are having twins or more, your doctor may refer you to a dietitian. Where can you learn more? Go to http://shikha-aleksandar.info/. Enter R910 in the search box to learn more about \"Weeks 14 to 18 of Your Pregnancy: Care Instructions. \"  Current as of: March 16, 2017  Content Version: 11.4  © 6764-9635 [employer]. Care instructions adapted under license by [employer] (which disclaims liability or warranty for this information).  If you have questions about a medical condition or this instruction, always ask your healthcare professional. [place] [address] any warranty or liability for your use of this information.

## 2018-06-13 NOTE — PROGRESS NOTES
_ 164 [facility]  [email or web address]  [phone]    Damon MD Fam, FACOG     Follow-up OB visit    Chief Complaint   Patient presents with    Routine Prenatal Visit       Vitals:    18 0913   BP: 128/84   Weight: 193 lb (87.5 kg)       Patient Active Problem List    Diagnosis Date Noted    Prenatal care of multigravida, antepartum 2018     Priority: 1 - One    Pre-existing essential hypertension during pregnancy, antepartum 2018    Hypertension during pregnancy, antepartum 2018    Hyperprolactinemia ([facility] [address] 2016    HTN (hypertension), benign 2015       The patient reports the following concerns: none    See PN flowsheet for exam    28 y.o.  16w2d   Encounter Diagnoses   Name Primary?     Pre-existing essential hypertension during pregnancy, antepartum Yes    16 weeks gestation of pregnancy     Prenatal care of multigravida, antepartum      Start low dose ASA   [] SAB/bleeding precautions reviewed   [] PTL/PPROM precautions reviewed   [] Labor precautions reviewed   [] Fetal kick counts discussed   [] Labs reviewed with patient   [] Chyna Maria Eugenia precautions reviewed   [] Consent reviewed   [] Handouts given to pt   [] Glucola handout    [] GBS/labor/Magic Hour handout   []    []    []    []    Follow-up Disposition: Not on File    Orders Placed This Encounter    SILVIANO Parrish MD

## 2018-06-16 LAB
2ND TRIMESTER 4 SCREEN SERPL-IMP: NORMAL
2ND TRIMESTER 4 SCREEN SERPL-IMP: NORMAL
AFP ADJ MOM SERPL: 0.93
AFP SERPL-MCNC: 27.3 NG/ML
AFP, MATERNAL, EXTERNAL: NEGATIVE
AGE AT DELIVERY: 33 YR
COMMENTS, 018014: NORMAL
FET TS 18 RISK FROM MAT AGE: NORMAL
FET TS 21 RISK FROM MAT AGE: 444
GA METHOD: NORMAL
GA: 16.3 WEEKS
HCG ADJ MOM SERPL: 2.02
HCG SERPL-ACNC: NORMAL MIU/ML
IDDM PATIENT QL: NO
INHIBIN A ADJ MOM SERPL: 1.63
INHIBIN A SERPL-MCNC: 236.21 PG/ML
MULTIPLE PREGNANCY: NO
NEURAL TUBE DEFECT RISK FETUS: NORMAL %
RESULTS, 017389: NORMAL
TS 18 RISK FETUS: NORMAL
TS 21 RISK FETUS: 496
U ESTRIOL ADJ MOM SERPL: 1.01
U ESTRIOL SERPL-MCNC: 0.78 NG/ML

## 2018-07-10 ENCOUNTER — HOSPITAL ENCOUNTER (OUTPATIENT)
Dept: PERINATAL CARE | Age: 33
Discharge: HOME | End: 2018-07-10
Attending: OBSTETRICS & GYNECOLOGY
Payer: COMMERCIAL

## 2018-07-10 ENCOUNTER — ROUTINE PRENATAL (OUTPATIENT)
Dept: OBGYN CLINIC | Age: 33
End: 2018-07-10

## 2018-07-10 VITALS — DIASTOLIC BLOOD PRESSURE: 78 MMHG | BODY MASS INDEX: 31.17 KG/M2 | WEIGHT: 199 LBS | SYSTOLIC BLOOD PRESSURE: 144 MMHG

## 2018-07-10 DIAGNOSIS — I10 ESSENTIAL HYPERTENSION: Primary | ICD-10-CM

## 2018-07-10 DIAGNOSIS — Z34.80 PRENATAL CARE OF MULTIGRAVIDA, ANTEPARTUM: ICD-10-CM

## 2018-07-10 DIAGNOSIS — Z3A.20 20 WEEKS GESTATION OF PREGNANCY: ICD-10-CM

## 2018-07-10 PROCEDURE — 76811 OB US DETAILED SNGL FETUS: CPT | Performed by: OBSTETRICS & GYNECOLOGY

## 2018-07-10 NOTE — MR AVS SNAPSHOT
[address] [facility] [address] [place] [address] 
[phone] Patient: Kyung Argueta MRN: [id number] RUX:04/[birth year]17 Visit Information Date & Time Provider Department Dept. Phone Encounter #  
 7/10/2018 10:20 AM Sula Sandifer, MD [place] [phone] [id number] 7/10/2018 10:20 AM  
OB VISIT with Sula Sandifer, MD [place] ([facility]) Appt Note: fob/pnc @ [address] [place] [address] [address] [place] [id number]  
[phone]  
  
   
 [address] [place] [address]  
  
    
 8/8/2018  9:20 AM  
OB VISIT with Sula Sandifer, MD [place] ([facility]) Appt Note: 4wk fob   TP  
 [address] [facility] [address] [place] [address]  
[phone]  
  
    
 9/5/2018  9:00 AM  
OB VISIT with Sula Sandifer, MD [place] ([facility]) Appt Note: 4wk fob w/glucola  TP  
 [address] [facility] [address] [place] [address]  
[phone] Upcoming Health Maintenance Date Due Influenza Age 5 to Adult 8/1/2018 PAP AKA CERVICAL CYTOLOGY 4/4/2021 Allergies as of 7/10/2018  Review Complete On: 6/13/2018 By: Sula Sandifer, MD  
  
 Severity Noted Reaction Type Reactions Cefzil [Cefprozil]  01/29/2014    Hives Tolerated z-shari, amoxicillin Current Immunizations  Reviewed on 10/31/2014 Name Date Influenza Vaccine 9/24/2014 Tdap 8/26/2014 Not reviewed this visit Vitals BP Weight(growth percentile) LMP BMI OB Status Smoking Status 144/78 199 lb (90.3 kg) 02/19/2018 (Exact Date) 31.17 kg/m2 Pregnant Never Smoker Vitals History BMI and BSA Data Body Mass Index Body Surface Area  
 31.17 kg/m 2 2.07 m 2 Preferred Pharmacy Pharmacy Name Phone [id number] , [address] [place] [address]  [phone] Your Updated Medication List  
  
   
This list is accurate as of 7/10/18 10:13 AM.  Always use your most recent med list.  
  
  
  
  
 CALCIUM PO Take  by mouth. PRENATAL VITAMIN PO Take  by mouth. ZyrTEC 10 mg Cap Generic drug:  Cetirizine Take  by mouth. Patient Instructions Weeks 18 to 22 of Your Pregnancy: Care Instructions Your Care Instructions Your baby is continuing to develop quickly. At this stage, babies can now suck their thumbs,  firmly with their hands, and open and close their eyelids. Sometime between 18 and 22 weeks, you will start to feel your baby move. At first, these small fetal movements feel like fluttering or \"butterflies. \" Some women say that they feel like gas bubbles. As the baby grows, these movements will become stronger. You may also notice that your baby kicks and hiccups. During this time, you may find that your nausea and fatigue are gone. Overall, you may feel better and have more energy than you did in your first trimester. But you may also have new discomforts now, such as sleep problems or leg cramps. This care sheet can help you ease these discomforts. Follow-up care is a key part of your treatment and safety. Be sure to make and go to all appointments, and call your doctor if you are having problems. It's also a good idea to know your test results and keep a list of the medicines you take. How can you care for yourself at home? Ease sleep problems · Avoid caffeine in drinks or chocolate late in the day. · Get some exercise every day. · Take a warm shower or bath before bed. · Have a light snack or glass of milk at bedtime. · Do relaxation exercises in bed to calm your mind and body. · Support your legs and back with extra pillows. Try a pillow between your legs if you sleep on your side. · Do not use sleeping pills or alcohol. They could harm your baby. Ease leg cramps · Do not massage your calf during the cramp. · Sit on a firm bed or chair. Straighten your leg, and bend your foot (flex your ankle) slowly upward, toward your knee. Bend your toes up and down. · Stand on a cool, flat surface. Stretch your toes upward, and take small steps walking on your heels. · Use a heating pad or hot water bottle to help with muscle ache. Prevent leg cramps · Be sure to get enough calcium. If you are worried that you are not getting enough, talk to your doctor. · Exercise every day, and stretch your legs before bed. · Take a warm bath before bed, and try leg warmers at night. Where can you learn more? Go to http://shikha-aleksandar.info/. Enter C968 in the search box to learn more about \"Weeks 18 to 22 of Your Pregnancy: Care Instructions. \" Current as of: March 16, 2017 Content Version: 11.4 © 9539-4342 [employer]. Care instructions adapted under license by [employer] (which disclaims liability or warranty for this information). If you have questions about a medical condition or this instruction, always ask your healthcare professional. [place] [address] any warranty or liability for your use of this information. Introducing [facility] & HEALTH SERVICES! Dear Bishop Stroud: 
Thank you for requesting a FlowPay account. Our records indicate that you already have an active FlowPay account. You can access your account anytime at [email or web address] Did you know that you can access your hospital and ER discharge instructions at any time in FlowPay? You can also review all of your test results from your hospital stay or ER visit. Additional Information If you have questions, please visit the Frequently Asked Questions section of the FlowPay website at [email or web address] Remember, FlowPay is NOT to be used for urgent needs. For medical emergencies, dial 911. Now available from your iPhone and Android! Please provide this summary of care documentation to your next provider. Your primary care clinician is listed as Denver Rands. If you have any questions after today's visit, please call [phone].

## 2018-07-10 NOTE — PATIENT INSTRUCTIONS

## 2018-07-10 NOTE — PROGRESS NOTES
_ 164 [facility]  [email or web address]  [phone]    Sly Jenkins MD, FACOG     Follow-up OB visit    Chief Complaint   Patient presents with    Routine Prenatal Visit       Vitals:    07/10/18 1003   BP: 144/78   Weight: 199 lb (90.3 kg)       Patient Active Problem List    Diagnosis Date Noted    Prenatal care of multigravida, antepartum 2018     Priority: 1 - One    Pre-existing essential hypertension during pregnancy, antepartum 2018    Hypertension during pregnancy, antepartum 2018    Hyperprolactinemia ([facility] [address] 2016    HTN (hypertension), benign 2015       The patient reports the following concerns: none    See PN flowsheet for exam    28 y.o.  20w1d   Encounter Diagnoses   Name Primary?  Prenatal care of multigravida, antepartum     Essential hypertension Yes    20 weeks gestation of pregnancy      Saw mfm, a little stress about ? CP cyst  Rev TS   [] SAB/bleeding precautions reviewed   [] PTL/PPROM precautions reviewed   [] Labor precautions reviewed   [] Fetal kick counts discussed   [] Labs reviewed with patient   [] Juliette Russell precautions reviewed   [] Consent reviewed   [] Handouts given to pt   [] Glucola handout    [] GBS/labor/Magic Hour handout   []    []    []    []    Follow-up Disposition:  Return in about 4 weeks (around 2018) for Follow up OB visit. No orders of the defined types were placed in this encounter.       Sly Jenkins MD

## 2018-08-08 ENCOUNTER — ROUTINE PRENATAL (OUTPATIENT)
Dept: OBGYN CLINIC | Age: 33
End: 2018-08-08

## 2018-08-08 VITALS
BODY MASS INDEX: 32.18 KG/M2 | WEIGHT: 205 LBS | HEIGHT: 67 IN | SYSTOLIC BLOOD PRESSURE: 110 MMHG | DIASTOLIC BLOOD PRESSURE: 72 MMHG

## 2018-08-08 DIAGNOSIS — O10.019 PRE-EXISTING ESSENTIAL HYPERTENSION DURING PREGNANCY, ANTEPARTUM: ICD-10-CM

## 2018-08-08 DIAGNOSIS — Z34.80 PRENATAL CARE OF MULTIGRAVIDA, ANTEPARTUM: Primary | ICD-10-CM

## 2018-08-08 NOTE — PROGRESS NOTES
[facility]  [email or web address]  [phone]    Tete Read MD, FACOG     Follow-up OB visit    No chief complaint on file. Vitals:    18 0910   BP: 110/72   Weight: 205 lb (93 kg)   Height: 5' 7\" (1.702 m)       Patient Active Problem List    Diagnosis Date Noted    Prenatal care of multigravida, antepartum 2018     Priority: 1 - One    Pre-existing essential hypertension during pregnancy, antepartum 2018    Hypertension during pregnancy, antepartum 2018    Hyperprolactinemia ([address] 2016    HTN (hypertension), benign 2015        The patient reports the following concerns: none    See PN flowsheet for exam    28 y.o.  24w2d   Encounter Diagnoses   Name Primary?  Pre-existing essential hypertension during pregnancy, antepartum     Prenatal care of multigravida, antepartum Yes        [] SAB/bleeding precautions reviewed   [] PTL/PPROM precautions reviewed   [] Labor precautions reviewed   [] Fetal kick counts discussed   [] Labs reviewed with patient   [] Fritzi Blade precautions reviewed   [] Consent reviewed   [] Handouts given to pt   [x] Glucola handout    [] GBS/labor/Magic Hour handout   []    [] We reviewed CDC recommendations for Tdap for patient and close contacts and RBA of receiving in pregnancy, advised obtaining in third trimester   [] Reviewed healthy nutrition in pregnancy and good exercise practices   [] We disc safer medications in pregnancy and referred patient to [facility] resources   [] We reviewed CDC recommendations for flu vaccine and RBA of receiving in pregnancy   []    []    []         Follow-up Disposition:  Return in about 4 weeks (around 2018) for Follow up OB visit. No orders of the defined types were placed in this encounter.       Tete Read MD

## 2018-08-08 NOTE — PATIENT INSTRUCTIONS
Weeks 22 to 26 of Your Pregnancy: Care Instructions  Your Care Instructions    As you enter your 7th month of pregnancy at week 26, your baby's lungs are growing stronger and getting ready to breathe. You may notice that your baby responds to the sound of your or your partner's voice. You may also notice that your baby does less turning and twisting and more squirming or jerking. Jerking often means that your baby has the hiccups. Hiccups are perfectly normal and are only temporary. You may want to think about attending a childbirth preparation class. This is also a good time to start thinking about whether you want to have pain medicine during labor. Most pregnant women are tested for gestational diabetes between weeks 25 and 28. Gestational diabetes occurs when your blood sugar level gets too high when you're pregnant. The test is important, because you can have gestational diabetes and not know it. But the condition can cause problems for your baby. Follow-up care is a key part of your treatment and safety. Be sure to make and go to all appointments, and call your doctor if you are having problems. It's also a good idea to know your test results and keep a list of the medicines you take. How can you care for yourself at home? Ease discomfort from your baby's kicking  · Change your position. Sometimes this will cause your baby to change position too. · Take a deep breath while you raise your arm over your head. Then breathe out while you drop your arm. Do Kegel exercises to prevent urine from leaking  · You can do Kegel exercises while you stand or sit. ¨ Squeeze the same muscles you would use to stop your urine. Your belly and thighs should not move. ¨ Hold the squeeze for 3 seconds, and then relax for 3 seconds. ¨ Start with 3 seconds. Then add 1 second each week until you are able to squeeze for 10 seconds. ¨ Repeat the exercise 10 to 15 times for each session.  Do three or more sessions each day.  Ease or reduce swelling in your feet, ankles, hands, and fingers  · If your fingers are puffy, take off your rings. · Do not eat high-salt foods, such as potato chips. · Prop up your feet on a stool or couch as much as possible. Sleep with pillows under your feet. · Do not stand for long periods of time or wear tight shoes. · Wear support stockings. Where can you learn more? Go to http://shikha-aleksandar.info/. Enter G264 in the search box to learn more about \"Weeks 22 to 26 of Your Pregnancy: Care Instructions. \"  Current as of: November 21, 2017  Content Version: 11.7  © 4278-7345 [employer], [facility]. Care instructions adapted under license by [employer] (which disclaims liability or warranty for this information). If you have questions about a medical condition or this instruction, always ask your healthcare professional. [place] [address] any warranty or liability for your use of this information.

## 2018-08-08 NOTE — MR AVS SNAPSHOT
[address] [facility] [address] [facility] 
[phone] Patient: Nguyen Stewart MRN: [id number] VSB:[id number] Visit Information Date & Time Provider Department Dept. Phone Encounter #  
 8/8/2018  9:20 AM MD Matthew Ulloa [phone] [id number]  
  
 8/8/2018  9:20 AM  
OB VISIT with MD Matthew Ulloa (Wenceslao Washburn) Appt Note: 4wk fob   TP  
 [address] [facility] [address] [facility] [address]  
  
    
 9/5/2018  9:50 AM  
OB VISIT with MD Matthew Ulloa (Wenceslao Washburn) Appt Note: 4wk fob w/glucola TP  
 [address] [place] [address] [address] [facility]  
[phone] Upcoming Health Maintenance Date Due Influenza Age 5 to Adult 8/1/2018 PAP AKA CERVICAL CYTOLOGY 4/4/2021 DTaP/Tdap/Td series (2 - Td) 8/26/2024 Allergies as of 8/8/2018  Review Complete On: 8/8/2018 By: Yuan Cope LPN Severity Noted Reaction Type Reactions Cefzil [Cefprozil]  01/29/2014    Hives Tolerated z-shari, amoxicillin Current Immunizations  Reviewed on 10/31/2014 Name Date Influenza Vaccine 9/24/2014 Tdap 8/26/2014 Not reviewed this visit Vitals BP Height(growth percentile) Weight(growth percentile) LMP BMI OB Status 110/72 5' 7\" (1.702 m) 205 lb (93 kg) 02/19/2018 (Exact Date) 32.11 kg/m2 Pregnant Smoking Status Never Smoker BMI and BSA Data Body Mass Index Body Surface Area  
 32.11 kg/m 2 2.1 m 2 Preferred Pharmacy Pharmacy Name Phone [id number] [phone] , [address] [place] [address]  [phone] Your Updated Medication List  
  
   
This list is accurate as of 8/8/18  9:17 AM.  Always use your most recent med list.  
  
  
  
  
 CALCIUM PO Take  by mouth. PRENATAL VITAMIN PO Take  by mouth. ZyrTEC 10 mg Cap Generic drug:  Cetirizine Take  by mouth. Patient Instructions Weeks 22 to 26 of Your Pregnancy: Care Instructions Your Care Instructions As you enter your 7th month of pregnancy at week 26, your baby's lungs are growing stronger and getting ready to breathe. You may notice that your baby responds to the sound of your or your partner's voice. You may also notice that your baby does less turning and twisting and more squirming or jerking. Jerking often means that your baby has the hiccups. Hiccups are perfectly normal and are only temporary. You may want to think about attending a childbirth preparation class. This is also a good time to start thinking about whether you want to have pain medicine during labor. Most pregnant women are tested for gestational diabetes between weeks 25 and 28. Gestational diabetes occurs when your blood sugar level gets too high when you're pregnant. The test is important, because you can have gestational diabetes and not know it. But the condition can cause problems for your baby. Follow-up care is a key part of your treatment and safety. Be sure to make and go to all appointments, and call your doctor if you are having problems. It's also a good idea to know your test results and keep a list of the medicines you take. How can you care for yourself at home? Ease discomfort from your baby's kicking · Change your position. Sometimes this will cause your baby to change position too. · Take a deep breath while you raise your arm over your head. Then breathe out while you drop your arm. Do Kegel exercises to prevent urine from leaking · You can do Kegel exercises while you stand or sit. ¨ Squeeze the same muscles you would use to stop your urine. Your belly and thighs should not move. ¨ Hold the squeeze for 3 seconds, and then relax for 3 seconds. ¨ Start with 3 seconds. Then add 1 second each week until you are able to squeeze for 10 seconds. ¨ Repeat the exercise 10 to 15 times for each session. Do three or more sessions each day. Ease or reduce swelling in your feet, ankles, hands, and fingers · If your fingers are puffy, take off your rings. · Do not eat high-salt foods, such as potato chips. · Prop up your feet on a stool or couch as much as possible. Sleep with pillows under your feet. · Do not stand for long periods of time or wear tight shoes. · Wear support stockings. Where can you learn more? Go to http://shikha-aleksandar.info/. Enter G264 in the search box to learn more about \"Weeks 22 to 26 of Your Pregnancy: Care Instructions. \" Current as of: November 21, 2017 Content Version: 11.7 © 1083-1332 [employer] (formerly [employer]). Care instructions adapted under license by [employer] (which disclaims liability or warranty for this information). If you have questions about a medical condition or this instruction, always ask your healthcare professional. [place] [address] any warranty or liability for your use of this information. Introducing [address]! Dear Shaneka Wilson: 
Thank you for requesting a [employer] account. Our records indicate that you already have an active [employer] account. You can access your account anytime at [email or web address] Did you know that you can access your hospital and ER discharge instructions at any time in [employer]? You can also review all of your test results from your hospital stay or ER visit. Additional Information If you have questions, please visit the Frequently Asked Questions section of the [employer] website at [email or web address] Remember, [employer] is NOT to be used for urgent needs. For medical emergencies, dial 911. Now available from your iPhone and Android! Please provide this summary of care documentation to your next provider. Your primary care clinician is listed as Carline Lyon. If you have any questions after today's visit, please call [phone].

## 2018-09-05 ENCOUNTER — ROUTINE PRENATAL (OUTPATIENT)
Dept: OBGYN CLINIC | Age: 33
End: 2018-09-05

## 2018-09-05 VITALS — SYSTOLIC BLOOD PRESSURE: 118 MMHG | WEIGHT: 211 LBS | DIASTOLIC BLOOD PRESSURE: 74 MMHG | BODY MASS INDEX: 33.05 KG/M2

## 2018-09-05 DIAGNOSIS — Z34.80 PRENATAL CARE OF MULTIGRAVIDA, ANTEPARTUM: Primary | ICD-10-CM

## 2018-09-05 DIAGNOSIS — O10.019 PRE-EXISTING ESSENTIAL HYPERTENSION DURING PREGNANCY, ANTEPARTUM: ICD-10-CM

## 2018-09-05 DIAGNOSIS — Z3A.28 28 WEEKS GESTATION OF PREGNANCY: ICD-10-CM

## 2018-09-05 LAB
GTT, 1 HR, GLUCOLA, EXTERNAL: 122
HCT, EXTERNAL: 36.4
HGB, EXTERNAL: 11.9
PLATELET CNT,   EXTERNAL: 239

## 2018-09-05 NOTE — PROGRESS NOTES
_ [facility]  [email or web address]  [phone]    Artie Kidd MD, FACOG     Follow-up OB visit    Chief Complaint   Patient presents with    Routine Prenatal Visit       Vitals:    18 0958   BP: 118/74   Weight: 211 lb (95.7 kg)       Patient Active Problem List    Diagnosis Date Noted    Prenatal care of multigravida, antepartum 2018     Priority: 1 - One    Pre-existing essential hypertension during pregnancy, antepartum 2018    Hypertension during pregnancy, antepartum 2018    Hyperprolactinemia ([address] 2016    HTN (hypertension), benign 2015        The patient reports the following concerns: none    See PN flowsheet for exam    28 y.o.  28w2d   Encounter Diagnoses   Name Primary?  Pre-existing essential hypertension during pregnancy, antepartum     Prenatal care of multigravida, antepartum Yes    28 weeks gestation of pregnancy         [] SAB/bleeding precautions reviewed   [x] PTL/PPROM precautions reviewed   [] Labor precautions reviewed   [] Fetal kick counts discussed   [] Labs reviewed with patient   [] Selam Evens precautions reviewed   [x] Consent reviewed   [] Handouts given to pt   [] Glucola handout    [] GBS/labor/Magic Hour handout   []    [] We reviewed CDC recommendations for Tdap for patient and close contacts and RBA of receiving in pregnancy, advised obtaining in third trimester   [] Reviewed healthy nutrition in pregnancy and good exercise practices   [] We disc safer medications in pregnancy and referred patient to [facility] resources   [] We reviewed CDC recommendations for flu vaccine and RBA of receiving in pregnancy   []    []    []         Follow-up Disposition:  Return in about 2 weeks (around 2018) for Follow up OB visit.     Orders Placed This Encounter    CBC WITH AUTOMATED DIFF    GLUCOSE, GESTATIONAL 1 HR TOLERANCE    METABOLIC PANEL, COMPREHENSIVE       Artie Kidd MD

## 2018-09-05 NOTE — PATIENT INSTRUCTIONS
Thank you for choosing [address]. We know you have many options when it comes to your healthcare; we appreciate that you picked us. Our goal is to provide exceptional service and world class care for every patient. You may receive a survey in the mail or by email asking for your feedback. Please take a few minutes to share your thoughts about your recent visit. Your comments helps us understand what we do well and what we can do better. To ensure confidentiality, this survey is administered by an independent third-party, [address] [place] [address] participation will help us to continue and improve the quality of care that we provide to you, your family, friends, and neighbors. Thank you! Weeks 30 to 32 of Your Pregnancy: Care Instructions  Your Care Instructions    You have made it to the final months of your pregnancy. By now, your baby is really starting to look like a baby, with hair and plump skin. As you enter the final weeks of pregnancy, the reality of having a baby may start to set in. This is the time to settle on a name, get your household in order, set up a safe nursery, and find quality  if needed. Doing these things in advance will allow you to focus on caring for and enjoying your new baby. You may also want to have a tour of your hospital's labor and delivery unit to get a better idea of what to expect while you are in the hospital.  During these last months, it is very important to take good care of yourself and pay attention to what your body needs. If your doctor says it is okay for you to work, don't push yourself too hard. Use the tips provided in this care sheet to ease heartburn and care for varicose veins. If you haven't already had the Tdap shot during this pregnancy, talk to your doctor about getting it. It will help protect your  against pertussis infection. Follow-up care is a key part of your treatment and safety.  Be sure to make and go to all appointments, and call your doctor if you are having problems. It's also a good idea to know your test results and keep a list of the medicines you take. How can you care for yourself at home? Pay attention to your baby's movements  · You should feel your baby move several times every day. · Your baby now turns less, and kicks and jabs more. · Your baby sleeps 20 to 45 minutes at a time and is more active at certain times of day. · If your doctor wants you to count your baby's kicks:  ¨ Empty your bladder, and lie on your side or relax in a comfortable chair. ¨ Write down your start time. ¨ Pay attention only to your baby's movements. Count any movement except hiccups. ¨ After you have counted 10 movements, write down your stop time. ¨ Write down how many minutes it took for your baby to move 10 times. ¨ If an hour goes by and you have not recorded 10 movements, have something to eat or drink and then count for another hour. If you do not record 10 movements in either hour, call your doctor. Ease heartburn  · Eat small, frequent meals. · Do not eat chocolate, peppermint, or very spicy foods. Avoid drinks with caffeine, such as coffee, tea, and sodas. · Avoid bending over or lying down after meals. · Talk a short walk after you eat. · If heartburn is a problem at night, do not eat for 2 hours before bedtime. · Take antacids like Mylanta, Maalox, Rolaids, or Tums. Do not take antacids that have sodium bicarbonate. Care for varicose veins  · Varicose veins are blood vessels that stretch out with the extra blood during pregnancy. Your legs may ache or throb. Most varicose veins will go away after the birth. · Avoid standing for long periods of time. Sit with your legs crossed at the ankles, not the knees. · Sit with your feet propped up. · Avoid tight clothing or stockings. Wear support hose. · Exercise regularly. Try walking for at least 30 minutes a day.   Where can you learn more?  Go to http://shikha-aleksandar.info/. Enter X107 in the search box to learn more about \"Weeks 30 to 32 of Your Pregnancy: Care Instructions. \"  Current as of: 2017  Content Version: 11.7  © 1746-2457 [employer]. Care instructions adapted under license by [employer] (which disclaims liability or warranty for this information). If you have questions about a medical condition or this instruction, always ask your healthcare professional. [address] any warranty or liability for your use of this information. Weeks 26 to 30 of Your Pregnancy: Care Instructions  Your Care Instructions    You are now in your last trimester of pregnancy. Your baby is growing rapidly. And you'll probably feel your baby moving around more often. Your doctor may ask you to count your baby's kicks. Your back may ache as your body gets used to your baby's size and length. If you haven't already had the Tdap shot during this pregnancy, talk to your doctor about getting it. It will help protect your  against pertussis infection. During this time, it's important to take care of yourself and pay attention to what your body needs. If you feel sexual, explore ways to be close with your partner that match your comfort and desire. Use the tips provided in this care sheet to find ways to be sexual in your own way. Follow-up care is a key part of your treatment and safety. Be sure to make and go to all appointments, and call your doctor if you are having problems. It's also a good idea to know your test results and keep a list of the medicines you take. How can you care for yourself at home? Take it easy at work  · Take frequent breaks. If possible, stop working when you are tired, and rest during your lunch hour. · Take bathroom breaks every 2 hours. · Change positions often. If you sit for long periods, stand up and walk around.   · When you stand for a long time, keep one foot on a low stool with your knee bent. After standing a lot, sit with your feet up. · Avoid fumes, chemicals, and tobacco smoke. Be sexual in your own way  · Having sex during pregnancy is okay, unless your doctor tells you not to. · You may be very interested in sex, or you may have no interest at all. · Your growing belly can make it hard to find a good position during intercourse. [place] and explore. · You may get cramps in your uterus when your partner touches your breasts. · A back rub may relieve the backache or cramps that sometimes follow orgasm. Learn about  labor  · Watch for signs of  labor. You may be going into labor if:  ¨ You have menstrual-like cramps, with or without nausea. ¨ You have about 6 or more contractions in 1 hour, even after you have had a glass of water and are resting. ¨ You have a low, dull backache that does not go away when you change your position. ¨ You have pain or pressure in your pelvis that comes and goes in a pattern. ¨ You have intestinal cramping or flu-like symptoms, with or without diarrhea. ¨ You notice an increase or change in your vaginal discharge. Discharge may be heavy, mucus-like, watery, or streaked with blood. ¨ Your water breaks. · If you think you have  labor:  ¨ Drink 2 or 3 glasses of water or juice. Not drinking enough fluids can cause contractions. ¨ Stop what you are doing, and empty your bladder. Then lie down on your left side for at least 1 hour. ¨ While lying on your side, find your breast bone. Put your fingers in the soft spot just below it. Move your fingers down toward your belly button to find the top of your uterus. Check to see if it is tight. ¨ Contractions can be weak or strong. Record your contractions for an hour. Time a contraction from the start of one contraction to the start of the next one.   ¨ Single or several strong contractions without a pattern are called Hector-Murphy contractions. They are practice contractions but not the start of labor. They often stop if you change what you are doing. ¨ Call your doctor if you have regular contractions. Where can you learn more? Go to http://shikha-aleksandar.info/. Enter T041 in the search box to learn more about \"Weeks 26 to 30 of Your Pregnancy: Care Instructions. \"  Current as of: November 21, 2017  Content Version: 11.7  © 8793-5693 [employer], [employer]. Care instructions adapted under license by [employer] (which disclaims liability or warranty for this information). If you have questions about a medical condition or this instruction, always ask your healthcare professional. [address] any warranty or liability for your use of this information.

## 2018-09-05 NOTE — MR AVS SNAPSHOT
[address] [place] [address] [place] [address] [place] [address] 
[phone] Patient: Neda Chavez MRN: [id number] BRANDON:[birth year] Visit Information Date & Time Provider Department Dept. Phone Encounter #  
 9/5/2018  9:50 AM MD Matthew Lopez [phone] [id number] Follow-up Instructions Return in about 2 weeks (around 9/19/2018) for Follow up OB visit. 9/20/2018  9:40 AM  
OB VISIT with MD Matthew Lopez ([address] [place] [address]) Appt Note: fob  
 [address] [facility] [phone]  
[address] Upcoming Health Maintenance Date Due Influenza Age 5 to Adult 8/1/2018 OB 3RD TRIMESTER TDAP 8/27/2018 PAP AKA CERVICAL CYTOLOGY 4/4/2021 Allergies as of 9/5/2018  Review Complete On: 9/5/2018 By: Kassandra Bueno MD  
  
 Severity Noted Reaction Type Reactions Cefzil [Cefprozil]  01/29/2014    Hives Tolerated z-shari, amoxicillin Current Immunizations  Reviewed on 10/31/2014 Name Date Influenza Vaccine 9/24/2014 Tdap 8/26/2014 Not reviewed this visit You Were Diagnosed With   
  
 Codes Comments Prenatal care of multigravida, antepartum    -  Primary ICD-10-CM: Z34.80 ICD-9-CM: V22.1 Pre-existing essential hypertension during pregnancy, antepartum     ICD-10-CM: O10.019 ICD-9-CM: 642.03   
 28 weeks gestation of pregnancy     ICD-10-CM: Z3A.28 
ICD-9-CM: V22.2 Vitals LMP OB Status Smoking Status 02/19/2018 (Exact Date) Pregnant Never Smoker Preferred Pharmacy Pharmacy Name Phone [facility] [address], [address] [place] [address]  [phone] Your Updated Medication List  
  
   
This list is accurate as of 9/5/18 10:01 AM.  Always use your most recent med list.  
  
  
  
  
 CALCIUM PO  
 Take  by mouth. PRENATAL VITAMIN PO Take  by mouth. ZyrTEC 10 mg Cap Generic drug:  Cetirizine Take  by mouth. Follow-up Instructions Return in about 2 weeks (around 9/19/2018) for Follow up OB visit. To-Do List   
 09/06/2018 10:15 AM  
  Appointment with ULTRASOUND 1 [facility] at [facility] ([phone]) Patient Instructions Thank you for choosing [facility]. We know you have many options when it comes to your healthcare; we appreciate that you picked us. Our goal is to provide exceptional service and world class care for every patient. You may receive a survey in the mail or by email asking for your feedback. Please take a few minutes to share your thoughts about your recent visit. Your comments helps us understand what we do well and what we can do better. To ensure confidentiality, this survey is administered by an independent third-party, [facility] [place] participation will help us to continue and improve the quality of care that we provide to you, your family, friends, and neighbors. Thank you! Weeks 30 to 32 of Your Pregnancy: Care Instructions Your Care Instructions You have made it to the final months of your pregnancy. By now, your baby is really starting to look like a baby, with hair and plump skin. As you enter the final weeks of pregnancy, the reality of having a baby may start to set in. This is the time to settle on a name, get your household in order, set up a safe nursery, and find quality  if needed. Doing these things in advance will allow you to focus on caring for and enjoying your new baby.  You may also want to have a tour of your hospital's labor and delivery unit to get a better idea of what to expect while you are in the hospital. 
During these last months, it is very important to take good care of yourself and pay attention to what your body needs. If your doctor says it is okay for you to work, don't push yourself too hard. Use the tips provided in this care sheet to ease heartburn and care for varicose veins. If you haven't already had the Tdap shot during this pregnancy, talk to your doctor about getting it. It will help protect your  against pertussis infection. Follow-up care is a key part of your treatment and safety. Be sure to make and go to all appointments, and call your doctor if you are having problems. It's also a good idea to know your test results and keep a list of the medicines you take. How can you care for yourself at home? Pay attention to your baby's movements · You should feel your baby move several times every day. · Your baby now turns less, and kicks and jabs more. · Your baby sleeps 20 to 45 minutes at a time and is more active at certain times of day. · If your doctor wants you to count your baby's kicks: 
¨ Empty your bladder, and lie on your side or relax in a comfortable chair. ¨ Write down your start time. ¨ Pay attention only to your baby's movements. Count any movement except hiccups. ¨ After you have counted 10 movements, write down your stop time. ¨ Write down how many minutes it took for your baby to move 10 times. ¨ If an hour goes by and you have not recorded 10 movements, have something to eat or drink and then count for another hour. If you do not record 10 movements in either hour, call your doctor. Ease heartburn · Eat small, frequent meals. · Do not eat chocolate, peppermint, or very spicy foods. Avoid drinks with caffeine, such as coffee, tea, and sodas. · Avoid bending over or lying down after meals. · Talk a short walk after you eat. · If heartburn is a problem at night, do not eat for 2 hours before bedtime. · Take antacids like Mylanta, Maalox, Rolaids, or Tums. Do not take antacids that have sodium bicarbonate. Care for varicose veins · Varicose veins are blood vessels that stretch out with the extra blood during pregnancy. Your legs may ache or throb. Most varicose veins will go away after the birth. · Avoid standing for long periods of time. Sit with your legs crossed at the ankles, not the knees. · Sit with your feet propped up. · Avoid tight clothing or stockings. Wear support hose. · Exercise regularly. Try walking for at least 30 minutes a day. Where can you learn more? Go to http://shikha-aleksandar.info/. Enter D473 in the search box to learn more about \"Weeks 30 to 32 of Your Pregnancy: Care Instructions. \" Current as of: 2017 Content Version: 11.7 © 6735-4433 collegefeed. Care instructions adapted under license by Ooploo (which disclaims liability or warranty for this information). If you have questions about a medical condition or this instruction, always ask your healthcare professional. [place] [address] any warranty or liability for your use of this information. Weeks 26 to 30 of Your Pregnancy: Care Instructions Your Care Instructions You are now in your last trimester of pregnancy. Your baby is growing rapidly. And you'll probably feel your baby moving around more often. Your doctor may ask you to count your baby's kicks. Your back may ache as your body gets used to your baby's size and length. If you haven't already had the Tdap shot during this pregnancy, talk to your doctor about getting it. It will help protect your  against pertussis infection. During this time, it's important to take care of yourself and pay attention to what your body needs. If you feel sexual, explore ways to be close with your partner that match your comfort and desire. Use the tips provided in this care sheet to find ways to be sexual in your own way. Follow-up care is a key part of your treatment and safety.  Be sure to make and go to all appointments, and call your doctor if you are having problems. It's also a good idea to know your test results and keep a list of the medicines you take. How can you care for yourself at home? Take it easy at work · Take frequent breaks. If possible, stop working when you are tired, and rest during your lunch hour. · Take bathroom breaks every 2 hours. · Change positions often. If you sit for long periods, stand up and walk around. · When you stand for a long time, keep one foot on a low stool with your knee bent. After standing a lot, sit with your feet up. · Avoid fumes, chemicals, and tobacco smoke. Be sexual in your own way · Having sex during pregnancy is okay, unless your doctor tells you not to. · You may be very interested in sex, or you may have no interest at all. · Your growing belly can make it hard to find a good position during intercourse. Penhook and explore. · You may get cramps in your uterus when your partner touches your breasts. · A back rub may relieve the backache or cramps that sometimes follow orgasm. Learn about  labor · Watch for signs of  labor. You may be going into labor if: 
¨ You have menstrual-like cramps, with or without nausea. ¨ You have about 6 or more contractions in 1 hour, even after you have had a glass of water and are resting. ¨ You have a low, dull backache that does not go away when you change your position. ¨ You have pain or pressure in your pelvis that comes and goes in a pattern. ¨ You have intestinal cramping or flu-like symptoms, with or without diarrhea. ¨ You notice an increase or change in your vaginal discharge. Discharge may be heavy, mucus-like, watery, or streaked with blood. ¨ Your water breaks. · If you think you have  labor: ¨ Drink 2 or 3 glasses of water or juice. Not drinking enough fluids can cause contractions. ¨ Stop what you are doing, and empty your bladder.  Then lie down on your left side for at least 1 hour. ¨ While lying on your side, find your breast bone. Put your fingers in the soft spot just below it. Move your fingers down toward your belly button to find the top of your uterus. Check to see if it is tight. ¨ Contractions can be weak or strong. Record your contractions for an hour. Time a contraction from the start of one contraction to the start of the next one. ¨ Single or several strong contractions without a pattern are called Wilson-Murphy contractions. They are practice contractions but not the start of labor. They often stop if you change what you are doing. ¨ Call your doctor if you have regular contractions. Where can you learn more? Go to http://shikha-aleksandar.info/. Enter A244 in the search box to learn more about \"Weeks 26 to 30 of Your Pregnancy: Care Instructions. \" Current as of: November 21, 2017 Content Version: 11.7 © 0781-3521 Quantenna Communications. Care instructions adapted under license by Swivl (which disclaims liability or warranty for this information). If you have questions about a medical condition or this instruction, always ask your healthcare professional. [address] any warranty or liability for your use of this information. Introducing [facility]! Dear [place]: 
Thank you for requesting a MOVE Guides account. Our records indicate that you already have an active MOVE Guides account. You can access your account anytime at [email or web address] Did you know that you can access your hospital and ER discharge instructions at any time in MOVE Guides? You can also review all of your test results from your hospital stay or ER visit. Additional Information If you have questions, please visit the Frequently Asked Questions section of the MOVE Guides website at [email or web address] Remember, MOVE Guides is NOT to be used for urgent needs.  For medical emergencies, dial 911. Now available from your iPhone and Android! Please provide this summary of care documentation to your next provider. Your primary care clinician is listed as Denver Rands. If you have any questions after today's visit, please call [phone].

## 2018-09-06 ENCOUNTER — HOSPITAL ENCOUNTER (OUTPATIENT)
Dept: PERINATAL CARE | Age: 33
Discharge: HOME | End: 2018-09-06
Attending: OBSTETRICS & GYNECOLOGY
Payer: COMMERCIAL

## 2018-09-06 LAB
ALBUMIN SERPL-MCNC: 3.6 G/DL (ref 3.5–5.5)
ALBUMIN/GLOB SERPL: 1.4 {RATIO} (ref 1.2–2.2)
ALP SERPL-CCNC: 76 IU/L (ref 39–117)
ALT SERPL-CCNC: 15 IU/L (ref 0–32)
AST SERPL-CCNC: 16 IU/L (ref 0–40)
BASOPHILS # BLD AUTO: 0.1 X10E3/UL (ref 0–0.2)
BASOPHILS NFR BLD AUTO: 0 %
BILIRUB SERPL-MCNC: 0.3 MG/DL (ref 0–1.2)
BUN SERPL-MCNC: 6 MG/DL (ref 6–20)
BUN/CREAT SERPL: 11 (ref 9–23)
CALCIUM SERPL-MCNC: 9.4 MG/DL (ref 8.7–10.2)
CHLORIDE SERPL-SCNC: 103 MMOL/L (ref 96–106)
CO2 SERPL-SCNC: 21 MMOL/L (ref 20–29)
CREAT SERPL-MCNC: 0.55 MG/DL (ref 0.57–1)
EOSINOPHIL # BLD AUTO: 0.1 X10E3/UL (ref 0–0.4)
EOSINOPHIL NFR BLD AUTO: 1 %
ERYTHROCYTE [DISTWIDTH] IN BLOOD BY AUTOMATED COUNT: 13 % (ref 12.3–15.4)
GLOBULIN SER CALC-MCNC: 2.5 G/DL (ref 1.5–4.5)
GLUCOSE 1H P 50 G GLC PO SERPL-MCNC: 122 MG/DL (ref 65–139)
GLUCOSE SERPL-MCNC: 115 MG/DL (ref 65–99)
HCT VFR BLD AUTO: 36.4 % (ref 34–46.6)
HGB BLD-MCNC: 11.9 G/DL (ref 11.1–15.9)
IMM GRANULOCYTES # BLD: 0.2 X10E3/UL (ref 0–0.1)
IMM GRANULOCYTES NFR BLD: 1 %
LYMPHOCYTES # BLD AUTO: 1.7 X10E3/UL (ref 0.7–3.1)
LYMPHOCYTES NFR BLD AUTO: 12 %
MCH RBC QN AUTO: 29.8 PG (ref 26.6–33)
MCHC RBC AUTO-ENTMCNC: 32.7 G/DL (ref 31.5–35.7)
MCV RBC AUTO: 91 FL (ref 79–97)
MONOCYTES # BLD AUTO: 0.8 X10E3/UL (ref 0.1–0.9)
MONOCYTES NFR BLD AUTO: 6 %
NEUTROPHILS # BLD AUTO: 11.3 X10E3/UL (ref 1.4–7)
NEUTROPHILS NFR BLD AUTO: 80 %
PLATELET # BLD AUTO: 239 X10E3/UL (ref 150–379)
POTASSIUM SERPL-SCNC: 3.9 MMOL/L (ref 3.5–5.2)
PROT SERPL-MCNC: 6.1 G/DL (ref 6–8.5)
RBC # BLD AUTO: 3.99 X10E6/UL (ref 3.77–5.28)
SODIUM SERPL-SCNC: 139 MMOL/L (ref 134–144)
WBC # BLD AUTO: 14.1 X10E3/UL (ref 3.4–10.8)

## 2018-09-06 PROCEDURE — 76816 OB US FOLLOW-UP PER FETUS: CPT | Performed by: OBSTETRICS & GYNECOLOGY

## 2018-09-06 NOTE — PROGRESS NOTES
Normal results, add to prenatal records. We can review in detail with patient at next visit.   Add PIH labs to pl with date

## 2018-09-08 ENCOUNTER — TELEPHONE (OUTPATIENT)
Dept: OBGYN CLINIC | Age: 33
End: 2018-09-08

## 2018-09-20 ENCOUNTER — ROUTINE PRENATAL (OUTPATIENT)
Dept: OBGYN CLINIC | Age: 33
End: 2018-09-20

## 2018-09-20 VITALS
DIASTOLIC BLOOD PRESSURE: 74 MMHG | HEIGHT: 67 IN | WEIGHT: 214 LBS | BODY MASS INDEX: 33.59 KG/M2 | SYSTOLIC BLOOD PRESSURE: 118 MMHG

## 2018-09-20 DIAGNOSIS — Z23 ENCOUNTER FOR IMMUNIZATION: ICD-10-CM

## 2018-09-20 DIAGNOSIS — O10.019 PRE-EXISTING ESSENTIAL HYPERTENSION DURING PREGNANCY, ANTEPARTUM: Primary | ICD-10-CM

## 2018-09-20 NOTE — MR AVS SNAPSHOT
[facility] [address] [place] [address] 
[phone] Patient: Chanel Guerrero MRN: [id number] MNL:99[birth year]1/[birth year] Visit Information Date & Time Provider Department Dept. Phone Encounter #  
 9/20/2018  9:40 AM MD Matthew Tony [phone] [id number] Your Appointments 11/19/2018  7:00 AM  
PROCEDURE with MD Matthew Tony ([address]) Appt Note: Induction [address] [facility] [address] [place] [address]  
[address] [place] [address] Upcoming Health Maintenance Date Due Influenza Age 5 to Adult 8/1/2018 OB 3RD TRIMESTER TDAP 8/27/2018 PAP AKA CERVICAL CYTOLOGY 4/4/2021 Allergies as of 9/20/2018  Review Complete On: 9/20/2018 By: Matt Monahan MD  
  
 Severity Noted Reaction Type Reactions Cefzil [Cefprozil]  01/29/2014    Hives Tolerated z-shari, amoxicillin Current Immunizations  Reviewed on 10/31/2014 Name Date Influenza Vaccine 9/24/2014 Tdap 8/26/2014 Not reviewed this visit Vitals BP Height(growth percentile) Weight(growth percentile) LMP BMI OB Status 118/74 5' 7\" (1.702 m) 214 lb (97.1 kg) 02/19/2018 (Exact Date) 33.52 kg/m2 Pregnant Smoking Status Never Smoker BMI and BSA Data Body Mass Index Body Surface Area  
 33.52 kg/m 2 2.14 m 2 Preferred Pharmacy Pharmacy Name Phone [facility] [address] , [address] [place] [address]  [phone] Your Updated Medication List  
  
   
This list is accurate as of 9/20/18 10:08 AM.  Always use your most recent med list.  
  
  
  
  
 CALCIUM PO Take  by mouth. PRENATAL VITAMIN PO Take  by mouth. ZyrTEC 10 mg Cap Generic drug:  Cetirizine Take  by mouth. Patient Instructions Weeks 30 to 32 of Your Pregnancy: Care Instructions Your Care Instructions You have made it to the final months of your pregnancy. By now, your baby is really starting to look like a baby, with hair and plump skin. As you enter the final weeks of pregnancy, the reality of having a baby may start to set in. This is the time to settle on a name, get your household in order, set up a safe nursery, and find quality  if needed. Doing these things in advance will allow you to focus on caring for and enjoying your new baby. You may also want to have a tour of your hospital's labor and delivery unit to get a better idea of what to expect while you are in the hospital. 
During these last months, it is very important to take good care of yourself and pay attention to what your body needs. If your doctor says it is okay for you to work, don't push yourself too hard. Use the tips provided in this care sheet to ease heartburn and care for varicose veins. If you haven't already had the Tdap shot during this pregnancy, talk to your doctor about getting it. It will help protect your  against pertussis infection. Follow-up care is a key part of your treatment and safety. Be sure to make and go to all appointments, and call your doctor if you are having problems. It's also a good idea to know your test results and keep a list of the medicines you take. How can you care for yourself at home? Pay attention to your baby's movements · You should feel your baby move several times every day. · Your baby now turns less, and kicks and jabs more. · Your baby sleeps 20 to 45 minutes at a time and is more active at certain times of day. · If your doctor wants you to count your baby's kicks: 
¨ Empty your bladder, and lie on your side or relax in a comfortable chair. ¨ Write down your start time. ¨ Pay attention only to your baby's movements. Count any movement except hiccups. ¨ After you have counted 10 movements, write down your stop time. ¨ Write down how many minutes it took for your baby to move 10 times. ¨ If an hour goes by and you have not recorded 10 movements, have something to eat or drink and then count for another hour. If you do not record 10 movements in either hour, call your doctor. Ease heartburn · Eat small, frequent meals. · Do not eat chocolate, peppermint, or very spicy foods. Avoid drinks with caffeine, such as coffee, tea, and sodas. · Avoid bending over or lying down after meals. · Talk a short walk after you eat. · If heartburn is a problem at night, do not eat for 2 hours before bedtime. · Take antacids like Mylanta, Maalox, Rolaids, or Tums. Do not take antacids that have sodium bicarbonate. Care for varicose veins · Varicose veins are blood vessels that stretch out with the extra blood during pregnancy. Your legs may ache or throb. Most varicose veins will go away after the birth. · Avoid standing for long periods of time. Sit with your legs crossed at the ankles, not the knees. · Sit with your feet propped up. · Avoid tight clothing or stockings. Wear support hose. · Exercise regularly. Try walking for at least 30 minutes a day. Where can you learn more? Go to http://shikha-aleksandar.info/. Enter S366 in the search box to learn more about \"Weeks 30 to 32 of Your Pregnancy: Care Instructions. \" Current as of: November 21, 2017 Content Version: 11.7 © 2995-7797 Autotether. Care instructions adapted under license by Medivo (which disclaims liability or warranty for this information). If you have questions about a medical condition or this instruction, always ask your healthcare professional. [place] [address] any warranty or liability for your use of this information. Introducing [facility]!    
 Dear Orquidea Wheat: 
 Thank you for requesting a [employer] account. Our records indicate that you already have an active [employer] account. You can access your account anytime at [email or web address] Did you know that you can access your hospital and ER discharge instructions at any time in [employer] You can also review all of your test results from your hospital stay or ER visit. Additional Information If you have questions, please visit the Frequently Asked Questions section of the [employer] website at [email or web address] Remember, [employer] is NOT to be used for urgent needs. For medical emergencies, dial 911. Now available from your iPhone and Android! Please provide this summary of care documentation to your next provider. Your primary care clinician is listed as Danielito Gordon. If you have any questions after today's visit, please call [phone].

## 2018-09-20 NOTE — PROGRESS NOTES
After obtaining consent, and per orders of dr Daja Beckett, injection of tdap given in left arm by Carmenza Milton RN. Patient instructed to remain in clinic for 20 minutes afterwards, and to report any adverse reaction to me immediately. Lot: [id number] Exp: 02/07/21 NDC: 19727-111-03 , VIS given. After obtaining consent, and per orders of dr Daja Beckett, injection of flu given in right arm by Carmenza Milton RN. Patient instructed to remain in clinic for 20 minutes afterwards, and to report any adverse reaction to me immediately. Lot: [id number] Exp: 06/30/19 ND: [id number] , VIS given.

## 2018-09-20 NOTE — PATIENT INSTRUCTIONS
Weeks 30 to 32 of Your Pregnancy: Care Instructions  Your Care Instructions    You have made it to the final months of your pregnancy. By now, your baby is really starting to look like a baby, with hair and plump skin. As you enter the final weeks of pregnancy, the reality of having a baby may start to set in. This is the time to settle on a name, get your household in order, set up a safe nursery, and find quality  if needed. Doing these things in advance will allow you to focus on caring for and enjoying your new baby. You may also want to have a tour of your hospital's labor and delivery unit to get a better idea of what to expect while you are in the hospital.  During these last months, it is very important to take good care of yourself and pay attention to what your body needs. If your doctor says it is okay for you to work, don't push yourself too hard. Use the tips provided in this care sheet to ease heartburn and care for varicose veins. If you haven't already had the Tdap shot during this pregnancy, talk to your doctor about getting it. It will help protect your  against pertussis infection. Follow-up care is a key part of your treatment and safety. Be sure to make and go to all appointments, and call your doctor if you are having problems. It's also a good idea to know your test results and keep a list of the medicines you take. How can you care for yourself at home? Pay attention to your baby's movements  · You should feel your baby move several times every day. · Your baby now turns less, and kicks and jabs more. · Your baby sleeps 20 to 45 minutes at a time and is more active at certain times of day. · If your doctor wants you to count your baby's kicks:  ¨ Empty your bladder, and lie on your side or relax in a comfortable chair. ¨ Write down your start time. ¨ Pay attention only to your baby's movements. Count any movement except hiccups.   ¨ After you have counted 10 movements, write down your stop time. ¨ Write down how many minutes it took for your baby to move 10 times. ¨ If an hour goes by and you have not recorded 10 movements, have something to eat or drink and then count for another hour. If you do not record 10 movements in either hour, call your doctor. Ease heartburn  · Eat small, frequent meals. · Do not eat chocolate, peppermint, or very spicy foods. Avoid drinks with caffeine, such as coffee, tea, and sodas. · Avoid bending over or lying down after meals. · Talk a short walk after you eat. · If heartburn is a problem at night, do not eat for 2 hours before bedtime. · Take antacids like Mylanta, Maalox, Rolaids, or Tums. Do not take antacids that have sodium bicarbonate. Care for varicose veins  · Varicose veins are blood vessels that stretch out with the extra blood during pregnancy. Your legs may ache or throb. Most varicose veins will go away after the birth. · Avoid standing for long periods of time. Sit with your legs crossed at the ankles, not the knees. · Sit with your feet propped up. · Avoid tight clothing or stockings. Wear support hose. · Exercise regularly. Try walking for at least 30 minutes a day. Where can you learn more? Go to http://shikha-aleksandar.info/. Enter B335 in the search box to learn more about \"Weeks 30 to 32 of Your Pregnancy: Care Instructions. \"  Current as of: November 21, 2017  Content Version: 11.7  © 2574-4005 VocalizeLocal. Care instructions adapted under license by TableConnect [employer] (which disclaims liability or warranty for this information). If you have questions about a medical condition or this instruction, always ask your healthcare professional. [place] [address] any warranty or liability for your use of this information.

## 2018-09-20 NOTE — PROGRESS NOTES
_ 164 [facility]  [email or web address]  [phone]    Javier Huddleston MD, FACOG     Follow-up OB visit    Chief Complaint   Patient presents with    Routine Prenatal Visit       Vitals:    18 1004   BP: 118/74   Weight: 214 lb (97.1 kg)   Height: 5' 7\" (1.702 m)       Patient Active Problem List    Diagnosis Date Noted    Prenatal care of multigravida, antepartum 2018     Priority: 1 - One    Pre-existing essential hypertension during pregnancy, antepartum 2018    Hypertension during pregnancy, antepartum 2018    Hyperprolactinemia ([address] 2016    HTN (hypertension), benign 2015        The patient reports the following concerns: none    See PN flowsheet for exam    28 y.o.  30w3d   Encounter Diagnosis   Name Primary?  Pre-existing essential hypertension during pregnancy, antepartum Yes        [] SAB/bleeding precautions reviewed   [x] PTL/PPROM precautions reviewed   [] Labor precautions reviewed   [] Fetal kick counts discussed   [] Labs reviewed with patient   [] Percell Cale precautions reviewed   [] Consent reviewed   [] Handouts given to pt   [] Glucola handout    [] GBS/labor/Magic Hour handout   []    [] We reviewed CDC recommendations for Tdap for patient and close contacts and RBA of receiving in pregnancy, advised obtaining in third trimester   [] Reviewed healthy nutrition in pregnancy and good exercise practices   [] We disc safer medications in pregnancy and referred patient to [facility] MATT resources   [] We reviewed CDC recommendations for flu vaccine and RBA of receiving in pregnancy   []    []    []         Follow-up Disposition:  Return in about 2 weeks (around 10/4/2018) for Follow up OB visit. No orders of the defined types were placed in this encounter.       Javier Huddleston MD

## 2018-10-02 ENCOUNTER — ROUTINE PRENATAL (OUTPATIENT)
Dept: OBGYN CLINIC | Age: 33
End: 2018-10-02

## 2018-10-02 VITALS
HEIGHT: 67 IN | DIASTOLIC BLOOD PRESSURE: 68 MMHG | BODY MASS INDEX: 34.12 KG/M2 | WEIGHT: 217.38 LBS | SYSTOLIC BLOOD PRESSURE: 116 MMHG

## 2018-10-02 DIAGNOSIS — Z34.80 PRENATAL CARE OF MULTIGRAVIDA, ANTEPARTUM: Primary | ICD-10-CM

## 2018-10-02 DIAGNOSIS — O10.019 PRE-EXISTING ESSENTIAL HYPERTENSION DURING PREGNANCY, ANTEPARTUM: ICD-10-CM

## 2018-10-02 NOTE — PATIENT INSTRUCTIONS
Weeks 30 to 32 of Your Pregnancy: Care Instructions  Your Care Instructions    You have made it to the final months of your pregnancy. By now, your baby is really starting to look like a baby, with hair and plump skin. As you enter the final weeks of pregnancy, the reality of having a baby may start to set in. This is the time to settle on a name, get your household in order, set up a safe nursery, and find quality  if needed. Doing these things in advance will allow you to focus on caring for and enjoying your new baby. You may also want to have a tour of your hospital's labor and delivery unit to get a better idea of what to expect while you are in the hospital.  During these last months, it is very important to take good care of yourself and pay attention to what your body needs. If your doctor says it is okay for you to work, don't push yourself too hard. Use the tips provided in this care sheet to ease heartburn and care for varicose veins. If you haven't already had the Tdap shot during this pregnancy, talk to your doctor about getting it. It will help protect your  against pertussis infection. Follow-up care is a key part of your treatment and safety. Be sure to make and go to all appointments, and call your doctor if you are having problems. It's also a good idea to know your test results and keep a list of the medicines you take. How can you care for yourself at home? Pay attention to your baby's movements  · You should feel your baby move several times every day. · Your baby now turns less, and kicks and jabs more. · Your baby sleeps 20 to 45 minutes at a time and is more active at certain times of day. · If your doctor wants you to count your baby's kicks:  ¨ Empty your bladder, and lie on your side or relax in a comfortable chair. ¨ Write down your start time. ¨ Pay attention only to your baby's movements. Count any movement except hiccups.   ¨ After you have counted 10 movements, write down your stop time. ¨ Write down how many minutes it took for your baby to move 10 times. ¨ If an hour goes by and you have not recorded 10 movements, have something to eat or drink and then count for another hour. If you do not record 10 movements in either hour, call your doctor. Ease heartburn  · Eat small, frequent meals. · Do not eat chocolate, peppermint, or very spicy foods. Avoid drinks with caffeine, such as coffee, tea, and sodas. · Avoid bending over or lying down after meals. · Talk a short walk after you eat. · If heartburn is a problem at night, do not eat for 2 hours before bedtime. · Take antacids like Mylanta, Maalox, Rolaids, or Tums. Do not take antacids that have sodium bicarbonate. Care for varicose veins  · Varicose veins are blood vessels that stretch out with the extra blood during pregnancy. Your legs may ache or throb. Most varicose veins will go away after the birth. · Avoid standing for long periods of time. Sit with your legs crossed at the ankles, not the knees. · Sit with your feet propped up. · Avoid tight clothing or stockings. Wear support hose. · Exercise regularly. Try walking for at least 30 minutes a day. Where can you learn more? Go to http://shikha-aleksandar.info/. Enter X636 in the search box to learn more about \"Weeks 30 to 32 of Your Pregnancy: Care Instructions. \"  Current as of: November 21, 2017  Content Version: 11.7  © 4619-3713 [employer]. Care instructions adapted under license by [employer] (which disclaims liability or warranty for this information). If you have questions about a medical condition or this instruction, always ask your healthcare professional. [place] [address] any warranty or liability for your use of this information.

## 2018-10-02 NOTE — PROGRESS NOTES
[facility]  [email or web address]  [phone]    Juancarlos Rivera MD, FACOG     Follow-up OB visit    Chief Complaint   Patient presents with    Routine Prenatal Visit       Vitals:    10/02/18 0949   BP: 116/68   Weight: 217 lb 6 oz (98.6 kg)   Height: 5' 7\" (1.702 m)       Patient Active Problem List    Diagnosis Date Noted    Prenatal care of multigravida, antepartum 2018     Priority: 1 - One    Pre-existing essential hypertension during pregnancy, antepartum 2018    Hypertension during pregnancy, antepartum 2018    Hyperprolactinemia ([address] 2016    HTN (hypertension), benign 2015        The patient reports the following concerns: none    See PN flowsheet for exam    28 y.o.  32w1d   Encounter Diagnoses   Name Primary?  Pre-existing essential hypertension during pregnancy, antepartum     Prenatal care of multigravida, antepartum Yes     PIH precaution   [] SAB/bleeding precautions reviewed   [x] PTL/PPROM precautions reviewed   [] Labor precautions reviewed   [] Fetal kick counts discussed   [] Labs reviewed with patient   [] Mellkrystina Arnold precautions reviewed   [] Consent reviewed   [] Handouts given to pt   [] Glucola handout    [] GBS/labor/Magic Hour handout   []    [] We reviewed CDC recommendations for Tdap for patient and close contacts and RBA of receiving in pregnancy, advised obtaining in third trimester   [] Reviewed healthy nutrition in pregnancy and good exercise practices   [] We disc safer medications in pregnancy and referred patient to [facility] MATT resources   [] We reviewed CDC recommendations for flu vaccine and RBA of receiving in pregnancy   []    []    []         Follow-up Disposition:  Return in about 2 weeks (around 10/16/2018) for Follow up OB visit. No orders of the defined types were placed in this encounter.       Juancarlos Rivera MD

## 2018-10-02 NOTE — MR AVS SNAPSHOT
[address] [facility] [address] 
[phone] Patient: Viviana Arshad MRN: [id number] HNO:[id number] Visit Information Date & Time Provider Department Dept. Phone Encounter #  
 10/2/2018  9:40 AM MD Matthew Thakur [phone] [id number] Your Appointments 11/19/2018  7:00 AM  
PROCED[facility] with MD [facility] Appt Note: Induction [address] [facility] [address]  
[phone]  
  
   
 [address] [place] [address] Road  
  
    
  
 10/16/2018  9:40 AM  
OB VISIT with MD Matthew Thakur ([facility]) Appt Note: 2wk fob  
 [address] [place] [address]  
[phone]  
  
   
 [facility] [address] [place] [address]  
  
    
 10/30/2018  9:50 AM  
OB VISIT with MD Matthew Thakur ([facility] Appt Note: 2wk fob  
 [address] [place] [address] Road  
[phone]  
  
    
 11/8/2018  9:40 AM  
OB VISIT with MD Matthew Thakur ([facility]) Appt Note: 1wk fob  
 [address] [facility] [address]  
[phone]  
  
    
 11/16/2018  8:50 AM  
OB VISIT with MD Matthew Thakur ([facility]) Appt Note: 1wk gregg; DR MALIK out of office LMTRS; r/s from 11/15 1wk fob  
 [address] [place] [address] Road  
[phone] Upcoming Health Maintenance Date Due  
 PAP AKA CERVICAL CYTOLOGY 4/4/2021 Allergies as of 10/2/2018  Review Complete On: 10/2/2018 By: Dell Peguero LPN Severity Noted Reaction Type Reactions Cefzil [Cefprozil]  01/29/2014    Hives Tolerated z-shari, amoxicillin Current Immunizations  Reviewed on 10/31/2014 Name Date Influenza Vaccine 9/24/2014 Influenza Vaccine (Quad) PF 9/20/2018 Tdap 9/20/2018, 8/26/2014 Not reviewed this visit Vitals BP Height(growth percentile) Weight(growth percentile) LMP BMI OB Status 116/68 5' 7\" (1.702 m) 217 lb 6 oz (98.6 kg) 02/19/2018 (Exact Date) 34.05 kg/m2 Pregnant Smoking Status Never Smoker BMI and BSA Data Body Mass Index Body Surface Area 34.05 kg/m 2 2.16 m 2 Preferred Pharmacy Pharmacy Name Phone [id number] [address], [id number] [place] [address]  [phone] Your Updated Medication List  
  
   
This list is accurate as of 10/2/18  9:59 AM.  Always use your most recent med list.  
  
  
  
  
 CALCIUM PO Take  by mouth. PRENATAL VITAMIN PO Take  by mouth. ZyrTEC 10 mg Cap Generic drug:  Cetirizine Take  by mouth. To-Do List   
 10/04/2018 10:15 AM  
  Appointment with ULTRASOUND 1 [facility] at [facility] ([phone]) Patient Instructions Weeks 30 to 32 of Your Pregnancy: Care Instructions Your Care Instructions You have made it to the final months of your pregnancy. By now, your baby is really starting to look like a baby, with hair and plump skin. As you enter the final weeks of pregnancy, the reality of having a baby may start to set in. This is the time to settle on a name, get your household in order, set up a safe nursery, and find quality  if needed. Doing these things in advance will allow you to focus on caring for and enjoying your new baby. You may also want to have a tour of your hospital's labor and delivery unit to get a better idea of what to expect while you are in the hospital. 
During these last months, it is very important to take good care of yourself and pay attention to what your body needs. If your doctor says it is okay for you to work, don't push yourself too hard.  Use the tips provided in this care sheet to ease heartburn and care for varicose veins. If you haven't already had the Tdap shot during this pregnancy, talk to your doctor about getting it. It will help protect your  against pertussis infection. Follow-up care is a key part of your treatment and safety. Be sure to make and go to all appointments, and call your doctor if you are having problems. It's also a good idea to know your test results and keep a list of the medicines you take. How can you care for yourself at home? Pay attention to your baby's movements · You should feel your baby move several times every day. · Your baby now turns less, and kicks and jabs more. · Your baby sleeps 20 to 45 minutes at a time and is more active at certain times of day. · If your doctor wants you to count your baby's kicks: 
¨ Empty your bladder, and lie on your side or relax in a comfortable chair. ¨ Write down your start time. ¨ Pay attention only to your baby's movements. Count any movement except hiccups. ¨ After you have counted 10 movements, write down your stop time. ¨ Write down how many minutes it took for your baby to move 10 times. ¨ If an hour goes by and you have not recorded 10 movements, have something to eat or drink and then count for another hour. If you do not record 10 movements in either hour, call your doctor. Ease heartburn · Eat small, frequent meals. · Do not eat chocolate, peppermint, or very spicy foods. Avoid drinks with caffeine, such as coffee, tea, and sodas. · Avoid bending over or lying down after meals. · Talk a short walk after you eat. · If heartburn is a problem at night, do not eat for 2 hours before bedtime. · Take antacids like Mylanta, Maalox, Rolaids, or Tums. Do not take antacids that have sodium bicarbonate. Care for varicose veins · Varicose veins are blood vessels that stretch out with the extra blood during pregnancy. Your legs may ache or throb. Most varicose veins will go away after the birth. · Avoid standing for long periods of time. Sit with your legs crossed at the ankles, not the knees. · Sit with your feet propped up. · Avoid tight clothing or stockings. Wear support hose. · Exercise regularly. Try walking for at least 30 minutes a day. Where can you learn more? Go to http://shikha-aleksandar.info/. Enter I183 in the search box to learn more about \"Weeks 30 to 32 of Your Pregnancy: Care Instructions. \" Current as of: November 21, 2017 Content Version: 11.7 © 1196-4986 [employer]. Care instructions adapted under license by [employer] (which disclaims liability or warranty for this information). If you have questions about a medical condition or this instruction, always ask your healthcare professional. [address] any warranty or liability for your use of this information. Introducing [facility]! Dear Jcarlos Kulkarni: 
Thank you for requesting a HZO account. Our records indicate that you already have an active HZO account. You can access your account anytime at [email or web address]. [employer] Did you know that you can access your hospital and ER discharge instructions at any time in HZO? You can also review all of your test results from your hospital stay or ER visit. Additional Information If you have questions, please visit the Frequently Asked Questions section of the HZO website at [email or web address]/. Remember, HZO is NOT to be used for urgent needs. For medical emergencies, dial 911. Now available from your iPhone and Android! Please provide this summary of care documentation to your next provider. Your primary care clinician is listed as Elvin Ramirez. If you have any questions after today's visit, please call [phone].

## 2018-10-04 ENCOUNTER — HOSPITAL ENCOUNTER (OUTPATIENT)
Dept: PERINATAL CARE | Age: 33
Discharge: HOME | End: 2018-10-04
Attending: OBSTETRICS & GYNECOLOGY
Payer: COMMERCIAL

## 2018-10-04 PROCEDURE — 76816 OB US FOLLOW-UP PER FETUS: CPT | Performed by: OBSTETRICS & GYNECOLOGY

## 2018-10-11 ENCOUNTER — HOSPITAL ENCOUNTER (OUTPATIENT)
Dept: PERINATAL CARE | Age: 33
Discharge: HOME | End: 2018-10-11
Attending: OBSTETRICS & GYNECOLOGY
Payer: COMMERCIAL

## 2018-10-11 PROCEDURE — 76818 FETAL BIOPHYS PROFILE W/NST: CPT | Performed by: OBSTETRICS & GYNECOLOGY

## 2018-10-16 ENCOUNTER — ROUTINE PRENATAL (OUTPATIENT)
Dept: OBGYN CLINIC | Age: 33
End: 2018-10-16

## 2018-10-16 ENCOUNTER — HOSPITAL ENCOUNTER (OUTPATIENT)
Age: 33
Setting detail: OBSERVATION
Discharge: HOME | End: 2018-10-17
Attending: OBSTETRICS & GYNECOLOGY | Admitting: OBSTETRICS & GYNECOLOGY
Payer: COMMERCIAL

## 2018-10-16 VITALS
SYSTOLIC BLOOD PRESSURE: 158 MMHG | BODY MASS INDEX: 34.84 KG/M2 | DIASTOLIC BLOOD PRESSURE: 100 MMHG | HEIGHT: 67 IN | WEIGHT: 222 LBS

## 2018-10-16 DIAGNOSIS — O10.019 PRE-EXISTING ESSENTIAL HYPERTENSION DURING PREGNANCY, ANTEPARTUM: ICD-10-CM

## 2018-10-16 DIAGNOSIS — Z34.80 PRENATAL CARE OF MULTIGRAVIDA, ANTEPARTUM: Primary | ICD-10-CM

## 2018-10-16 PROBLEM — O16.9 HYPERTENSION AFFECTING PREGNANCY, ANTEPARTUM: Status: ACTIVE | Noted: 2018-10-16

## 2018-10-16 LAB
ALBUMIN SERPL-MCNC: 2.7 G/DL (ref 3.5–5)
ALBUMIN/GLOB SERPL: 0.7 {RATIO} (ref 1.1–2.2)
ALP SERPL-CCNC: 109 U/L (ref 45–117)
ALT SERPL-CCNC: 25 U/L (ref 12–78)
ANION GAP SERPL CALC-SCNC: 13 MMOL/L (ref 5–15)
AST SERPL-CCNC: 39 U/L (ref 15–37)
BASOPHILS # BLD: 0.1 K/UL (ref 0–0.1)
BASOPHILS NFR BLD: 1 % (ref 0–1)
BILIRUB SERPL-MCNC: 0.5 MG/DL (ref 0.2–1)
BUN SERPL-MCNC: 6 MG/DL (ref 6–20)
BUN/CREAT SERPL: 20 (ref 12–20)
CALCIUM SERPL-MCNC: 9.6 MG/DL (ref 8.5–10.1)
CHLORIDE SERPL-SCNC: 103 MMOL/L (ref 97–108)
CO2 SERPL-SCNC: 24 MMOL/L (ref 21–32)
CREAT SERPL-MCNC: 0.3 MG/DL (ref 0.55–1.02)
CREAT UR-MCNC: 23.47 MG/DL
DIFFERENTIAL METHOD BLD: ABNORMAL
EOSINOPHIL # BLD: 0.1 K/UL (ref 0–0.4)
EOSINOPHIL NFR BLD: 1 % (ref 0–7)
ERYTHROCYTE [DISTWIDTH] IN BLOOD BY AUTOMATED COUNT: 13 % (ref 11.5–14.5)
GLOBULIN SER CALC-MCNC: 3.8 G/DL (ref 2–4)
GLUCOSE SERPL-MCNC: 83 MG/DL (ref 65–100)
HCT VFR BLD AUTO: 37.5 % (ref 35–47)
HGB BLD-MCNC: 12.5 G/DL (ref 11.5–16)
IMM GRANULOCYTES # BLD: 0.2 K/UL (ref 0–0.04)
IMM GRANULOCYTES NFR BLD AUTO: 1 % (ref 0–0.5)
LYMPHOCYTES # BLD: 2.1 K/UL (ref 0.8–3.5)
LYMPHOCYTES NFR BLD: 15 % (ref 12–49)
MCH RBC QN AUTO: 30 PG (ref 26–34)
MCHC RBC AUTO-ENTMCNC: 33.3 G/DL (ref 30–36.5)
MCV RBC AUTO: 90.1 FL (ref 80–99)
MONOCYTES # BLD: 0.9 K/UL (ref 0–1)
MONOCYTES NFR BLD: 6 % (ref 5–13)
NEUTS SEG # BLD: 11.1 K/UL (ref 1.8–8)
NEUTS SEG NFR BLD: 77 % (ref 32–75)
NRBC # BLD: 0 K/UL (ref 0–0.01)
NRBC BLD-RTO: 0 PER 100 WBC
PLATELET # BLD AUTO: 231 K/UL (ref 150–400)
PMV BLD AUTO: 11.2 FL (ref 8.9–12.9)
POTASSIUM SERPL-SCNC: 4.6 MMOL/L (ref 3.5–5.1)
PROT SERPL-MCNC: 6.5 G/DL (ref 6.4–8.2)
PROT UR-MCNC: 6 MG/DL (ref 0–11.9)
PROT/CREAT UR-RTO: 0.3
RBC # BLD AUTO: 4.16 M/UL (ref 3.8–5.2)
SODIUM SERPL-SCNC: 140 MMOL/L (ref 136–145)
WBC # BLD AUTO: 14.5 K/UL (ref 3.6–11)

## 2018-10-16 PROCEDURE — 99218 HC RM OBSERVATION: CPT

## 2018-10-16 PROCEDURE — 84156 ASSAY OF PROTEIN URINE: CPT | Performed by: OBSTETRICS & GYNECOLOGY

## 2018-10-16 PROCEDURE — 36415 COLL VENOUS BLD VENIPUNCTURE: CPT | Performed by: OBSTETRICS & GYNECOLOGY

## 2018-10-16 PROCEDURE — 74011250636 HC RX REV CODE- 250/636: Performed by: OBSTETRICS & GYNECOLOGY

## 2018-10-16 PROCEDURE — 82570 ASSAY OF URINE CREATININE: CPT | Performed by: OBSTETRICS & GYNECOLOGY

## 2018-10-16 PROCEDURE — 85025 COMPLETE CBC W/AUTO DIFF WBC: CPT | Performed by: OBSTETRICS & GYNECOLOGY

## 2018-10-16 PROCEDURE — 80053 COMPREHEN METABOLIC PANEL: CPT | Performed by: OBSTETRICS & GYNECOLOGY

## 2018-10-16 RX ORDER — ACETAMINOPHEN 325 MG/1
325 TABLET ORAL
Status: DISCONTINUED | OUTPATIENT
Start: 2018-10-16 | End: 2018-10-17 | Stop reason: HOSPADM

## 2018-10-16 RX ORDER — DIPHENHYDRAMINE HCL 25 MG
50 CAPSULE ORAL
Status: DISCONTINUED | OUTPATIENT
Start: 2018-10-16 | End: 2018-10-17 | Stop reason: HOSPADM

## 2018-10-16 RX ORDER — GUAIFENESIN 100 MG/5ML
81 LIQUID (ML) ORAL DAILY
Status: DISCONTINUED | OUTPATIENT
Start: 2018-10-17 | End: 2018-10-17 | Stop reason: HOSPADM

## 2018-10-16 RX ORDER — SODIUM CHLORIDE 0.9 % (FLUSH) 0.9 %
5-10 SYRINGE (ML) INJECTION EVERY 8 HOURS
Status: DISCONTINUED | OUTPATIENT
Start: 2018-10-16 | End: 2018-10-17 | Stop reason: HOSPADM

## 2018-10-16 RX ORDER — SODIUM CHLORIDE 0.9 % (FLUSH) 0.9 %
5-10 SYRINGE (ML) INJECTION AS NEEDED
Status: DISCONTINUED | OUTPATIENT
Start: 2018-10-16 | End: 2018-10-17 | Stop reason: HOSPADM

## 2018-10-16 RX ADMIN — SODIUM CHLORIDE, SODIUM LACTATE, POTASSIUM CHLORIDE, AND CALCIUM CHLORIDE 500 ML: 600; 310; 30; 20 INJECTION, SOLUTION INTRAVENOUS at 10:58

## 2018-10-16 NOTE — H&P
History & Physical 
 
Name: [facility] MRN: [id number]  SSN: [id number] YOB: 1985  Age: 28 y.o. Sex: female Subjective:  
 
Estimated Date of Delivery: 18 OB History  Para Term  AB Living 2 1 1 0 0 1 SAB TAB Ectopic Molar Multiple Live Births  
 0 0 0  0 1 Ms. Maksim Gudino is admitted with pregnancy at 34w1d for elevated BP. Prenatal course was complicated by chtn, no meds. Please see prenatal records for details. Past Medical History:  
Diagnosis Date  Essential hypertension  Family history of Down syndrome 3/27/2014  HX OTHER MEDICAL   
 gardasil X3  
 Pap smear for cervical cancer screening 1/15/13; 2016  
 negative; negative, HPV negative No past surgical history on file. Social History Occupational History  Not on file. Social History Main Topics  Smoking status: Never Smoker  Smokeless tobacco: Never Used  Alcohol use No  
 Drug use: No  
 Sexual activity: Yes  
  Partners: Male Birth control/ protection: Condom Comment: depo Family History Problem Relation Age of Onset  Breast Cancer Paternal Grandmother  Diabetes Maternal Grandfather  Hypertension Mother  Diabetes Other   
  uncle  No Known Problems Father Allergies Allergen Reactions  Cefzil [Cefprozil] Hives Tolerated z-shari, amoxicillin Prior to Admission medications Medication Sig Start Date End Date Taking? Authorizing Provider Cetirizine (ZYRTEC) 10 mg cap Take  by mouth. Historical Provider CALCIUM PO Take  by mouth. Historical Provider PRENATAL VITS W-CA,FE,FA,<1MG, (PRENATAL VITAMIN PO) Take  by mouth. Historical Provider Active Hospital Problems Diagnosis Date Noted  Hypertension affecting pregnancy, antepartum 10/16/2018 Review of Systems: A comprehensive review of systems was negative except for that written in the HPI. Constitutional: negative for fevers, chills and weight loss ENT ROS: negative for - hearing change, oral lesions or visual changes Respiratory: negative for cough, wheezing or dyspnea on exertion Cardiovascular: negative for chest pain, irregular heart beats, exertional chest pressure/discomfort Gastrointestinal: negative for dysphagia, nausea and vomiting Genito-Urinary ROS: see HPI Inteument/breast: negative for rash, breast lump and nipple discharge Musculoskeletal:negative for stiff joints, neck pain and muscle weakness Endocrine ROS: negative for - breast changes, galactorrhea or temperature intolerance Hematological and Lymphatic ROS: negative for - bruising or swollen lymph nodes Objective:  
 
Vitals: 
Vitals:  
 10/16/18 1209 10/16/18 1236 10/16/18 1500 10/16/18 1607 BP: (!) 143/91 150/80 151/90 146/89 Pulse: (!) 107 83 Resp:      
Temp:      
SpO2:      
  
 
 
Physical Exam: 
Patient without distress. Heart: Regular rate and rhythm or S1S2 present Lung: clear to auscultation throughout lung fields, no wheezes, no rales, no rhonchi and normal respiratory effort Abdomen: soft, nontender Fundus: soft and non tender Cervical Exam: not checked Lower Extremities: no c/t/ trace ankle edema Membranes:  inact Prenatal Labs:  
Lab Results Component Value Date/Time  
 Rubella, External Non Immune 04/26/2018 Rubella, External Immune 04/26/2018 GrBStrep, External negative 10/15/2014 HBsAg, External Negative 04/26/2018 HIV, External Non Reactive 04/26/2018 Gonorrhea, External Negative 04/26/2018 Chlamydia, External Negative 04/26/2018 Recent Results (from the past 12 hour(s)) METABOLIC PANEL, COMPREHENSIVE Collection Time: 10/16/18 10:51 AM  
Result Value Ref Range Sodium 140 136 - 145 mmol/L Potassium 4.6 3.5 - 5.1 mmol/L Chloride 103 97 - 108 mmol/L  
 CO2 24 21 - 32 mmol/L Anion gap 13 5 - 15 mmol/L Glucose 83 65 - 100 mg/dL BUN 6 6 - 20 MG/DL Creatinine 0.30 (L) 0.55 - 1.02 MG/DL  
 BUN/Creatinine ratio 20 12 - 20 GFR est AA >60 >60 ml/min/1.73m2 GFR est non-AA >60 >60 ml/min/1.73m2 Calcium 9.6 8.5 - 10.1 MG/DL Bilirubin, total 0.5 0.2 - 1.0 MG/DL  
 ALT (SGPT) 25 12 - 78 U/L  
 AST (SGOT) 39 (H) 15 - 37 U/L Alk. phosphatase 109 45 - 117 U/L Protein, total 6.5 6.4 - 8.2 g/dL Albumin 2.7 (L) 3.5 - 5.0 g/dL Globulin 3.8 2.0 - 4.0 g/dL A-G Ratio 0.7 (L) 1.1 - 2.2    
CBC WITH AUTOMATED DIFF Collection Time: 10/16/18 10:51 AM  
Result Value Ref Range WBC 14.5 (H) 3.6 - 11.0 K/uL  
 RBC 4.16 3.80 - 5.20 M/uL  
 HGB 12.5 11.5 - 16.0 g/dL HCT 37.5 35.0 - 47.0 % MCV 90.1 80.0 - 99.0 FL  
 MCH 30.0 26.0 - 34.0 PG  
 MCHC 33.3 30.0 - 36.5 g/dL  
 RDW 13.0 11.5 - 14.5 % PLATELET 494 188 - 167 K/uL MPV 11.2 8.9 - 12.9 FL  
 NRBC 0.0 0  WBC ABSOLUTE NRBC 0.00 0.00 - 0.01 K/uL NEUTROPHILS 77 (H) 32 - 75 % LYMPHOCYTES 15 12 - 49 % MONOCYTES 6 5 - 13 % EOSINOPHILS 1 0 - 7 % BASOPHILS 1 0 - 1 % IMMATURE GRANULOCYTES 1 (H) 0.0 - 0.5 % ABS. NEUTROPHILS 11.1 (H) 1.8 - 8.0 K/UL  
 ABS. LYMPHOCYTES 2.1 0.8 - 3.5 K/UL  
 ABS. MONOCYTES 0.9 0.0 - 1.0 K/UL  
 ABS. EOSINOPHILS 0.1 0.0 - 0.4 K/UL  
 ABS. BASOPHILS 0.1 0.0 - 0.1 K/UL  
 ABS. IMM. GRANS. 0.2 (H) 0.00 - 0.04 K/UL  
 DF AUTOMATED PROTEIN/CREATININE RATIO, URINE Collection Time: 10/16/18 11:36 AM  
Result Value Ref Range Protein, urine random 6 0.0 - 11.9 mg/dL Creatinine, urine 23.47 mg/dL Protein/Creat. urine Ratio 0.3 Assessment/Plan:  
28 y.o.  34w1d CHTN, suspect sipih 
elev LFT/AST 
GBS Negative Reassuring fetal surveillance Plan: Admit for pih labs, 24 hr urine, MFM consult. CEFM/TOCO 
D/w pt will keep overnight and repeat labs Signed By:  Kamlesh Persaud MD   
 2018

## 2018-10-16 NOTE — PATIENT INSTRUCTIONS

## 2018-10-16 NOTE — PROGRESS NOTES
_ 164 [facility]  [email or web address]  [phone]    Chelsi Hercules MD, FACOG     Follow-up OB visit    Chief Complaint   Patient presents with    Routine Prenatal Visit       Vitals:    10/16/18 0950 10/16/18 1020   BP: (!) 144/92 (!) 158/100   Weight: 222 lb (100.7 kg)    Height: 5' 7\" (1.702 m)        Patient Active Problem List    Diagnosis Date Noted    Prenatal care of multigravida, antepartum 2018     Priority: 1 - One    Pre-existing essential hypertension during pregnancy, antepartum 2018    Hypertension during pregnancy, antepartum 2018    Hyperprolactinemia ([address] 2016    HTN (hypertension), benign 2015        The patient reports the following concerns: none. [address] [place] [address] sched for Thursday. BP elevated today. Urine- trace protein    See PN flowsheet for exam    28 y.o.  34w1d   Encounter Diagnoses   Name Primary?  Pre-existing essential hypertension during pregnancy, antepartum     Prenatal care of multigravida, antepartum Yes     PIH precautions  To L and D for labs/nst     [] SAB/bleeding precautions reviewed   [] PTL/PPROM precautions reviewed   [] Labor precautions reviewed   [] Fetal kick counts discussed   [] Labs reviewed with patient   [] Mammie Mia precautions reviewed   [] Consent reviewed   [] Handouts given to pt   [] Glucola handout    [] GBS/labor/Magic Hour handout   []    [] We reviewed CDC recommendations for Tdap for patient and close contacts and RBA of receiving in pregnancy, advised obtaining in third trimester   [] Reviewed healthy nutrition in pregnancy and good exercise practices   [] We disc safer medications in pregnancy and referred patient to [facility] MATT resources   [] We reviewed CDC recommendations for flu vaccine and RBA of receiving in pregnancy   []    []    []         Follow-up Disposition:  Return in about 2 weeks (around 10/30/2018) for Follow up OB visit.     No orders of the defined types were placed in this encounter.       Ian Valverde MD

## 2018-10-16 NOTE — PROGRESS NOTES
NST Inpatient Procedure Note Shannan Rivers presents for fetal non-stress test.   
Indication is elevated BP in pregnancy. She is 34w1d. She has been monitored for more than 30 minutes. The FHR was reactive. NST Interpretation: FHR baseline 140 bpm,  
Variability moderate Accelerations present. Decelerations Absent. Uterine contractions were not present Assessment NST is reactive. NST is reassuring. Patient does need admission/observation for further monitoring. Nancy Salinas was informed of the NST results and her questions were answered. Plan:  
 [] Continue admission to labor and delivery  
 [] Continue observation 
 [] Keep routine OB follow up upon discharge 
 [] Reviewed fetal movement kick counts, notify MD if decreased 
 []  
 [x] nst bid Repeat labs in am 
Sign out given to obhg Dr. Karen Serra

## 2018-10-17 VITALS
TEMPERATURE: 98.2 F | RESPIRATION RATE: 14 BRPM | DIASTOLIC BLOOD PRESSURE: 89 MMHG | SYSTOLIC BLOOD PRESSURE: 131 MMHG | OXYGEN SATURATION: 98 % | HEART RATE: 127 BPM

## 2018-10-17 LAB
ALBUMIN SERPL-MCNC: 2.5 G/DL (ref 3.5–5)
ALBUMIN/GLOB SERPL: 0.7 {RATIO} (ref 1.1–2.2)
ALP SERPL-CCNC: 106 U/L (ref 45–117)
ALT SERPL-CCNC: 22 U/L (ref 12–78)
ANION GAP SERPL CALC-SCNC: 8 MMOL/L (ref 5–15)
AST SERPL-CCNC: 15 U/L (ref 15–37)
BILIRUB SERPL-MCNC: 0.3 MG/DL (ref 0.2–1)
BUN SERPL-MCNC: 5 MG/DL (ref 6–20)
BUN/CREAT SERPL: 8 (ref 12–20)
CALCIUM SERPL-MCNC: 8.2 MG/DL (ref 8.5–10.1)
CHLORIDE SERPL-SCNC: 105 MMOL/L (ref 97–108)
CO2 SERPL-SCNC: 26 MMOL/L (ref 21–32)
COLLECT DURATION TIME UR: 24 HR
COLLECT DURATION TIME UR: 24 HR
CREAT 24H UR-MRATE: 1984 MG/24HR (ref 600–1800)
CREAT SERPL-MCNC: 0.6 MG/DL (ref 0.55–1.02)
ERYTHROCYTE [DISTWIDTH] IN BLOOD BY AUTOMATED COUNT: 13.2 % (ref 11.5–14.5)
GLOBULIN SER CALC-MCNC: 3.4 G/DL (ref 2–4)
GLUCOSE SERPL-MCNC: 93 MG/DL (ref 65–100)
HCT VFR BLD AUTO: 37.7 % (ref 35–47)
HGB BLD-MCNC: 12.4 G/DL (ref 11.5–16)
MCH RBC QN AUTO: 30.4 PG (ref 26–34)
MCHC RBC AUTO-ENTMCNC: 32.9 G/DL (ref 30–36.5)
MCV RBC AUTO: 92.4 FL (ref 80–99)
NRBC # BLD: 0 K/UL (ref 0–0.01)
NRBC BLD-RTO: 0 PER 100 WBC
PLATELET # BLD AUTO: 223 K/UL (ref 150–400)
PMV BLD AUTO: 10.7 FL (ref 8.9–12.9)
POTASSIUM SERPL-SCNC: 3.7 MMOL/L (ref 3.5–5.1)
PROT 24H UR-MRATE: NORMAL MG/24HR
PROT SERPL-MCNC: 5.9 G/DL (ref 6.4–8.2)
RBC # BLD AUTO: 4.08 M/UL (ref 3.8–5.2)
SODIUM SERPL-SCNC: 139 MMOL/L (ref 136–145)
SPECIMEN VOL ?TM UR: 8000 ML
SPECIMEN VOL ?TM UR: 8000 ML
WBC # BLD AUTO: 14.4 K/UL (ref 3.6–11)

## 2018-10-17 PROCEDURE — 85027 COMPLETE CBC AUTOMATED: CPT | Performed by: OBSTETRICS & GYNECOLOGY

## 2018-10-17 PROCEDURE — 74011250637 HC RX REV CODE- 250/637: Performed by: OBSTETRICS & GYNECOLOGY

## 2018-10-17 PROCEDURE — 99218 HC RM OBSERVATION: CPT

## 2018-10-17 PROCEDURE — 80053 COMPREHEN METABOLIC PANEL: CPT | Performed by: OBSTETRICS & GYNECOLOGY

## 2018-10-17 PROCEDURE — 36415 COLL VENOUS BLD VENIPUNCTURE: CPT | Performed by: OBSTETRICS & GYNECOLOGY

## 2018-10-17 PROCEDURE — 59025 FETAL NON-STRESS TEST: CPT

## 2018-10-17 RX ADMIN — Medication 10 ML: at 06:58

## 2018-10-17 RX ADMIN — ASPIRIN 81 MG 81 MG: 81 TABLET ORAL at 10:27

## 2018-10-17 NOTE — PROGRESS NOTES
10/17/18 11:53 AM 
CM met with patient to discuss obs status. Letter provided, questions answered. Plan for patient to be discharged home today with follow up in office in 1 week. Patient was seen here for pre-E eval, as she is 34w2d. Denied any additional needs at this time.  
ARMANDO Bell

## 2018-10-17 NOTE — PROGRESS NOTES
3283 - Assumed care of patient at this time. Patient resting in bed. Denies any needs at this time, BP set to take in 5 mins (previous RN stated BP readings are higher with RN in room). 0730 - Patient sitting up eating breakfast.  
 
0750 - Placed on EFM for AM NST. 4075 - Reactive NST observed. VSS. Patient up to shower at this time, linens changed. 56 - Dr. Urbano Home at bedside discussing plan of care. Claudia Mckee from Dr. Urbano Home to discharge patient home after 24 hour urine collection is completed. MD reviewed FHR tracing, labs and BP readings. 1132 - 24 hour urine completed, labeled and walked down to lab by this RN.  
 
1150 - I have reviewed discharge instructions with the patient including follow-up appointments, hypertension in pregnancy care instructions, pregnancy precautions, week 32-34 pregnancy and kick counts. The patient verbalized understanding and had no additional questions. PIV removed. Patient ambulated off unit at this time.

## 2018-10-17 NOTE — DISCHARGE INSTRUCTIONS
High Blood Pressure in Pregnancy: Care Instructions  Your Care Instructions  High blood pressure (hypertension) means that the force of blood against your artery walls is too strong. Mild high blood pressure during pregnancy is not usually dangerous. Your doctor will probably just want to watch you closely. But when blood pressure is very high, it can reduce oxygen to your baby. This can affect how well your baby grows. High blood pressure also means that you are at higher risk for:  · Preeclampsia. This is a problem that includes high blood pressure and damage to your liver or kidneys. It can also reduce how much oxygen your baby gets. In some cases, it leads to eclampsia. Eclampsia causes seizures. · Placental abruption. This is a problem when the placenta separates from the uterus before birth. It prevents the baby from getting enough oxygen and nutrients. Sometimes it can cause death for the baby and the mother. To prevent problems for you or your baby, you will have to check your blood pressure often. You will do this until after your baby is born. If your blood pressure rises suddenly or is very high during your pregnancy, your doctor may prescribe medicines. They can usually control blood pressure. If your blood pressure affects your or your baby's health, your doctor may need to deliver your baby early. After your baby is born, your blood pressure will probably improve. But sometimes blood pressure problems continue after birth. Follow-up care is a key part of your treatment and safety. Be sure to make and go to all appointments, and call your doctor if you are having problems. It's also a good idea to know your test results and keep a list of the medicines you take. How can you care for yourself at home? · Take and write down your blood pressure at home if your doctor tells you to. · Take your medicines exactly as prescribed.  Call your doctor if you think you are having a problem with your medicine. · Do not smoke. If you need help quitting, talk to your doctor about stop-smoking programs and medicines. These can increase your chances of quitting for good. · Do not gain too much weight during your pregnancy. Talk to your doctor about how much weight gain is healthy. · Eat a healthy diet. · If your doctor says it's okay, get regular exercise. Walking or swimming several times a week can be healthy for you and your baby. · Reduce stress, and find time to relax. This is very important if you continue to work or have a busy schedule. It's also important if you have small children at home. When should you call for help? Call 911 anytime you think you may need emergency care. For example, call if:    · You passed out (lost consciousness).     · You have a seizure.    Call your doctor now or seek immediate medical care if:    · You have symptoms of preeclampsia, such as:  ? Sudden swelling of your face, hands, or feet. ? New vision problems (such as dimness or blurring). ? A severe headache.     · Your blood pressure is higher than it should be or rises suddenly.     · You have new nausea or vomiting.     · You think that you are in labor.     · You have pain in your belly or pelvis.    Watch closely for changes in your health, and be sure to contact your doctor if:    · You gain weight rapidly. Where can you learn more? Go to http://shikha-aleksandar.info/. Enter [phone] in the search box to learn more about \"High Blood Pressure in Pregnancy: Care Instructions. \"  Current as of: November 21, 2017  Content Version: 11.8  © 7439-9637 [employer]. Care instructions adapted under license by [employer] (which disclaims liability or warranty for this information).  If you have questions about a medical condition or this instruction, always ask your healthcare professional. [address] any warranty or liability for your use of this information. Pregnancy Precautions: Care Instructions  Your Care Instructions  There is no sure way to prevent labor before your due date ( labor) or to prevent most other pregnancy problems. But there are things you can do to increase your chances of a healthy pregnancy. Go to your appointments, follow your doctor's advice, and take good care of yourself. Eat well, and exercise (if your doctor agrees). And make sure to drink plenty of water. Follow-up care is a key part of your treatment and safety. Be sure to make and go to all appointments, and call your doctor if you are having problems. It's also a good idea to know your test results and keep a list of the medicines you take. How can you care for yourself at home? · Make sure you go to your prenatal appointments. At each visit, your doctor will check your blood pressure. Your doctor will also check to see if you have protein in your urine. High blood pressure and protein in urine are signs of preeclampsia. This condition can be dangerous for you and your baby. · Drink plenty of fluids, enough so that your urine is light yellow or clear like water. Dehydration can cause contractions. If you have kidney, heart, or liver disease and have to limit fluids, talk with your doctor before you increase the amount of fluids you drink. · Tell your doctor right away if you notice any symptoms of an infection, such as:  ? Burning when you urinate. ? A foul-smelling discharge from your vagina. ? Vaginal itching. ? Unexplained fever. ? Unusual pain or soreness in your uterus or lower belly. · Eat a balanced diet. Include plenty of foods that are high in calcium and iron. ? Foods high in calcium include milk, cheese, yogurt, almonds, and broccoli. ? Foods high in iron include red meat, shellfish, poultry, eggs, beans, raisins, whole-grain bread, and leafy green vegetables. · Do not smoke.  If you need help quitting, talk to your doctor about stop-smoking programs and medicines. These can increase your chances of quitting for good. · Do not drink alcohol or use illegal drugs. · Follow your doctor's directions about activity. Your doctor will let you know how much, if any, exercise you can do. · Ask your doctor if you can have sex. If you are at risk for early labor, your doctor may ask you to not have sex. · Take care to prevent falls. During pregnancy, your joints are loose, and your balance is off. Sports such as bicycling, skiing, or in-line skating can increase your risk of falling. And don't ride horses or motorcycles, dive, water ski, scuba dive, or parachute jump while you are pregnant. · Avoid getting very hot. Do not use saunas or hot tubs. Avoid staying out in the sun in hot weather for long periods. Take acetaminophen (Tylenol) to lower a high fever. · Do not take any over-the-counter or herbal medicines or supplements without talking to your doctor or pharmacist first.  When should you call for help? Call 911 anytime you think you may need emergency care. For example, call if:    · You passed out (lost consciousness).     · You have severe vaginal bleeding.     · You have severe pain in your belly or pelvis.     · You have had fluid gushing or leaking from your vagina and you know or think the umbilical cord is bulging into your vagina. If this happens, immediately get down on your knees so your rear end (buttocks) is higher than your head. This will decrease the pressure on the cord until help arrives.   [facility] your doctor now or seek immediate medical care if:    · You have signs of preeclampsia, such as:  ? Sudden swelling of your face, hands, or feet. ? New vision problems (such as dimness or blurring). ? A severe headache.     · You have any vaginal bleeding.     · You have belly pain or cramping.     · You have a fever.     · You have had regular contractions (with or without pain) for an hour.  This means that you have 8 or more within 1 hour or 4 or more in 20 minutes after you change your position and drink fluids.     · You have a sudden release of fluid from your vagina.     · You have low back pain or pelvic pressure that does not go away.     · You notice that your baby has stopped moving or is moving much less than normal.    Watch closely for changes in your health, and be sure to contact your doctor if you have any problems. Where can you learn more? Go to http://shikha-aleksandar.info/. Enter [id number] in the search box to learn more about \"Pregnancy Precautions: Care Instructions. \"  Current as of: November 21, 2017  Content Version: 11.8  © 6211-0032 [employer]. Care instructions adapted under license by [employer] (which disclaims liability or warranty for this information). If you have questions about a medical condition or this instruction, always ask your healthcare professional. [address] any warranty or liability for your use of this information. Weeks 32 to 34 of Your Pregnancy: Care Instructions  Your Care Instructions    During the last few weeks of your pregnancy, you may have more aches and pains. It's important to rest when you can. Your growing baby is putting more pressure on your bladder. So you may need to urinate more often. Hemorrhoids are also common. These are painful, itchy veins in the rectal area. In the 36th week, most women have a test for group B streptococcus (GBS). GBS is a common bacteria that can live in the vagina and rectum. It can make your baby sick after birth. If you test positive, you will get antibiotics during labor. These will keep your baby from getting the bacteria. You may want to talk with your doctor about banking your baby's umbilical cord blood. This is the blood left in the cord after birth. If you want to save this blood, you must arrange it ahead of time. You can't decide at the last minute.   If you haven't already had the Tdap shot during this pregnancy, talk to your doctor about getting it. It will help protect your  against pertussis infection. Follow-up care is a key part of your treatment and safety. Be sure to make and go to all appointments, and call your doctor if you are having problems. It's also a good idea to know your test results and keep a list of the medicines you take. How can you care for yourself at home? Ease hemorrhoids  · Get more liquids, fruits, vegetables, and fiber in your diet. This will help keep your stools soft. · Avoid sitting for too long. Lie on your left side several times a day. · Clean yourself with soft, moist toilet paper. Or you can use witch hazel pads or personal hygiene pads. · If you are uncomfortable, try ice packs. Or you can sit in a warm sitz bath. Do these for 20 minutes at a time, as needed. · Use hydrocortisone cream for pain and itching. Two examples are Anusol and Preparation H Hydrocortisone. · Ask your doctor about taking an over-the-counter stool softener. Consider breastfeeding  · Experts recommend that women breastfeed for 1 year or longer. Breast milk is the perfect food for babies. · Breast milk is easier for babies to digest than formula. And it is always available, just the right temperature, and free. · Breast milk may help protect your child from some health problems.  babies are less likely than formula-fed babies to:  ? Get ear infections, colds, diarrhea, and pneumonia. ? Be obese or get diabetes later in life. · Women who breastfeed have less bleeding after the birth. Their uteruses also shrink back faster. · Some women who breastfeed lose weight faster. Making milk burns calories. · Breastfeeding can lower your risk of breast cancer, ovarian cancer, and osteoporosis. Decide about circumcision for boys  · As you make this decision, it may help to think about your personal, Methodist, and family traditions.  You get to decide if you will keep your son's penis natural or if he will be circumcised. · If you decide that you would like to have your baby circumcised, talk with your doctor. You can share your concerns about pain. And you can discuss your preferences for anesthesia. Where can you learn more? Go to http://shikha-aleksandar.info/. Enter T372 in the search box to learn more about \"Weeks 32 to 34 of Your Pregnancy: Care Instructions. \"  Current as of: November 21, 2017  Content Version: 11.8  © 1914-3961 [employer]. Care instructions adapted under license by [employer] (which disclaims liability or warranty for this information). If you have questions about a medical condition or this instruction, always ask your healthcare professional. [address] any warranty or liability for your use of this information. Counting Your Baby's Kicks: Care Instructions  Your Care Instructions  Counting your baby's kicks is one way your doctor can tell that your baby is healthy. Most women--especially in a first pregnancy--feel their baby move for the first time between 16 and 22 weeks. The movement may feel like flutters rather than kicks. Your baby may move more at certain times of the day. When you are active, you may notice less kicking than when you are resting. At your prenatal visits, your doctor will ask whether the baby is active. In your last trimester, your doctor may ask you to count the number of times you feel your baby move. Follow-up care is a key part of your treatment and safety. Be sure to make and go to all appointments, and call your doctor if you are having problems. It's also a good idea to know your test results and keep a list of the medicines you take. How do you count fetal kicks? · A common method of checking your baby's movement is to count the number of kicks or moves you feel in 1 hour.  Ten movements (such as kicks, flutters, or rolls) in 1 hour are normal. Some doctors suggest that you count in the morning until you get to 10 movements. Then you can quit for that day and start again the next day. · Pick your baby's most active time of day to count. This may be any time from morning to evening. · If you do not feel 10 movements in an hour, your baby may be sleeping. Wait for the next hour and count again. When should you call for help? Call your doctor now or seek immediate medical care if:    · You noticed that your baby has stopped moving or is moving much less than normal.    Watch closely for changes in your health, and be sure to contact your doctor if you have any problems. Where can you learn more? Go to http://shikha-aleksandar.info/. Enter X310 in the search box to learn more about \"Counting Your Baby's Kicks: Care Instructions. \"  Current as of: November 21, 2017  Content Version: 11.8  © 3405-9509 [employer]. Care instructions adapted under license by [employer] (which disclaims liability or warranty for this information). If you have questions about a medical condition or this instruction, always ask your healthcare professional. [address] any warranty or liability for your use of this information.

## 2018-10-17 NOTE — PROGRESS NOTES
1905: Bedside SBAR report received from FLOYD Bledsoe RN, assumed care of patient at this time. 1935: patient sitting up in bed eating dinner and talking with friend, RN will return in 20 minutes for monitiring. 2005: patient resting in bed watching tv, placed on monitoring for NST at this time. 2050: RN at bedside adjusting EFM, patient continues resting in bed. 
2120: patient NST reactive, patient taken off monitoring. 2200: RN at bedside, patient resting in bed, ice for 24 h urine refilled - patient instructed to call out to RN if ice is all melted. Patient educated about hourly rounding, patient declines hourly rounds, will round every two hours per request - patient verbalized understanding. Patient also notified of lab draw pending in the morning, instructed to call out when up for RN to come in and draw labs at that time. 2350: patient sleeping, no signs of distress present, will continue to monitor. 0135: patient sleeping, no signs of distress present, will continue to monitor. 0300: patient sleeping, no signs of distress present, will continue to monitor. 0455: patient sleeping, no signs of distress present, will continue to monitor. 0530: Patient up to restroom, called out for lab draw - Hallie Mantilla RN at bedside for labs. 0000: patient resting in bed, no needs stated at this time, will continue to monitor. 0258: Bedside SBAR report given to Roopa Douglas RN, transfer of patient care complete at this time.

## 2018-10-17 NOTE — PROGRESS NOTES
NST Inpatient Procedure Note Eve Nielsen presents for fetal non-stress test.   
Indication is SIPIH, elevated bp. She is 34w2d. She has been monitored for more than 30 minutes. The FHR was reactive. NST Interpretation: FHR baseline 150 bpm,  
Variability moderate Accelerations present. Decelerations Absent. Uterine contractions were not present Assessment NST is reactive. NST is reassuring. Patient does need admission/observation for further monitoring. To complete 24 hr urine Alber Gonzalez was informed of the NST results and her questions were answered. Plan:  
 [] Continue admission to labor and delivery  
 [] Continue observation 
 [] Keep routine OB follow up upon discharge 
 [] Reviewed fetal movement kick counts, notify MD if decreased 
 []  
 []

## 2018-10-17 NOTE — PROGRESS NOTES
Ante Partum Progress Note Wynema Riedel 34w2d Patient states she does not have headache , abdominal pain  , contractions, right upper quadrant pain  , vaginal bleeding  and vaginal leaking of fluid Vitals: 
Visit Vitals /90 Pulse (!) 113 Temp 98.1 °F (36.7 °C) Resp 14 LMP 2018 (Exact Date) SpO2 98% Temp (24hrs), Av.2 °F (36.8 °C), Min:98.1 °F (36.7 °C), Max:98.3 °F (36.8 °C) Patient Vitals for the past 12 hrs: 
 Temp Pulse Resp BP  
10/17/18 0655 98.1 °F (36.7 °C) (!) 113 14 133/90  
10/17/18 0600  (!) 121  131/78  
10/17/18 0539 98.3 °F (36.8 °C) (!) 107 16 (!) 134/92 Last 24hr Input/Output: 
No intake or output data in the 24 hours ending 10/17/18 0758 Exam:   
General: Patient without distress. Chest 
· Respiratory Effort: breathing normal 
      Auscultation: normal breath sounds, clear bilaterally Cardiovascular · Heart: 
· Auscultation: regular rate and rhythm without murmur, normal S1, S2 Abdomen: soft, non-tender Fundus: soft, gravid, non-tender Extremities: no redness or tenderness or cords Labs:  
Lab Results Component Value Date/Time  WBC 14.4 (H) 10/17/2018 05:29 AM  
 WBC 14.5 (H) 10/16/2018 10:51 AM  
 WBC 14.1 (H) 2018 10:38 AM  
 WBC 10.2 2018 02:06 PM  
 WBC 6.1 2016 10:29 AM  
 WBC 14.3 (H) 10/30/2014 02:57 AM  
 WBC 12.2 (H) 10/29/2014 03:55 AM  
 WBC 14.1 (H) 2014 11:02 AM  
 WBC 10.0 2014 02:19 PM  
 HGB 12.4 10/17/2018 05:29 AM  
 HGB 12.5 10/16/2018 10:51 AM  
 HGB 11.9 2018 10:38 AM  
 HGB 13.6 2018 02:06 PM  
 HGB 14.2 2016 10:29 AM  
 HGB 11.1 (L) 10/30/2014 02:57 AM  
 HGB 12.4 10/29/2014 03:55 AM  
 HGB 12.6 2014 11:02 AM  
 HGB 13.0 2014 02:19 PM  
 HCT 37.7 10/17/2018 05:29 AM  
 HCT 37.5 10/16/2018 10:51 AM  
 HCT 36.4 2018 10:38 AM  
 HCT 40.0 2018 02:06 PM  
 HCT 43.3 2016 10:29 AM  
 HCT 32.5 (L) 10/30/2014 02:57 AM  
 HCT 35.9 10/29/2014 03:55 AM  
 HCT 37.6 08/26/2014 11:02 AM  
 HCT 38.2 03/27/2014 02:19 PM  
 PLATELET 832 65/45/2140 05:29 AM  
 PLATELET 459 65/51/4717 10:51 AM  
 PLATELET 774 23/28/2296 10:38 AM  
 PLATELET 707 07/62/1901 02:06 PM  
 PLATELET 185 28/74/0011 10:29 AM  
 PLATELET 409 94/77/8864 02:57 AM  
 PLATELET 497 33/31/5218 03:55 AM  
 PLATELET 756 65/97/8477 11:02 AM  
 PLATELET 301 37/73/8721 02:19 PM  
 Hgb, External 11.9 09/05/2018 Hgb, External 13.6 04/26/2018 Hgb, External 13.0 03/27/2014 Hct, External 36.4 09/05/2018 Hct, External 40 04/26/2018 Hct, External 38.2 03/27/2014 Platelet cnt., External 239 09/05/2018 Platelet cnt., External 315 04/26/2018 Platelet cnt., External 317 03/27/2014 Recent Results (from the past 24 hour(s)) METABOLIC PANEL, COMPREHENSIVE Collection Time: 10/16/18 10:51 AM  
Result Value Ref Range Sodium 140 136 - 145 mmol/L Potassium 4.6 3.5 - 5.1 mmol/L Chloride 103 97 - 108 mmol/L  
 CO2 24 21 - 32 mmol/L Anion gap 13 5 - 15 mmol/L Glucose 83 65 - 100 mg/dL BUN 6 6 - 20 MG/DL Creatinine 0.30 (L) 0.55 - 1.02 MG/DL  
 BUN/Creatinine ratio 20 12 - 20 GFR est AA >60 >60 ml/min/1.73m2 GFR est non-AA >60 >60 ml/min/1.73m2 Calcium 9.6 8.5 - 10.1 MG/DL Bilirubin, total 0.5 0.2 - 1.0 MG/DL  
 ALT (SGPT) 25 12 - 78 U/L  
 AST (SGOT) 39 (H) 15 - 37 U/L Alk. phosphatase 109 45 - 117 U/L Protein, total 6.5 6.4 - 8.2 g/dL Albumin 2.7 (L) 3.5 - 5.0 g/dL Globulin 3.8 2.0 - 4.0 g/dL A-G Ratio 0.7 (L) 1.1 - 2.2    
CBC WITH AUTOMATED DIFF Collection Time: 10/16/18 10:51 AM  
Result Value Ref Range WBC 14.5 (H) 3.6 - 11.0 K/uL  
 RBC 4.16 3.80 - 5.20 M/uL  
 HGB 12.5 11.5 - 16.0 g/dL HCT 37.5 35.0 - 47.0 % MCV 90.1 80.0 - 99.0 FL  
 MCH 30.0 26.0 - 34.0 PG  
 MCHC 33.3 30.0 - 36.5 g/dL  
 RDW 13.0 11.5 - 14.5 % PLATELET 355 502 - 014 K/uL MPV 11.2 8.9 - 12.9 FL  
 NRBC 0.0 0  WBC ABSOLUTE NRBC 0.00 0.00 - 0.01 K/uL NEUTROPHILS 77 (H) 32 - 75 % LYMPHOCYTES 15 12 - 49 % MONOCYTES 6 5 - 13 % EOSINOPHILS 1 0 - 7 % BASOPHILS 1 0 - 1 % IMMATURE GRANULOCYTES 1 (H) 0.0 - 0.5 % ABS. NEUTROPHILS 11.1 (H) 1.8 - 8.0 K/UL  
 ABS. LYMPHOCYTES 2.1 0.8 - 3.5 K/UL  
 ABS. MONOCYTES 0.9 0.0 - 1.0 K/UL  
 ABS. EOSINOPHILS 0.1 0.0 - 0.4 K/UL  
 ABS. BASOPHILS 0.1 0.0 - 0.1 K/UL  
 ABS. IMM. GRANS. 0.2 (H) 0.00 - 0.04 K/UL  
 DF AUTOMATED PROTEIN/CREATININE RATIO, URINE Collection Time: 10/16/18 11:36 AM  
Result Value Ref Range Protein, urine random 6 0.0 - 11.9 mg/dL Creatinine, urine 23.47 mg/dL Protein/Creat. urine Ratio 0.3    
CBC W/O DIFF Collection Time: 10/17/18  5:29 AM  
Result Value Ref Range WBC 14.4 (H) 3.6 - 11.0 K/uL  
 RBC 4.08 3.80 - 5.20 M/uL  
 HGB 12.4 11.5 - 16.0 g/dL HCT 37.7 35.0 - 47.0 % MCV 92.4 80.0 - 99.0 FL  
 MCH 30.4 26.0 - 34.0 PG  
 MCHC 32.9 30.0 - 36.5 g/dL  
 RDW 13.2 11.5 - 14.5 % PLATELET 091 573 - 700 K/uL MPV 10.7 8.9 - 12.9 FL  
 NRBC 0.0 0  WBC ABSOLUTE NRBC 0.00 0.00 - 0.01 K/uL METABOLIC PANEL, COMPREHENSIVE Collection Time: 10/17/18  5:29 AM  
Result Value Ref Range Sodium 139 136 - 145 mmol/L Potassium 3.7 3.5 - 5.1 mmol/L Chloride 105 97 - 108 mmol/L  
 CO2 26 21 - 32 mmol/L Anion gap 8 5 - 15 mmol/L Glucose 93 65 - 100 mg/dL BUN 5 (L) 6 - 20 MG/DL Creatinine 0.60 0.55 - 1.02 MG/DL  
 BUN/Creatinine ratio 8 (L) 12 - 20 GFR est AA >60 >60 ml/min/1.73m2 GFR est non-AA >60 >60 ml/min/1.73m2 Calcium 8.2 (L) 8.5 - 10.1 MG/DL Bilirubin, total 0.3 0.2 - 1.0 MG/DL  
 ALT (SGPT) 22 12 - 78 U/L  
 AST (SGOT) 15 15 - 37 U/L Alk. phosphatase 106 45 - 117 U/L Protein, total 5.9 (L) 6.4 - 8.2 g/dL Albumin 2.5 (L) 3.5 - 5.0 g/dL Globulin 3.4 2.0 - 4.0 g/dL A-G Ratio 0.7 (L) 1.1 - 2.2 Assessment: 28 y.o.  34w2d [place], vs [address] [id number] Plan:   
Complete 24 hr urine MFM/BPP fu 1 wk FOB 1 wk 
pih precautions Keep BP log, notify MD if severe range, persistently elevated or inc PIH symptoms Jaylon Edwards MD

## 2018-10-17 NOTE — PROGRESS NOTES
Patient Vitals for the past 12 hrs: 
 Temp Pulse Resp BP  
10/17/18 0750  (!) 129  131/81  
10/17/18 0655 98.1 °F (36.7 °C) (!) 113 14 133/90  
10/17/18 0600  (!) 121  131/78  
10/17/18 0539 98.3 °F (36.8 °C) (!) 107 16 (!) 134/92

## 2018-10-20 NOTE — PROGRESS NOTES
The results are \"cannot be calculated\" Add to PL with date: 24 hr urine Please contact lab and see why it cannot be calculated. Please notify patient when we confirm why it can't be calculated with lab.

## 2018-10-23 ENCOUNTER — ROUTINE PRENATAL (OUTPATIENT)
Dept: OBGYN CLINIC | Age: 33
End: 2018-10-23

## 2018-10-23 ENCOUNTER — HOSPITAL ENCOUNTER (OUTPATIENT)
Dept: PERINATAL CARE | Age: 33
Discharge: HOME | End: 2018-10-23
Attending: OBSTETRICS & GYNECOLOGY
Payer: COMMERCIAL

## 2018-10-23 VITALS
WEIGHT: 220.5 LBS | HEIGHT: 67 IN | BODY MASS INDEX: 34.61 KG/M2 | DIASTOLIC BLOOD PRESSURE: 88 MMHG | SYSTOLIC BLOOD PRESSURE: 144 MMHG

## 2018-10-23 DIAGNOSIS — O16.9 ELEVATED BLOOD PRESSURE AFFECTING PREGNANCY, ANTEPARTUM: ICD-10-CM

## 2018-10-23 DIAGNOSIS — Z36.85 ANTENATAL SCREENING FOR STREPTOCOCCUS B: ICD-10-CM

## 2018-10-23 DIAGNOSIS — O10.019 PRE-EXISTING ESSENTIAL HYPERTENSION DURING PREGNANCY, ANTEPARTUM: ICD-10-CM

## 2018-10-23 DIAGNOSIS — Z34.80 PRENATAL CARE OF MULTIGRAVIDA, ANTEPARTUM: Primary | ICD-10-CM

## 2018-10-23 LAB — GRBS, EXTERNAL: POSITIVE

## 2018-10-23 PROCEDURE — 76818 FETAL BIOPHYS PROFILE W/NST: CPT | Performed by: OBSTETRICS & GYNECOLOGY

## 2018-10-23 NOTE — PATIENT INSTRUCTIONS
Weeks 34 to 36 of Your Pregnancy: Care Instructions  Your Care Instructions    By now, your baby and your belly have grown quite large. It is almost time to give birth. A full-term pregnancy can deliver between 37 and 42 weeks. Your baby's lungs are almost ready to breathe air. The bones in your baby's head are now firm enough to protect it, but soft enough to move down through the birth canal.  You may feel excited, happy, anxious, or scared. You may wonder how you will know if you are in labor or what to expect during labor. Try to be flexible in your expectations of the birth. Because each birth is different, there is no way to know exactly what childbirth will be like for you. This care sheet will help you know what to expect and how to prepare. This may make your childbirth easier. If you haven't already had the Tdap shot during this pregnancy, talk to your doctor about getting it. It will help protect your  against pertussis infection. In the 36th week, most women have a test for group B streptococcus (GBS). GBS is a common bacteria that can live in the vagina and rectum. It can make your baby sick after birth. If you test positive, you will get antibiotics during labor. The medicine will keep your baby from getting the bacteria. Follow-up care is a key part of your treatment and safety. Be sure to make and go to all appointments, and call your doctor if you are having problems. It's also a good idea to know your test results and keep a list of the medicines you take. How can you care for yourself at home? Learn about pain relief choices  · Pain is different for every woman. Talk with your doctor about your feelings about pain. · You can choose from several types of pain relief. These include medicine or breathing techniques, as well as comfort measures. You can use more than one option. · If you choose to have pain medicine during labor, talk to your doctor about your options.  Some medicines lower anxiety and help with some of the pain. Others make your lower body numb so that you won't feel pain. · Be sure to tell your doctor about your pain medicine choice before you start labor or very early in your labor. You may be able to change your mind as labor progresses. · Rarely, a woman is put to sleep by medicine given through a mask or an IV. Labor and delivery  · The first stage of labor has three parts: early, active, and transition. ? Most women have early labor at home. You can stay busy or rest, eat light snacks, drink clear fluids, and start counting contractions. ? When talking during a contraction gets hard, you may be moving to active labor. During active labor, you should head for the hospital if you are not there already. ? You are in active labor when contractions come every 3 to 4 minutes and last about 60 seconds. Your cervix is opening more rapidly. ? If your water breaks, contractions will come faster and stronger. ? During transition, your cervix is stretching, and contractions are coming more rapidly. ? You may want to push, but your cervix might not be ready. Your doctor will tell you when to push. · The second stage starts when your cervix is completely opened and you are ready to push. ? Contractions are very strong to push the baby down the birth canal.  ? You will feel the urge to push. You may feel like you need to have a bowel movement. ? You may be coached to push with contractions. These contractions will be very strong, but you will not have them as often. You can get a little rest between contractions. ? You may be emotional and irritable. You may not be aware of what is going on around you.  ? One last push, and your baby is born. · The third stage is when a few more contractions push out the placenta. This may take 30 minutes or less. · The fourth stage is the welcome recovery. You may feel overwhelmed with emotions and exhausted but alert.  This is a good time to start breastfeeding. Where can you learn more? Go to http://shikha-aleksandar.info/. Enter L285 in the search box to learn more about \"Weeks 34 to 36 of Your Pregnancy: Care Instructions. \"  Current as of: November 21, 2017  Content Version: 11.8  © 1752-3854 Healthwise, [employer]. Care instructions adapted under license by [employer] (which disclaims liability or warranty for this information). If you have questions about a medical condition or this instruction, always ask your healthcare professional. [address] any warranty or liability for your use of this information.

## 2018-10-23 NOTE — PROGRESS NOTES
_ 164 [facility]  [email or web address]  [phone]    Alicia Robles MD, FACOG     Follow-up OB visit    Chief Complaint   Patient presents with    Routine Prenatal Visit       Vitals:    10/23/18 1347 10/23/18 1415   BP: (!) 160/92 144/88   Weight: 220 lb 8 oz (100 kg)    Height: 5' 7\" (1.702 m)        Patient Active Problem List    Diagnosis Date Noted    Prenatal care of multigravida, antepartum 2018     Priority: 1 - One    Hypertension affecting pregnancy, antepartum 10/16/2018    Pre-existing essential hypertension during pregnancy, antepartum 2018    Hypertension during pregnancy, antepartum 2018    Hyperprolactinemia ([address] 2016    HTN (hypertension), benign 2015        The patient reports the following concerns: none  No ha/vision changes. Had spots 1 day, bp stable. Good FM    See PN flowsheet for exam    28 y.o. Mari Graff 35w1d   Encounter Diagnoses   Name Primary?  Prenatal care of multigravida, antepartum Yes    Pre-existing essential hypertension during pregnancy, antepartum      screening for streptococcus B     Elevated blood pressure affecting pregnancy, antepartum      Strict PIH prec  Repeat 24 hr urine  Pih labs  MFM fu today, called upstairs and requested BP repeat before disch  Strict PIH prec, pt feels anxiety is aggravating BP in office, will notify MD if home BP elevated. Plan IOL 37 wk, see MFM note. Disc r/b/a of induction and pitocin use and increase risk of longer labor,  section, bleeding and infection.         [] SAB/bleeding precautions reviewed   [] PTL/PPROM precautions reviewed   [] Labor precautions reviewed   [] Fetal kick counts discussed   [] Labs reviewed with patient   [] Evalee Ralphers precautions reviewed   [] Consent reviewed   [] Handouts given to pt   [] Glucola handout    [] GBS/labor/Magic Hour handout   []    [] We reviewed CDC recommendations for Tdap for patient and close contacts and RBA of receiving in pregnancy, advised obtaining in third trimester   [] Reviewed healthy nutrition in pregnancy and good exercise practices   [] We disc safer medications in pregnancy and referred patient to [facility] MATT resources   [] We reviewed CDC recommendations for flu vaccine and RBA of receiving in pregnancy   []    []    []         Follow-up Disposition:  Return in about 1 week (around 10/30/2018) for Follow up OB visit.     Orders Placed This Encounter    GROUP B STREP BY GLADYS    CBC W/O DIFF    METABOLIC PANEL, Perla Jeffers MD

## 2018-10-24 LAB
ALBUMIN SERPL-MCNC: 3.7 G/DL (ref 3.5–5.5)
ALBUMIN/GLOB SERPL: 1.7 {RATIO} (ref 1.2–2.2)
ALP SERPL-CCNC: 113 IU/L (ref 39–117)
ALT SERPL-CCNC: 11 IU/L (ref 0–32)
AST SERPL-CCNC: 13 IU/L (ref 0–40)
BILIRUB SERPL-MCNC: 0.2 MG/DL (ref 0–1.2)
BUN SERPL-MCNC: 6 MG/DL (ref 6–20)
BUN/CREAT SERPL: 11 (ref 9–23)
CALCIUM SERPL-MCNC: 9.1 MG/DL (ref 8.7–10.2)
CHLORIDE SERPL-SCNC: 101 MMOL/L (ref 96–106)
CO2 SERPL-SCNC: 22 MMOL/L (ref 20–29)
CREAT SERPL-MCNC: 0.57 MG/DL (ref 0.57–1)
ERYTHROCYTE [DISTWIDTH] IN BLOOD BY AUTOMATED COUNT: 14 % (ref 12.3–15.4)
GLOBULIN SER CALC-MCNC: 2.2 G/DL (ref 1.5–4.5)
GLUCOSE SERPL-MCNC: 87 MG/DL (ref 65–99)
HCT VFR BLD AUTO: 36.9 % (ref 34–46.6)
HGB BLD-MCNC: 12.5 G/DL (ref 11.1–15.9)
MCH RBC QN AUTO: 30.1 PG (ref 26.6–33)
MCHC RBC AUTO-ENTMCNC: 33.9 G/DL (ref 31.5–35.7)
MCV RBC AUTO: 89 FL (ref 79–97)
PLATELET # BLD AUTO: 268 X10E3/UL (ref 150–379)
POTASSIUM SERPL-SCNC: 4.1 MMOL/L (ref 3.5–5.2)
PROT SERPL-MCNC: 5.9 G/DL (ref 6–8.5)
RBC # BLD AUTO: 4.15 X10E6/UL (ref 3.77–5.28)
SODIUM SERPL-SCNC: 139 MMOL/L (ref 134–144)
WBC # BLD AUTO: 12.8 X10E3/UL (ref 3.4–10.8)

## 2018-10-24 NOTE — PROGRESS NOTES
Normal results, add to prenatal records. We can review in detail with patient at next visit.   Add PIH labs to PL w date (per protocol)

## 2018-10-26 LAB — GP B STREP DNA SPEC QL NAA+PROBE: POSITIVE

## 2018-10-29 ENCOUNTER — HOSPITAL ENCOUNTER (OUTPATIENT)
Dept: PERINATAL CARE | Age: 33
Discharge: HOME | End: 2018-10-29
Attending: OBSTETRICS & GYNECOLOGY
Payer: COMMERCIAL

## 2018-10-29 ENCOUNTER — ROUTINE PRENATAL (OUTPATIENT)
Dept: OBGYN CLINIC | Age: 33
End: 2018-10-29

## 2018-10-29 VITALS
DIASTOLIC BLOOD PRESSURE: 64 MMHG | SYSTOLIC BLOOD PRESSURE: 116 MMHG | WEIGHT: 221.2 LBS | BODY MASS INDEX: 34.72 KG/M2 | HEIGHT: 67 IN

## 2018-10-29 DIAGNOSIS — Z34.80 PRENATAL CARE OF MULTIGRAVIDA, ANTEPARTUM: Primary | ICD-10-CM

## 2018-10-29 DIAGNOSIS — Z3A.36 36 WEEKS GESTATION OF PREGNANCY: ICD-10-CM

## 2018-10-29 DIAGNOSIS — O10.913 MATERNAL CHRONIC HYPERTENSION IN THIRD TRIMESTER: ICD-10-CM

## 2018-10-29 PROCEDURE — 76818 FETAL BIOPHYS PROFILE W/NST: CPT | Performed by: OBSTETRICS & GYNECOLOGY

## 2018-10-29 PROCEDURE — 76816 OB US FOLLOW-UP PER FETUS: CPT | Performed by: OBSTETRICS & GYNECOLOGY

## 2018-10-29 RX ORDER — GUAIFENESIN 100 MG/5ML
81 LIQUID (ML) ORAL DAILY
COMMUNITY
End: 2018-11-07

## 2018-10-29 RX ORDER — FISH OIL/DHA/EPA 1200-144MG
CAPSULE ORAL
COMMUNITY
End: 2022-05-13

## 2018-10-29 NOTE — PROGRESS NOTES
_ 164 [facility]  [email or web address]  [phone]    Chelsi Hercules MD, FACOG     Follow-up OB visit    Chief Complaint   Patient presents with    Routine Prenatal Visit       Vitals:    10/29/18 1021   BP: 116/64   Weight: 221 lb 3.2 oz (100.3 kg)   Height: 5' 7\" (1.702 m)       Patient Active Problem List    Diagnosis Date Noted    Prenatal care of multigravida, antepartum 2018     Priority: 1 - One    Hypertension affecting pregnancy, antepartum 10/16/2018    Pre-existing essential hypertension during pregnancy, antepartum 2018    Hypertension during pregnancy, antepartum 2018    Hyperprolactinemia ([address] 2016    HTN (hypertension), benign 2015        The patient reports the following concerns: none    Moderate leuks noted in urine    See PN flowsheet for exam    28 y.o.  36w0d   Encounter Diagnoses   Name Primary?  Maternal chronic hypertension in third trimester     Prenatal care of multigravida, antepartum Yes    36 weeks gestation of pregnancy    SIPIH with BP above baseline       Strict PIH prec  Pt wants to continue outpt monitoring with MFM fu  Pt will notify MD if BP elevated at home, continue BP log  Plan phipps , pt will arrive 8-9pm  Called to notify [facility], unable to reach by phone, epic message sent to Dr. Lien Barrett h/o given  Disc r/b/a of induction and pitocin use and increase risk of longer labor,  section, bleeding and infection.       [] SAB/bleeding precautions reviewed   [] PTL/PPROM precautions reviewed   [] Labor precautions reviewed   [] Fetal kick counts discussed   [] Labs reviewed with patient   [] Mammie [place] precautions reviewed   [] Consent reviewed   [] Handouts given to pt   [] Glucola handout    [] GBS/labor/Magic Hour handout   []    [] We reviewed CDC recommendations for Tdap for patient and close contacts and RBA of receiving in pregnancy, advised obtaining in third trimester   [] Reviewed healthy nutrition in pregnancy and good exercise practices   [] We disc safer medications in pregnancy and referred patient to [facility] MATT resources   [] We reviewed CDC recommendations for flu vaccine and RBA of receiving in pregnancy   []    []    []         Follow-up Disposition:  Return in about 1 week (around 11/5/2018) for Follow up OB visit.     Orders Placed This Encounter    CREATININE, UR, 24 HR    PROTEIN, URINE, 24 HR       Coy Yanes MD

## 2018-10-29 NOTE — PATIENT INSTRUCTIONS
Weeks 34 to 36 of Your Pregnancy: Care Instructions  Your Care Instructions    By now, your baby and your belly have grown quite large. It is almost time to give birth. A full-term pregnancy can deliver between 37 and 42 weeks. Your baby's lungs are almost ready to breathe air. The bones in your baby's head are now firm enough to protect it, but soft enough to move down through the birth canal.  You may feel excited, happy, anxious, or scared. You may wonder how you will know if you are in labor or what to expect during labor. Try to be flexible in your expectations of the birth. Because each birth is different, there is no way to know exactly what childbirth will be like for you. This care sheet will help you know what to expect and how to prepare. This may make your childbirth easier. If you haven't already had the Tdap shot during this pregnancy, talk to your doctor about getting it. It will help protect your  against pertussis infection. In the 36th week, most women have a test for group B streptococcus (GBS). GBS is a common bacteria that can live in the vagina and rectum. It can make your baby sick after birth. If you test positive, you will get antibiotics during labor. The medicine will keep your baby from getting the bacteria. Follow-up care is a key part of your treatment and safety. Be sure to make and go to all appointments, and call your doctor if you are having problems. It's also a good idea to know your test results and keep a list of the medicines you take. How can you care for yourself at home? Learn about pain relief choices  · Pain is different for every woman. Talk with your doctor about your feelings about pain. · You can choose from several types of pain relief. These include medicine or breathing techniques, as well as comfort measures. You can use more than one option. · If you choose to have pain medicine during labor, talk to your doctor about your options.  Some medicines lower anxiety and help with some of the pain. Others make your lower body numb so that you won't feel pain. · Be sure to tell your doctor about your pain medicine choice before you start labor or very early in your labor. You may be able to change your mind as labor progresses. · Rarely, a woman is put to sleep by medicine given through a mask or an IV. Labor and delivery  · The first stage of labor has three parts: early, active, and transition. ? Most women have early labor at home. You can stay busy or rest, eat light snacks, drink clear fluids, and start counting contractions. ? When talking during a contraction gets hard, you may be moving to active labor. During active labor, you should head for the hospital if you are not there already. ? You are in active labor when contractions come every 3 to 4 minutes and last about 60 seconds. Your cervix is opening more rapidly. ? If your water breaks, contractions will come faster and stronger. ? During transition, your cervix is stretching, and contractions are coming more rapidly. ? You may want to push, but your cervix might not be ready. Your doctor will tell you when to push. · The second stage starts when your cervix is completely opened and you are ready to push. ? Contractions are very strong to push the baby down the birth canal.  ? You will feel the urge to push. You may feel like you need to have a bowel movement. ? You may be coached to push with contractions. These contractions will be very strong, but you will not have them as often. You can get a little rest between contractions. ? You may be emotional and irritable. You may not be aware of what is going on around you.  ? One last push, and your baby is born. · The third stage is when a few more contractions push out the placenta. This may take 30 minutes or less. · The fourth stage is the welcome recovery. You may feel overwhelmed with emotions and exhausted but alert.  This is a good time to start breastfeeding. Where can you learn more? Go to http://shikha-aleksandar.info/. Enter R522 in the search box to learn more about \"Weeks 34 to 36 of Your Pregnancy: Care Instructions. \"  Current as of: November 21, 2017  Content Version: 11.8  © 0939-9246 Healthwise, [employer]. Care instructions adapted under license by [employer] (which disclaims liability or warranty for this information). If you have questions about a medical condition or this instruction, always ask your healthcare professional. [address] any warranty or liability for your use of this information.

## 2018-10-30 LAB
CREAT 24H UR-MRATE: 1449 MG/24 HR (ref 800–1800)
CREAT UR-MCNC: 41.4 MG/DL
PROT 24H UR-MRATE: 214 MG/24 HR (ref 30–150)
PROT UR-MCNC: 6.1 MG/DL

## 2018-10-30 NOTE — PROGRESS NOTES
The results are normal.  Please update chart/history, if needed.   Notify pt  Add 24 hr urine to PL w date

## 2018-11-04 ENCOUNTER — HOSPITAL ENCOUNTER (INPATIENT)
Age: 33
LOS: 3 days | Discharge: HOME | End: 2018-11-07
Attending: OBSTETRICS & GYNECOLOGY | Admitting: OBSTETRICS & GYNECOLOGY
Payer: COMMERCIAL

## 2018-11-04 PROBLEM — Z34.90 PREGNANCY: Status: ACTIVE | Noted: 2018-11-04

## 2018-11-04 LAB
ALBUMIN SERPL-MCNC: 2.6 G/DL (ref 3.5–5)
ALBUMIN/GLOB SERPL: 0.8 {RATIO} (ref 1.1–2.2)
ALP SERPL-CCNC: 119 U/L (ref 45–117)
ALT SERPL-CCNC: 19 U/L (ref 12–78)
ANION GAP SERPL CALC-SCNC: 12 MMOL/L (ref 5–15)
AST SERPL-CCNC: 17 U/L (ref 15–37)
BILIRUB SERPL-MCNC: 0.3 MG/DL (ref 0.2–1)
BUN SERPL-MCNC: 10 MG/DL (ref 6–20)
BUN/CREAT SERPL: 11 (ref 12–20)
CALCIUM SERPL-MCNC: 9 MG/DL (ref 8.5–10.1)
CHLORIDE SERPL-SCNC: 103 MMOL/L (ref 97–108)
CO2 SERPL-SCNC: 25 MMOL/L (ref 21–32)
CREAT SERPL-MCNC: 0.9 MG/DL (ref 0.55–1.02)
ERYTHROCYTE [DISTWIDTH] IN BLOOD BY AUTOMATED COUNT: 13.2 % (ref 11.5–14.5)
GLOBULIN SER CALC-MCNC: 3.2 G/DL (ref 2–4)
GLUCOSE SERPL-MCNC: 117 MG/DL (ref 65–100)
HCT VFR BLD AUTO: 34.3 % (ref 35–47)
HGB BLD-MCNC: 11.8 G/DL (ref 11.5–16)
LDH SERPL L TO P-CCNC: 207 U/L (ref 81–246)
MCH RBC QN AUTO: 31 PG (ref 26–34)
MCHC RBC AUTO-ENTMCNC: 34.4 G/DL (ref 30–36.5)
MCV RBC AUTO: 90 FL (ref 80–99)
NRBC # BLD: 0 K/UL (ref 0–0.01)
NRBC BLD-RTO: 0 PER 100 WBC
PLATELET # BLD AUTO: 225 K/UL (ref 150–400)
PMV BLD AUTO: 11.8 FL (ref 8.9–12.9)
POTASSIUM SERPL-SCNC: 3.7 MMOL/L (ref 3.5–5.1)
PROT SERPL-MCNC: 5.8 G/DL (ref 6.4–8.2)
RBC # BLD AUTO: 3.81 M/UL (ref 3.8–5.2)
SODIUM SERPL-SCNC: 140 MMOL/L (ref 136–145)
URATE SERPL-MCNC: 4.7 MG/DL (ref 2.6–6)
WBC # BLD AUTO: 13.4 K/UL (ref 3.6–11)

## 2018-11-04 PROCEDURE — 65270000029 HC RM PRIVATE

## 2018-11-04 PROCEDURE — 80053 COMPREHEN METABOLIC PANEL: CPT | Performed by: OBSTETRICS & GYNECOLOGY

## 2018-11-04 PROCEDURE — 75410000002 HC LABOR FEE PER 1 HR: Performed by: OBSTETRICS & GYNECOLOGY

## 2018-11-04 PROCEDURE — 74011250637 HC RX REV CODE- 250/637: Performed by: OBSTETRICS & GYNECOLOGY

## 2018-11-04 PROCEDURE — 75410000000 HC DELIVERY VAGINAL/SINGLE: Performed by: OBSTETRICS & GYNECOLOGY

## 2018-11-04 PROCEDURE — 77010026065 HC OXYGEN MINIMUM MEDICAL AIR: Performed by: OBSTETRICS & GYNECOLOGY

## 2018-11-04 PROCEDURE — 3E033VJ INTRODUCTION OF OTHER HORMONE INTO PERIPHERAL VEIN, PERCUTANEOUS APPROACH: ICD-10-PCS | Performed by: OBSTETRICS & GYNECOLOGY

## 2018-11-04 PROCEDURE — 84550 ASSAY OF BLOOD/URIC ACID: CPT | Performed by: OBSTETRICS & GYNECOLOGY

## 2018-11-04 PROCEDURE — 76060000078 HC EPIDURAL ANESTHESIA: Performed by: ANESTHESIOLOGY

## 2018-11-04 PROCEDURE — 74011250636 HC RX REV CODE- 250/636: Performed by: OBSTETRICS & GYNECOLOGY

## 2018-11-04 PROCEDURE — 36415 COLL VENOUS BLD VENIPUNCTURE: CPT | Performed by: OBSTETRICS & GYNECOLOGY

## 2018-11-04 PROCEDURE — 85027 COMPLETE CBC AUTOMATED: CPT | Performed by: OBSTETRICS & GYNECOLOGY

## 2018-11-04 PROCEDURE — 75410000003 HC RECOV DEL/VAG/CSECN EA 0.5 HR: Performed by: OBSTETRICS & GYNECOLOGY

## 2018-11-04 PROCEDURE — 83615 LACTATE (LD) (LDH) ENZYME: CPT | Performed by: OBSTETRICS & GYNECOLOGY

## 2018-11-04 RX ORDER — SODIUM CHLORIDE 0.9 % (FLUSH) 0.9 %
5-10 SYRINGE (ML) INJECTION EVERY 8 HOURS
Status: DISCONTINUED | OUTPATIENT
Start: 2018-11-04 | End: 2018-11-06

## 2018-11-04 RX ORDER — SODIUM CHLORIDE, SODIUM LACTATE, POTASSIUM CHLORIDE, CALCIUM CHLORIDE 600; 310; 30; 20 MG/100ML; MG/100ML; MG/100ML; MG/100ML
125 INJECTION, SOLUTION INTRAVENOUS CONTINUOUS
Status: DISCONTINUED | OUTPATIENT
Start: 2018-11-04 | End: 2018-11-05 | Stop reason: HOSPADM

## 2018-11-04 RX ORDER — MAG HYDROX/ALUMINUM HYD/SIMETH 200-200-20
30 SUSPENSION, ORAL (FINAL DOSE FORM) ORAL
Status: DISCONTINUED | OUTPATIENT
Start: 2018-11-04 | End: 2018-11-07 | Stop reason: HOSPADM

## 2018-11-04 RX ORDER — OXYTOCIN/0.9 % SODIUM CHLORIDE 30/500 ML
0-25 PLASTIC BAG, INJECTION (ML) INTRAVENOUS
Status: DISCONTINUED | OUTPATIENT
Start: 2018-11-04 | End: 2018-11-07 | Stop reason: HOSPADM

## 2018-11-04 RX ORDER — ZOLPIDEM TARTRATE 5 MG/1
5 TABLET ORAL
Status: DISCONTINUED | OUTPATIENT
Start: 2018-11-04 | End: 2018-11-07 | Stop reason: HOSPADM

## 2018-11-04 RX ORDER — SODIUM CHLORIDE 0.9 % (FLUSH) 0.9 %
5-10 SYRINGE (ML) INJECTION AS NEEDED
Status: DISCONTINUED | OUTPATIENT
Start: 2018-11-04 | End: 2018-11-07 | Stop reason: HOSPADM

## 2018-11-04 RX ORDER — LIDOCAINE HYDROCHLORIDE 10 MG/ML
10 INJECTION INFILTRATION; PERINEURAL
Status: DISCONTINUED | OUTPATIENT
Start: 2018-11-04 | End: 2018-11-05 | Stop reason: HOSPADM

## 2018-11-04 RX ADMIN — ALUMINUM HYDROXIDE, MAGNESIUM HYDROXIDE, AND SIMETHICONE 30 ML: 200; 200; 20 SUSPENSION ORAL at 21:41

## 2018-11-04 RX ADMIN — SODIUM CHLORIDE, SODIUM LACTATE, POTASSIUM CHLORIDE, AND CALCIUM CHLORIDE 75 ML/HR: 600; 310; 30; 20 INJECTION, SOLUTION INTRAVENOUS at 20:42

## 2018-11-05 ENCOUNTER — ANESTHESIA (OUTPATIENT)
Dept: LABOR AND DELIVERY | Age: 33
End: 2018-11-05
Payer: COMMERCIAL

## 2018-11-05 ENCOUNTER — ANESTHESIA EVENT (OUTPATIENT)
Dept: LABOR AND DELIVERY | Age: 33
End: 2018-11-05
Payer: COMMERCIAL

## 2018-11-05 PROCEDURE — 0UQMXZZ REPAIR VULVA, EXTERNAL APPROACH: ICD-10-PCS | Performed by: OBSTETRICS & GYNECOLOGY

## 2018-11-05 PROCEDURE — 74011250637 HC RX REV CODE- 250/637: Performed by: OBSTETRICS & GYNECOLOGY

## 2018-11-05 PROCEDURE — 00HU33Z INSERTION OF INFUSION DEVICE INTO SPINAL CANAL, PERCUTANEOUS APPROACH: ICD-10-PCS | Performed by: ANESTHESIOLOGY

## 2018-11-05 PROCEDURE — 77030014125 HC TY EPDRL BBMI -B: Performed by: ANESTHESIOLOGY

## 2018-11-05 PROCEDURE — 74011000258 HC RX REV CODE- 258: Performed by: OBSTETRICS & GYNECOLOGY

## 2018-11-05 PROCEDURE — 65270000029 HC RM PRIVATE

## 2018-11-05 PROCEDURE — 0HQ9XZZ REPAIR PERINEUM SKIN, EXTERNAL APPROACH: ICD-10-PCS | Performed by: OBSTETRICS & GYNECOLOGY

## 2018-11-05 PROCEDURE — 74011000250 HC RX REV CODE- 250

## 2018-11-05 PROCEDURE — 74011250636 HC RX REV CODE- 250/636: Performed by: ANESTHESIOLOGY

## 2018-11-05 PROCEDURE — 10907ZC DRAINAGE OF AMNIOTIC FLUID, THERAPEUTIC FROM PRODUCTS OF CONCEPTION, VIA NATURAL OR ARTIFICIAL OPENING: ICD-10-PCS | Performed by: OBSTETRICS & GYNECOLOGY

## 2018-11-05 PROCEDURE — 77030011943

## 2018-11-05 PROCEDURE — 77030034850

## 2018-11-05 PROCEDURE — 77030018749 HC HK AMNIO DISP DERY -A

## 2018-11-05 PROCEDURE — 74011250636 HC RX REV CODE- 250/636: Performed by: OBSTETRICS & GYNECOLOGY

## 2018-11-05 PROCEDURE — 75410000002 HC LABOR FEE PER 1 HR: Performed by: OBSTETRICS & GYNECOLOGY

## 2018-11-05 RX ORDER — OXYTOCIN/RINGER'S LACTATE 20/1000 ML
PLASTIC BAG, INJECTION (ML) INTRAVENOUS
Status: DISPENSED
Start: 2018-11-05 | End: 2018-11-06

## 2018-11-05 RX ORDER — HYDROCORTISONE ACETATE PRAMOXINE HCL 2.5; 1 G/100G; G/100G
CREAM TOPICAL AS NEEDED
Status: DISCONTINUED | OUTPATIENT
Start: 2018-11-05 | End: 2018-11-07 | Stop reason: HOSPADM

## 2018-11-05 RX ORDER — HYDROCODONE BITARTRATE AND ACETAMINOPHEN 5; 325 MG/1; MG/1
1 TABLET ORAL
Status: DISCONTINUED | OUTPATIENT
Start: 2018-11-05 | End: 2018-11-07 | Stop reason: HOSPADM

## 2018-11-05 RX ORDER — ONDANSETRON 4 MG/1
4 TABLET, ORALLY DISINTEGRATING ORAL
Status: ACTIVE | OUTPATIENT
Start: 2018-11-05 | End: 2018-11-06

## 2018-11-05 RX ORDER — IBUPROFEN 800 MG/1
800 TABLET ORAL EVERY 8 HOURS
Status: DISCONTINUED | OUTPATIENT
Start: 2018-11-05 | End: 2018-11-07 | Stop reason: HOSPADM

## 2018-11-05 RX ORDER — SIMETHICONE 80 MG
80 TABLET,CHEWABLE ORAL
Status: DISCONTINUED | OUTPATIENT
Start: 2018-11-05 | End: 2018-11-07 | Stop reason: HOSPADM

## 2018-11-05 RX ORDER — OXYTOCIN/RINGER'S LACTATE 20/1000 ML
125-500 PLASTIC BAG, INJECTION (ML) INTRAVENOUS ONCE
Status: ACTIVE | OUTPATIENT
Start: 2018-11-05 | End: 2018-11-06

## 2018-11-05 RX ORDER — BUPIVACAINE HYDROCHLORIDE 2.5 MG/ML
INJECTION, SOLUTION EPIDURAL; INFILTRATION; INTRACAUDAL AS NEEDED
Status: DISCONTINUED | OUTPATIENT
Start: 2018-11-05 | End: 2018-11-05 | Stop reason: HOSPADM

## 2018-11-05 RX ORDER — DIPHENHYDRAMINE HCL 25 MG
25 CAPSULE ORAL
Status: DISCONTINUED | OUTPATIENT
Start: 2018-11-05 | End: 2018-11-07 | Stop reason: HOSPADM

## 2018-11-05 RX ORDER — ACETAMINOPHEN 325 MG/1
650 TABLET ORAL
Status: DISCONTINUED | OUTPATIENT
Start: 2018-11-05 | End: 2018-11-07 | Stop reason: HOSPADM

## 2018-11-05 RX ORDER — NALOXONE HYDROCHLORIDE 0.4 MG/ML
0.4 INJECTION, SOLUTION INTRAMUSCULAR; INTRAVENOUS; SUBCUTANEOUS AS NEEDED
Status: DISCONTINUED | OUTPATIENT
Start: 2018-11-05 | End: 2018-11-07 | Stop reason: HOSPADM

## 2018-11-05 RX ORDER — EPHEDRINE SULFATE 50 MG/ML
10 INJECTION, SOLUTION INTRAVENOUS
Status: DISCONTINUED | OUTPATIENT
Start: 2018-11-05 | End: 2018-11-05 | Stop reason: HOSPADM

## 2018-11-05 RX ORDER — FENTANYL/BUPIVACAINE/NS/PF 2-1250MCG
1-16 PREFILLED PUMP RESERVOIR EPIDURAL CONTINUOUS
Status: DISCONTINUED | OUTPATIENT
Start: 2018-11-05 | End: 2018-11-07 | Stop reason: HOSPADM

## 2018-11-05 RX ORDER — DOCUSATE SODIUM 100 MG/1
100 CAPSULE, LIQUID FILLED ORAL
Status: DISCONTINUED | OUTPATIENT
Start: 2018-11-05 | End: 2018-11-07 | Stop reason: HOSPADM

## 2018-11-05 RX ADMIN — PENICILLIN G POTASSIUM 2.5 MILLION UNITS: 20000000 POWDER, FOR SOLUTION INTRAVENOUS at 08:13

## 2018-11-05 RX ADMIN — IBUPROFEN 800 MG: 800 TABLET ORAL at 16:20

## 2018-11-05 RX ADMIN — SODIUM CHLORIDE, SODIUM LACTATE, POTASSIUM CHLORIDE, AND CALCIUM CHLORIDE 999 ML/HR: 600; 310; 30; 20 INJECTION, SOLUTION INTRAVENOUS at 06:50

## 2018-11-05 RX ADMIN — OXYTOCIN-SODIUM CHLORIDE 0.9% IV SOLN 30 UNIT/500ML 2 MILLI-UNITS/MIN: 30-0.9/5 SOLUTION at 05:19

## 2018-11-05 RX ADMIN — SODIUM CHLORIDE 5 MILLION UNITS: 900 INJECTION, SOLUTION INTRAVENOUS at 04:39

## 2018-11-05 RX ADMIN — SODIUM CHLORIDE, SODIUM LACTATE, POTASSIUM CHLORIDE, AND CALCIUM CHLORIDE 1000 ML: 600; 310; 30; 20 INJECTION, SOLUTION INTRAVENOUS at 09:08

## 2018-11-05 RX ADMIN — SODIUM CHLORIDE, SODIUM LACTATE, POTASSIUM CHLORIDE, AND CALCIUM CHLORIDE 999 ML/HR: 600; 310; 30; 20 INJECTION, SOLUTION INTRAVENOUS at 06:33

## 2018-11-05 RX ADMIN — Medication 10 ML/HR: at 10:02

## 2018-11-05 RX ADMIN — SODIUM CHLORIDE, SODIUM LACTATE, POTASSIUM CHLORIDE, AND CALCIUM CHLORIDE 125 ML/HR: 600; 310; 30; 20 INJECTION, SOLUTION INTRAVENOUS at 04:39

## 2018-11-05 RX ADMIN — BUPIVACAINE HYDROCHLORIDE 10 ML: 2.5 INJECTION, SOLUTION EPIDURAL; INFILTRATION; INTRACAUDAL at 09:47

## 2018-11-05 NOTE — L&D DELIVERY NOTE
Delivery Summary    Patient: Erin Marino MRN: [id number]  SSN: [id number]    YOB: 1985  Age: 35 y.o. Sex: female       Information for the patient's :  Pamela Antunez, Female [[id number]]       Labor Events:    Labor: No   Rupture Date: 2018   Rupture Time: 11:14 AM   Rupture Type AROM   Amniotic Fluid Volume: Moderate    Amniotic Fluid Description: Clear None   Induction: AROM; Oxytocin       Augmentation: None   Labor Events: None     Cervical Ripening:     None     Delivery Events:  Episiotomy: None   Laceration(s): First degree perineal;Right labial     Repaired:      Number of Repair Packets: 1   Suture Type and Size: Other 4-0 monycryl   Estimated Blood Loss (ml): 100ml       Delivery Date: 2018    Delivery Time: 11:31 AM  Delivery Type: Vaginal, Spontaneous  Sex:  Female     Gestational Age: 37w0d   Delivery Clinician:  Alicia Robles  Living Status: Living   Delivery Location: L&D            APGARS  One minute Five minutes Ten minutes   Skin color: 1   1        Heart rate: 2   2        Grimace: 2   2        Muscle tone: 2   2        Breathin   2        Totals: 9   9            Presentation: Vertex    Position: Left Occiput Anterior  Resuscitation Method:  Suctioning-bulb; Tactile Stimulation     Meconium Stained: None      Cord Information: 3 Vessels  Complications: None;Nuchal Cord With Compressions  Cord around: head  Delayed cord clamping?  Yes  Cord clamped date/time:   Disposition of Cord Blood:      Blood Gases Sent?: No    Placenta:  Date/Time: 2018 11:34 AM  Removal: Spontaneous      Appearance: Normal;Intact      Measurements:  Birth Weight: 6 lb 9.5 oz (2.99 kg)      Birth Length: 1' 6.5\" (0.47 m)      Head Circumference: 1' 1.39\" (0.34 m)      Chest Circumference: 1' 0.21\" (0.31 m)     Abdominal Girth: 1' 0.99\" (0.33 m)    Other Providers:   STANLEY CEDILLO;SHIRLEY NAJERA;BEE HOYT;IOANA ROSAS;MARIALUISA OLIVAREZ;CASTILLO MI;DRE Damaso Casas;;;, Obstetrician;Primary Nurse;Primary [place] Nurse;Charge Nurse;Staff Nurse;Tech;Tech;Nurse Practitioner;Midwife;Nursery Nurse           Group B Strep:   Lab Results   Component Value Date/Time    Karyn, External Positive 10/23/2018     Information for the patient's :  Shayy Ritter, Female [[id number]]   No results found for: ABORH, PCTABR, PCTDIG, BILI, ABORHEXT, ABORH    No results found for: APH, APCO2, APO2, AHCO3, ABEC, ABDC, O2ST, EPHV, PCO2V, PO2V, HCO3V, EBEV, EBDV, SITE, RSCOM      Delivery Note    Patient C/C/+2, pushed over intact perineum. LBFI  Delayed cord clamping  Spontaneous placenta delivery.   Laceration repaired: small right labial and 1st degree perineum  Counts correct  Fetal measurements pending

## 2018-11-05 NOTE — LACTATION NOTE
This note was copied from a baby's chart. Pt will successfully establish breastfeeding by feeding in response to early feeding cues  
or wake every 3h, will obtain deep latch, and will keep log of feedings/output. Taught to BF at hunger cues and or q 2-3 hrs and to offer 10-20 drops of hand expressed colostrum at any non-feeds. Breast Assessment Left Breast: Medium Right Breast: Medium Breast- Feeding Assessment Attends Breast-Feeding Classes: Yes(1st baby) Breast-Feeding Experience: Yes(18 months) Breast Trauma/Surgery: No 
Type/Quality: Good Mother/Infant Observation Mother Observation: Alignment Infant Observation: Latches nipple and aereolae, Lips flanged, lower, Lips flanged, upper LATCH Documentation Latch: Repeated attempts, hold nipple in mouth, stimulate to suck Audible Swallowing: A few with stimulation Type of Nipple: Everted (after stimulation) Comfort (Breast/Nipple): Soft/non-tender Hold (Positioning): Full assist, teach one side, mother does other, staff holds LATCH Score: 7

## 2018-11-05 NOTE — PROGRESS NOTES
11/05/18 10:12 AM 
CM met with patient and her /FOB Barb Camilo ([phone]) to complete initial assessment and to begin discharge planning. Demographics were reviewed and confirmed, as the couple and their 3year old son live in [place]. Patient does not work outside the home, FOB works for [employer] and will have 6 weeks off from work. Friends live locally, family will be in town from [place] [phone] over the next few weeks; all will assist as needed. Patient plans to breastfeed and has a pump to use at home. Dr. Kameron Gavin at [facility] location) will provide medical follow up for the baby. Patient has car seat, crib, clothing, and other necessary supplies. Denied need for [facility] and Medicaid services. Denied any additional needs, FOB will provide transportation home at discharge and to all follow up appointments. Care Management Interventions PCP Verified by CM: Yes Mode of Transport at Discharge: Self Transition of Care Consult (CM Consult): Discharge Planning Current Support Network: Lives with Spouse, Family Lives [place] Confirm Follow Up Transport: Family Plan discussed with Pt/Family/Caregiver: Yes Discharge Location Discharge Placement: Home with family assistance ARMANDO Low

## 2018-11-05 NOTE — PROGRESS NOTES
Labor Progress Note Patient seen, fetal heart rate and contraction pattern evaluated. No ha/cp/sob/vision change. Good FM. Physical Exam: 
Visit Vitals BP (!) 141/93 Pulse (!) 101 Temp 98.5 °F (36.9 °C) Resp 16 Ht 5' 7\" (1.702 m) Wt 221 lb (100.2 kg) SpO2 98% BMI 34.61 kg/m² Cervical Exam: Cervical Exam 
Dilation (cm): (3-4) Eff: 30 % Station: -3 Vaginal exam done by? : Dr Kade Patel Membrane Status: AROM(by Dr Kade Patel) Membranes:  Artificial Rupture of Membranes; Amniotic Fluid: medium amount of clear fluid Uterine Activity:  Frequency: Every 3 minutes See nst note Assessment/Plan: 
28 y.o.  37w0d [facility] Stable BP Reassuring fetal status. Anticipate  CEFM/TOCO Jamia Edwards MD 
 
 
NST Inpatient Procedure Note Eileen Castellon presents for fetal non-stress test.   
Indication is [facility]. She is 37w0d. She has been monitored for more than 30 minutes. The FHR was reactive. NST Interpretation: FHR baseline 150 bpm,  
Variability moderate Accelerations present. Decelerations Absent. Uterine contractions were present, q 3-5 minutes Assessment NST is reactive. NST is reassuring. Patient does need admission/observation for further monitoring. Pato Vail was informed of the NST results and her questions were answered. Plan:  
 [x] Continue admission to labor and delivery  
 [] Continue observation 
 [] Keep routine OB follow up upon discharge 
 [] Reviewed fetal movement kick counts, notify MD if decreased 
 [x] anticipate  [x] titrate pitocin while RFS per protocol/orders

## 2018-11-05 NOTE — PROGRESS NOTES
1277 Dr Pearl Chand in  Room aware of recorded bps 141/93. No orders received. Dr Pearl Chand reviewed POC with pt and EFM strip. AROM for mod clear fluid. SVE by Dr Pearl Chand 3-4/30/-3. Tolerated well. Per Dr Pearl Chand may have epidural when pt desires 1270 Dr Jesika Miller made aware of maternal pulse elevated to 130s and returns to baseline 100s. Per Dr Jesika Miller may connect epidural pump 
 
1002 Dr Jesika Miller into see pt. Pt denies any c/o  
 
1312 Dr Pearl Chand called and informed pt past several nickel to dime size clots, fundus firm at umilicus and midline, @nd bag of pitocin infusing. Orders received to straight cath now 26 Dr Pearl Chand into assess vaginal bleeding, small lochia rubra at this time 1600 TRANSFER - OUT REPORT: 
 
Verbal report given to JOSE Pastrana RN(name) on Josh Mask  being transferred to miu 312(unit) for routine progression of care Report consisted of patients Situation, Background, Assessment and  
Recommendations(SBAR). Information from the following report(s) SBAR, Kardex, Intake/Output, MAR and Recent Results was reviewed with the receiving nurse. Lines:  
Peripheral IV 11/04/18 Right Hand (Active) Site Assessment Clean, dry, & intact 11/5/2018  7:30 AM  
Phlebitis Assessment 0 11/5/2018  7:30 AM  
Infiltration Assessment 0 11/5/2018  7:30 AM  
Dressing Status Clean, dry, & intact 11/5/2018  7:30 AM  
Dressing Type Transparent 11/5/2018  7:30 AM  
Hub Color/Line Status Pink; Infusing 11/5/2018  7:30 AM  
Action Taken Open ports on tubing capped 11/5/2018  4:10 AM  
Alcohol Cap Used Yes 11/5/2018  7:30 AM  
  
 
Opportunity for questions and clarification was provided. With Baby in crib , baby 
 band verified Patient transported with: 
 Registered Nurse

## 2018-11-05 NOTE — ANESTHESIA PREPROCEDURE EVALUATION
Anesthetic History No history of anesthetic complications Review of Systems / Medical History Patient summary reviewed and pertinent labs reviewed Pulmonary Within defined limits Neuro/Psych Within defined limits Cardiovascular Hypertension GI/Hepatic/Renal 
Within defined limits Endo/Other Within defined limits Other Findings Physical Exam 
 
Airway Mallampati: II 
TM Distance: 4 - 6 cm Neck ROM: normal range of motion Mouth opening: Normal 
 
 Cardiovascular Regular rate and rhythm,  S1 and S2 normal,  no murmur, click, rub, or gallop Rhythm: regular Rate: normal 
 
 
 
 Dental 
No notable dental hx Pulmonary Breath sounds clear to auscultation Abdominal 
GI exam deferred Other Findings Anesthetic Plan ASA: 2 Anesthesia type: epidural 
 
 
 
 
 
Anesthetic plan and risks discussed with: Patient Charting completed after epidural placement.

## 2018-11-05 NOTE — LACTATION NOTE
This note was copied from a baby's chart. Discussed with mother her plan for feeding. Reviewed the benefits of exclusive breast milk feeding during the hospital stay. Informed her of the risks of using formula to supplement in the first few days of life as well as the benefits of successful breast milk feeding; referred her to the Breastfeeding booklet about this information. She acknowledges understanding of information reviewed and states that it is her plan to breastfeed her infant. Will support her choice and offer additional information as needed. Baby skin to skin  Resting with Mom. Instructed Mom to help baby when she starts to root. Let LC know if she needs assistance and to see latch

## 2018-11-05 NOTE — H&P
History & Physical 
 
Name: Radha Jaimes MRN: [id number]  SSN: [id number] YOB: 1985  Age: 28 y.o. Sex: female Subjective:  
 
Estimated Date of Delivery: 18 OB History  Para Term  AB Living 2 1 1 0 0 1 SAB TAB Ectopic Molar Multiple Live Births  
0 0 0   0 1 # Outcome Date GA Lbr Piter/2nd Weight Sex Delivery Anes PTL Lv  
2 Current 1 Term 10/29/14 37w6d  2.795 kg M VAGINAL DELI EPIDURAL AN N ARTHUR Ms. Lupe Balderrama is a  admitted with pregnancy at 36w6d for induction of labor due to worsening chronic hypertension. She has occasional bonnie donovan contractions. Denies headache, persistent vision changes, nausea, vomiting, ruq pain, epigastric pain, and worsening edema. Prenatal course complicated by worsening hypertension. Please see prenatal records for details. Past Medical History:  
Diagnosis Date  Essential hypertension  Family history of Down syndrome 3/27/2014  HX OTHER MEDICAL   
 gardasil X3  
 Pap smear for cervical cancer screening 1/15/13; 2016  
 negative; negative, HPV negative History reviewed. No pertinent surgical history. Social History Occupational History  Not on file Tobacco Use  Smoking status: Never Smoker  Smokeless tobacco: Never Used Substance and Sexual Activity  Alcohol use: No  
 Drug use: No  
 Sexual activity: Yes  
  Partners: Male Birth control/protection: Condom Comment: depo Family History Problem Relation Age of Onset  Breast Cancer Paternal Grandmother  Diabetes Maternal Grandfather  Hypertension Mother  Diabetes Other   
     uncle  No Known Problems Father Allergies Allergen Reactions  Cefzil [Cefprozil] Hives Tolerated z-shari, amoxicillin  
has taken amoxicillin in the past without any reaction Prior to Admission medications Medication Sig Start Date End Date Taking? Authorizing Provider aspirin 81 mg chewable tablet Take 81 mg by mouth daily. Yes Provider, Historical  
fish oil-dha-epa 1,200-144-216 mg cap Take  by mouth. Yes Provider, Historical  
PRENATAL VITS W-CA,FE,FA,<1MG, (PRENATAL VITAMIN PO) Take  by mouth. Yes Provider, Historical  
Cetirizine (ZYRTEC) 10 mg cap Take  by mouth. Provider, Historical  
CALCIUM PO Take  by mouth. Provider, Historical  
  
 
Review of Systems: Pertinent items are noted in the History of Present Illness. Objective:  
 
Vitals: 
Vitals:  
 18 BP:  148/88  149/86 Pulse:  (!) 110  (!) 101 SpO2: 98% 99% 99% Weight:      
Height:      
  
 
Physical Exam: 
Patient without distress. Heart: Regular rate and rhythm or S1S2 present Lung: clear to auscultation throughout lung fields, no wheezes, no rales, no rhonchi and normal respiratory effort Abdomen: soft, nontender, gravid, efw 7 pounds Fundus: soft and non tender Perineum: blood absent, amniotic fluid absent Cervical Exam: 4/50/-3 vtx Lower Extremities:  - Edema 1+, dtr 2+ Membranes:  Intact Fetal Heart Rate: Baseline: 140 per minute Variability: moderate Accelerations: yes Decelerations: none Uterine contractions: irregular Prenatal Labs:  
Lab Results Component Value Date/Time  
 Rubella, External Non Immune 2018 Rubella, External Immune 2018 HBsAg, External Negative 2018 HIV, External Non Reactive 2018 Gonorrhea, External Negative 2018 Chlamydia, External Negative 2018 ABO,Rh A Positive 2014 GrBStrep, External Positive 10/23/2018 Impression/Plan:  
 
27 yo  at 36 6/7 wks induction for worsening hypertension, favorable monique score, GBS positive, cat 1 fetal tracing Pitocin at 0500 Start PCN with the initiation of pitocin Signed By:  Heidi Wilkes MD   
 2018

## 2018-11-05 NOTE — ANESTHESIA PROCEDURE NOTES
Epidural Block Start time: 11/5/2018 9:38 AM 
End time: 11/5/2018 9:52 AM 
Performed by: Duke Garcia MD 
Authorized by: Duke Garcia MD  
 
Pre-Procedure Indication: labor epidural   
Preanesthetic Checklist: patient identified, risks and benefits discussed, anesthesia consent, timeout performed and anesthesia consent Timeout Time: 09:38 Epidural:  
Patient position:  Seated Prep region:  Lumbar Prep: Betadine Location:  L3-4 Needle and Epidural Catheter:  
Needle Type:  Tuohy Needle Gauge:  17 G Injection Technique:  Loss of resistance using air Attempts:  1 Catheter Size:  18 G Events: no paresthesia and negative aspiration test   
Test Dose:  Lidocaine 1.5% w/ epi and negative Assessment:  
Catheter Secured:  Tegaderm and tape Insertion:  Uncomplicated Patient tolerance:  Patient tolerated the procedure well with no immediate complications

## 2018-11-05 NOTE — PROGRESS NOTES
1956: Pt arrives ambulatory to L&D unit accompanied by FOB for phipps bulb IOL. Pt denies HA, visual disturbances, RUQ pain, vaginal bleeding. Pt states that she feels occasional \"bonnie donovan\". Pt denies any other complications of pregnancy except elevated BPs and polyhydramnios, which she states \"all recent ultrasounds of shown normal amounts of fluid. \" Pt placed on EFM. PIV placed. 2053Nancy Edgar ALICEA at bedside to evaluate pt and place phipps bulb.  
 
2100: SVE performed by Rina Hilliard MD. Juanis Yakov begin pitocin at 0500. Begin GBS tx at this time as well. RN and pt discuss the procedure for an epidural. RN makes pt aware that she must have a liter of IVF bolused before the epidural and that can take approx 45 minutes. Pt verbalizes understanding and agrees. 2305: RN at bedside for purposeful rounding. Pt observed sleeping at this time. Respirations even and unlabored. FOB observed sleeping in cot at bedside. 0009: RN at bedside for purposeful rounding. Pt observed sleeping at this time. Respirations even and unlabored. 0217: RN at bedside for purposeful rounding. Pt observed sleeping at this time. Respirations even and unlabored. 0404: RN at bedside to wake pt for shower. Vitals and assessment performed. Pt denies HA, visual disturbances, RUQ pain. Pt denies need of anything at this time. Pt to ring out to nurses stations when she is finished with shower to be placed on EFM. When asked if pt usually has elevated HR, pt denies and states that she is nervous about the day. Will continue to monitor and notify provider regarding elevated pulse. 3517: Pt placed on EFM. IVF bolus began & left hip tilt for FHR. PCN tx for GBS status began. Pt denies need of anything else at this time. 0450: Juice given to pt for FHT. 0: RN at the bedside to begin oxytocin. Juice given to pt at this time for [facility]. RN discusses visitors with pt at this time.  RN explains that there is not visitor policy, however staff reserves the right to ask visitors to leave. Pt and FOB verbalizes understanding and agree. Pt denies need of anything at this time. 2461: IVF bolus began for epidural. Pt turned to right lateral, oxytocin decreased in half, ice packs applied to abdomen d/t FHT. 0645: Oxygen applied. RN remains at bedside. Will continue to monitor FHT. 0700: Bedside and Verbal shift change report given to Daniel Lerma RN (oncoming nurse) by Malik Dillon RN (offgoing nurse). Report included the following information SBAR, Kardex, Intake/Output, MAR, Accordion, Recent Results and Med Rec Status.

## 2018-11-06 PROCEDURE — 74011250637 HC RX REV CODE- 250/637: Performed by: OBSTETRICS & GYNECOLOGY

## 2018-11-06 PROCEDURE — 65270000029 HC RM PRIVATE

## 2018-11-06 RX ADMIN — IBUPROFEN 800 MG: 800 TABLET ORAL at 07:51

## 2018-11-06 RX ADMIN — IBUPROFEN 800 MG: 800 TABLET ORAL at 23:17

## 2018-11-06 RX ADMIN — IBUPROFEN 800 MG: 800 TABLET ORAL at 00:02

## 2018-11-06 RX ADMIN — IBUPROFEN 800 MG: 800 TABLET ORAL at 15:49

## 2018-11-06 NOTE — PROGRESS NOTES
Post-Partum Progress Note Patient doing well post-partum without significant complaint. She is voiding without difficulty, she reports normal lochia. Her pain is well controlled with oral pain medication. She is tolerating a general diet. Delivery information: 
Information for the patient's :  Huseyin Valencia, Female [[id number]] Delivery of a 6 lb 9.5 oz (2.99 kg) female infant via Vaginal, Spontaneous on 2018 at 11:31 AM  by . Apgars were 9 and 9. Vitals:   
Patient Vitals for the past 8 hrs: 
 BP Temp Pulse Resp 18 0602 130/76 98.2 °F (36.8 °C) 87 18 Temp (24hrs), Av.2 °F (36.8 °C), Min:98 °F (36.7 °C), Max:98.4 °F (36.9 °C) Visit Vitals /76 Pulse 87 Temp 98.2 °F (36.8 °C) Resp 18 Ht 5' 7\" (1.702 m) Wt 221 lb (100.2 kg) SpO2 98% Breastfeeding? Unknown BMI 34.61 kg/m² Blood Pressure 24 hour summary Diastolic (27QBF), I, Min:56, Max:88 Systolic (17ZQG), SSO:997 , Min:116 , Max:143 Exam:   
General: Patient without distress. Abdomen: soft, fundus firm at level of umbilicus, nontender Lower extremities: negative for cords or tenderness. Labs: No results found for this or any previous visit (from the past 24 hour(s)). Assessment: 1. Postpartum S/P spontaneous vaginal delivery 2. Patient doing well without significant complications Plan: 1. Continue routine postpartum care 2. Routine perineal care and maternal education 3. Oral pain medications and bowel regimen as needed Ariel Orellana MD

## 2018-11-06 NOTE — PROGRESS NOTES
Bedside and Verbal shift change report given to [facility] (oncoming nurse) by Jose Alejandro Fowler RN (offgoing nurse). Report included the following information SBAR, Intake/Output, MAR and Recent Results.

## 2018-11-06 NOTE — LACTATION NOTE
Problem: Lactation Care Plan Goal: *Infant latching appropriately Outcome: Progressing Towards Goal 
Pt will successfully establish breastfeeding by feeding in response to early feeding cues  
or wake every 3h, will obtain deep latch, and will keep log of feedings/output. Taught to BF at hunger cues and or q 2-3 hrs and to offer 10-20 drops of hand expressed colostrum at any non-feeds.   
  
Breast Assessment Left Breast: Medium Left Nipple: Intact, Everted Right Breast: Medium Right Nipple: Everted, Intact Breast- Feeding Assessment Attends Breast-Feeding Classes: Yes(1st baby) Breast-Feeding Experience: Yes(18 months) Breast Trauma/Surgery: No 
Type/Quality: Good Lactation Consultant Visits Breast-Feedings: Good Mother/Infant Observation Mother Observation: Breast comfortable, Close hold, Lets baby end feeding, Recognizes feeding cues, Holds breast 
Infant Observation: Audible swallows, Breast tissue moves, Lips flanged, lower, Opens mouth, Rhythmic suck, Lips flanged, upper LATCH Documentation Latch: Grasps breast, tongue down, lips flanged, rhythmic sucking Audible Swallowing: Spontaneous and intermittent (24 hours old) Type of Nipple: Everted (after stimulation) Comfort (Breast/Nipple): Soft/non-tender Hold (Positioning): No assist from staff, mother able to position/hold infant LATCH Score: 10 
  
Mom comfortable and relaxed with breast feeding.  
  
Goal: *Weight loss less than 10% of birth weight Outcome: Progressing Towards Goal 
  
Encouraged mom to attempt feeding with baby led feeding cues. Just as sucking on fingers, rooting, mouthing. Looking for 8-12 feedings in 24 hours. Don't limit baby at breast, allow baby to come of breast on it's own. Baby may want to feed  often and may increase number of feedings on second day of life.  Skin to skin encouraged.  
  
 If baby doesn't nurse,  Mom should  hand express  10-20 drops of colostrum and drip into baby's mouth, or give to baby by finger feeding, cup feeding, or spoon feeding at least every 2-3 hours.  
  
  
Problem: Patient Education: Go to Patient Education Activity Goal: Patient/Family Education Outcome: Progressing Towards Goal 
Reviewed breastfeeding basics:  Supply and demand,  stomach size, early  Feeding cues, skin to skin, positioning and baby led latch-on, assymetrical latch with signs of good, deep latch vs shallow, feeding frequency and duration, and log sheet for tracking infant feedings and output. Breastfeeding Booklet and Warm line information given. Discussed typical  weight loss and the importance of infant weight checks with pediatrician 1-2 post discharge. 
  
Discussed with mother her plan for feeding. Reviewed the benefits of exclusive breast milk feeding during the hospital stay. Informed her of the risks of using formula to supplement in the first few days of life as well as the benefits of successful breast milk feeding; referred her to the Breastfeeding booklet about this information. She acknowledges understanding of information reviewed and states that it is her plan to breast her infant. Will support her choice and offer additional information as needed.  
  
Hand Expression Education:  Mom taught how to manually hand express her colostrum. Emphasized the importance of providing infant with valuable colostrum as infant rests skin to skin at breast.  Aware to avoid extended periods of non-feeding. Aware to offer 10-20+ drops of colostrum every 2-3 hours until infant is latching and nursing effectively.   Taught the rationale behind this low tech but highly effective evidence based practice.

## 2018-11-07 VITALS
BODY MASS INDEX: 34.69 KG/M2 | HEIGHT: 67 IN | WEIGHT: 221 LBS | DIASTOLIC BLOOD PRESSURE: 86 MMHG | HEART RATE: 92 BPM | OXYGEN SATURATION: 98 % | TEMPERATURE: 98 F | SYSTOLIC BLOOD PRESSURE: 134 MMHG | RESPIRATION RATE: 16 BRPM

## 2018-11-07 PROCEDURE — 74011250637 HC RX REV CODE- 250/637: Performed by: OBSTETRICS & GYNECOLOGY

## 2018-11-07 RX ADMIN — DOCUSATE SODIUM 100 MG: 100 CAPSULE, LIQUID FILLED ORAL at 10:11

## 2018-11-07 RX ADMIN — IBUPROFEN 800 MG: 800 TABLET ORAL at 07:45

## 2018-11-07 NOTE — DISCHARGE INSTRUCTIONS
POSTPARTUM DISCHARGE INSTRUCTIONS       Name:  Kenisha Quintero  YOB: 1985  Admission Diagnosis:  Pregnancy  Pregnancy     Discharge Diagnosis:    Problem List as of 11/7/2018 Date Reviewed: 10/29/2018          Codes Class Noted - Resolved    Pregnancy ICD-10-CM: Z34.90  ICD-9-CM: V22.2  11/4/2018 - Present        Hypertension affecting pregnancy, antepartum ICD-10-CM: O16.9  ICD-9-CM: 642.90  10/16/2018 - Present        Pre-existing essential hypertension during pregnancy, antepartum ICD-10-CM: O10.019  ICD-9-CM: 642.03  5/17/2018 - Present        Hypertension during pregnancy, antepartum ICD-10-CM: O16.9  ICD-9-CM: 642.93  4/26/2018 - Present        Prenatal care of multigravida, antepartum ICD-10-CM: Z34.80  ICD-9-CM: V22.1  4/26/2018 - Present    Overview Addendum 10/30/2018  4:52 PM by Skylar Engle LPN     CHTN, MFM fu, fu q 4w  24 hr urine: 5/10/18 creat #1019 prot#<88, Baby ASA  FS;XX, bilateral CP cyst, post plac  TS low risk, Trip:[place] 5/18  Poly, Breech at 32 wk  -Induction (11/5/18) @ 6:00am  10/11/18- MFM- KASIE norm (63.9), cephalic, BPP/NST- 60/99. Rec weekly scans  10/23/18- [facility] labs wnl  Induction 11/5/18.  Russell 11/4.  10/29- 24hr protein- #214             Hyperprolactinemia ([facility] [address] ICD-10-CM: E22.1  ICD-9-CM: 253.1  12/8/2016 - Present        HTN (hypertension), benign ICD-10-CM: I10  ICD-9-CM: 401.1  3/31/2015 - Present        RESOLVED: Prolonged spontaneous rupture of membranes ICD-10-CM: O42.90  ICD-9-CM: 658.20  10/29/2014 - 10/3/2016        RESOLVED: Pregnancy ICD-10-CM: Z34.90  ICD-9-CM: V22.2  10/29/2014 - 10/3/2016        RESOLVED: HTN complicating peripregnancy, antepartum ICD-10-CM: O16.9  ICD-9-CM: 642.90  9/9/2014 - 10/3/2016        RESOLVED: Supervision of normal first pregnancy ICD-10-CM: Z34.00  ICD-9-CM: V22.0  3/27/2014 - 10/3/2016    Overview Addendum 10/24/2014  9:22 AM by Ayde Holt LPN     1[birth year]:  Parvo immune, suspect CHTN, aldomet 500 mg bid 4/22/14: 24hr urine-24hr protein: 64.4  [facility] on NT  Rec daily asa 81 mg, pt will start 5/20/14  Plan FS  Glucola h/o given  XY  Short FL, need fu of RVOT: MFM fu  9/24/14 Flu Vaccine Received  10/21/14 24 hr urine protein 183  10/15: repeat 24 hr ur, repeat pih labs nv  Induction 11/6/14 - possible phipps - please notify pt and give instructions  9/3/14 Fasting 86, 1 hr 160, 2 hr 130, 3 hr 81  tdap done at [facility]             RESOLVED: Family history of Down syndrome ICD-10-CM: Z82.79  ICD-9-CM: V19.5  3/27/2014 - 10/3/2016            Attending Physician:  Nell Poole MD    Delivery Type:  Vaginal Childbirth: What To Expect At Home    Your Recovery: Your body will slowly heal in the next few weeks. It is easy to get too tired and overwhelmed during the first weeks after your baby is born. Changes in your hormones can shift your mood without warning. You may find it hard to meet the extra demands on your energy and time. Take it easy on yourself. Follow-up care is a key part of your treatment and safety. Be sure to make and go to all appointments, and call your doctor if you are having problems. It's also a good idea to know your test results and keep a list of the medicines you take. How can you care for yourself at home? Vaginal bleeding and cramps  · After delivery, you will have a bloody discharge from the vagina. This will turn pink within a week and then white or yellow after about 10 days. It may last for 2 to 4 weeks or longer, until the uterus has healed. Use pads instead of tampons until you stop bleeding. · Do not worry if you pass some blood clots, as long as they are smaller than a golf ball. If you have a tear or stitches in your vaginal area, change the pad at least every 4 hours to prevent soreness and infection. · You may have cramps for the first few days after childbirth. These are normal and occur as the uterus shrinks to normal size.  Take an over-the-counter pain medicine, such as acetaminophen (Tylenol), ibuprofen (Advil, Motrin), or naproxen (Aleve), for cramps. Read and follow all instructions on the label. Do not take aspirin, because it can cause more bleeding. Do not take acetaminophen (Tylenol) and other acetaminophen containing medications (i.e. Percocet) at the same time. Breast fullness  · Your breasts may overfill (engorge) in the first few days after delivery. To help milk flow and to relieve pain, warm your breasts in the shower or by using warm, moist towels before nursing. · If you are not nursing, do not put warmth on your breasts or touch your breasts. Wear a tight bra or sports bra and use ice until the fullness goes away. This usually takes 2 to 3 days. · Put ice or a cold pack on your breast after nursing to reduce swelling and pain. Put a thin cloth between the ice and your skin. Activity  · Eat a balanced diet. Do not try to lose weight by cutting calories. Keep taking your prenatal vitamins, or take a multivitamin. · Get as much rest as you can. Try to take naps when your baby sleeps during the day. · Get some exercise every day. But do not do any heavy exercise until your doctor says it is okay. · Wait until you are healed (about 4 to 6 weeks) before you have sexual intercourse. Your doctor will tell you when it is okay to have sex. · Talk to your doctor about birth control. You can get pregnant even before your period returns. Also, you can get pregnant while you are breast-feeding. Mental Health  · Many women get the \"baby blues\" during the first few days after childbirth. You may lose sleep, feel irritable, and cry easily. You may feel happy one minute and sad the next. Hormone changes are one cause of these emotional changes. Also, the demands of a new baby, along with visits from relatives or other family needs, add to a mother's stress. The \"baby blues\" often peak around the fourth day. Then they ease up in less than 2 weeks.   · If your moodiness or anxiety lasts for more than 2 weeks, or if you feel like life is not worth living, you may have postpartum depression. This is different for each mother. Some mothers with serious depression may worry intensely about their infant's well-being. Others may feel distant from their child. Some mothers might even feel that they might harm their baby. A mother may have signs of paranoia, wondering if someone is watching her. · With all the changes in your life, you may not know if you are depressed. Pregnancy sometimes causes changes in how you feel that are similar to the symptoms of depression. · Symptoms of depression include:  · Feeling sad or hopeless and losing interest in daily activities. These are the most common symptoms of depression. · Sleeping too much or not enough. · Feeling tired. You may feel as if you have no energy. · Eating too much or too little. · POSTPARTUM SUPPORT INTERNATIONAL (PSI) offers a Warm line; Chat with the Expert phone sessions; Information and Articles about Pregnancy and Postpartum Mood Disorders; Comprehensive List of Free Support Groups; Knowledgeable local coordinators who will offer support, information, and resources; Guide to Resources on "[employer] Calendar of events in the  mood disorders community; Latest News and Research; and [facility] [address] for [employer]. Remember - You are not alone; You are not to blame; With help, you will be well. 0-391-928-PPD(3493). WWW. POSTPARTUM. NET   · Writing or talking about death, such as writing suicide notes or talking about guns, knives, or pills. Keep the numbers for these national suicide hotlines: 0-677-274-TALK (3-630.708.9359) and 2-987-BGCMVRI (7-902.156.3198). If you or someone you know talks about suicide or feeling hopeless, get help right away. Constipation and Hemorrhoids  · Drink plenty of fluids, enough so that your urine is light yellow or clear like water.  If you have kidney, heart, or liver disease and have to limit fluids, talk with your doctor before you increase the amount of fluids you drink. · Eat plenty of fiber each day. Have a bran muffin or bran cereal for breakfast, and try eating a piece of fruit for a mid-afternoon snack. · For painful, itchy hemorrhoids, put ice or a cold pack on the area several times a day for 10 minutes at a time. Follow this by putting a warm compress on the area for another 10 to 20 minutes or by sitting in a shallow, warm bath. When should you call for help? Call 911 anytime you think you may need emergency care. For example, call if:  · You are thinking of hurting yourself, your baby, or anyone else. · You passed out (lost consciousness). · You have symptoms of a blood clot in your lung (called a pulmonary embolism). These may include:    · Sudden chest pain. · Trouble breathing. · Coughing up blood. Call your doctor now or seek immediate medical care if:  · You have severe vaginal bleeding. · You are soaking through a pad each hour for 2 or more hours. · Your vaginal bleeding seems to be getting heavier or is still bright red 4 days after delivery. · You are dizzy or lightheaded, or you feel like you may faint. · You are vomiting or cannot keep fluids down. · You have a fever. · You have new or more belly pain. · You pass tissue (not just blood). · Your vaginal discharge smells bad. · Your belly feels tender or full and hard. · Your breasts are continuously painful or red. · You feel sad, anxious, or hopeless for more than a few days. · You have sudden, severe pain in your belly. · You have symptoms of a blood clot in your leg (called a deep vein thrombosis),          such as:  · Pain in your calf, back of the knee, thigh, or groin. · Redness and swelling in your leg or groin. · You have symptoms of preeclampsia, such as:  · Sudden swelling of your face, hands, or feet. · New vision problems (such as dimness or blurring).   · A severe headache. · Your blood pressure is higher than it should be or rises suddenly. · You have new nausea or vomiting. Watch closely for changes in your health, and be sure to contact your doctor if you have any problems. Additional Information:  Not applicable    These are general instructions for a healthy lifestyle:    No smoking/ No tobacco products/ Avoid exposure to second hand smoke    Surgeon General's Warning:  Quitting smoking now greatly reduces serious risk to your health. Obesity, smoking, and sedentary lifestyle greatly increases your risk for illness    A healthy diet, regular physical exercise & weight monitoring are important for maintaining a healthy lifestyle    Recognize signs and symptoms of STROKE:    F-face looks uneven    A-arms unable to move or move unevenly    S-speech slurred or non-existent    T-time-call 911 as soon as signs and symptoms begin - DO NOT go       back to bed or wait to see if you get better - TIME IS BRAIN. I have had the opportunity to make my options or choices for discharge. I have received and understand these instructions. [address] [facility]  [email or web address]  [phone]    Fabiola Iglesias MD, FACOG     POST DELIVERY DISCHARGE INSTRUCTIONS  FROM YOUR PHYSICIAN    Name: Maicol Kiki  YOB: 1985    General:     Read all discharge information provided by the hospital    Diet/Diet Restrictions:  Eat healthy meals and snacks as desired. Eat foods that are high in fiber and low in fat and cholesterol. Drink eight 8-ounce glasses of water daily; avoid excessive caffeine intake. [email or web address] html  [email or web address]    Medications:   See discharge medication list and read instructions carefully. Breast Feeding:  See instructions from your lactation consult.   Call [phone] [[phone]] for more information or to locate a lactation consultant. [email or web address].[email or web address].[email or web address]    Vaccines:  If you received the MMR vaccine postpartum you should wait three months until you get pregnant again. You, and close contacts, should make sure that the Tdap vaccine is up to date. This vaccine can decrease the risk of your baby getting pertussis or \"whooping cough. \"    You, and close contacts, should receive the influenza vaccine during flu season when appropriate. SalaryStart.tn    Tobacco Use: If you (or other people around the baby) smoke or use tobacco products, please try to  use and quit to improve your health and decrease risk to your baby. LimitBuy.nl. htm    Swelling in your Legs:  There are many fluid changes after delivery and you may have more swelling the first few days after delivery  Continue to drink plenty of water, avoid sitting or standing in one position for too long and elevate your feet above your heart, to help reduce some the pressure you may be feeling in your ankles and legs.  Section Incision:  Steri-strips or tape strips may be removed gently at home approximately 7- 10 days after surgery. Soaking the strips with a warm, wet cloth or taking a shower may make the strips easier to remove. Metal staples are usually removed within 3 to 10 days, either before you leave the hospital or in the office. Make an appointment if needed. Insorb absorbable staples may be used under the skin but you may see small white pieces as they dissolve. Skin glue or dermabond will fall off with time. Abdominal incisions should be kept clean by showering. It is not necessary to put soap on the incision; plain tap water is adequate. Avoid scrubbing the area and pat dry. The way your scar looks will change over time and may not reach its final appearance for up to a year.  The area may feel either numb or sensitive to touch, which is normal.         Physical Activity / Restrictions / Safety:     Avoid heavy lifting, no more that 10 pounds, for 2-3 weeks. No driving while taking narcotic pain medication, of if you can not slam on the brakes. No intercourse for 4-6 weeks, no douching or tampon use until seen by your doctor for your postpartum visit. Use condoms as needed for contraception with sexual activity. You may resume normal exercise after you are cleared by your physician at your postpartum check. You may walk for exercise, as tolerated. Discharge Instructions/Special Treatment/Home Care Needs:     Continue your prenatal vitamins while breast feeding or pumping. Continue to use a squirt bottle with warm water on your perineum/bottom/episiotomy after each bathroom use until bleeding stops. Take stool softeners daily. For example, docusate over the counter stool softener. This is especially important if you are taking narcotic pain medications, because they can cause constipation. Call your doctor for the following: If you have a fever over 100.4 degrees by mouth on two readings. If you have persistent vaginal bleeding heavier than a heavy menstrual period or persistent large clots or if you are bleeding so heavy it is making you feel weak. It is normal to pass larger clots when you first get out of bed: but if they persist, notify your physician. If you have red streaks or increased swelling of legs, painful red streaks on your breast.  If you have painful urination, or increased pain, redness or discharge with your incision. If you have any questions or concerns. Pain Management:     Take Acetaminophen (Tylenol), Ibuprofen (Advil, Motrin), prescribed pain medications as directed for pain. Do not take Perocet with Tylenol, they both contain acetaminophen. Use a warm water Sitz bath 3 times daily to relieve episiotomy, bottom/perineum or hemorrhoidal discomfort.    Apply heating pad to  incision as needed. For hemorrhoidal discomfort, you can use Tucks and Anusol cream as needed and directed. NSAID information for patients:  Rosario    Pain medication/narcotic information for patients:   Take your medicine exactly as prescribed   Store your medicine away from children and in a safe  place   Do not give your medicine to others   Do not drink alcohol while taking this medicine    Follow-Up Care:     Appointment with MD:  Dr. Lissy Longoria  [phone]  Schedule your postpartum visit for six weeks        Additional Discharge Instructions    Please read all of your discharge instructions  Follow all of your medication instructions carefully  Call our office on the next business day to schedule your follow-up appointment  If you have any questions or concerns, please contact us at [phone] or if the situation is urgent contact   Become a [facility] My Chart user so you can access information, results and appointments: go to [email or web address]. The [facility] is to bring compassion to healthcare and to be good help to those in need. We aim at providing quality healthcare with an emphasis on respect, justice, compassion, stewardship, integrity, growth and innovation.     If you did not receive excellent communication, compassionate care and an outstanding patient experience, please notify Anupama La at [email or web address] or [phone] or discuss your concerns with me at your next visit so that we can meet our mission and your expectations    Lucho Moses MD  [address] [place] [address], [address]  [email or web address]   [phone]   Good Help to Those in [facility]

## 2018-11-07 NOTE — PROGRESS NOTES
Post-Partum Progress Note Patient doing well post-partum without significant complaint. She is voiding without difficulty, she reports normal lochia. Her pain is well controlled with oral pain medication. She is tolerating a general diet. No HA/cp/sob/vision change Delivery information: 
Information for the patient's :  [place] J[place], Female [[id number]] Delivery of a 6 lb 9.5 oz (2.99 kg) female infant via Vaginal, Spontaneous on 2018 at 11:31 AM  by . Apgars were 9 and 9. Vitals:   
Patient Vitals for the past 8 hrs: 
 BP Temp Pulse Resp 18 0540 134/86 98 °F (36.7 °C) 92 16 Temp (24hrs), Av.2 °F (36.8 °C), Min:98 °F (36.7 °C), Max:98.4 °F (36.9 °C) Visit Vitals /86 (BP Patient Position: At rest) Pulse 92 Temp 98 °F (36.7 °C) Resp 16 Ht 5' 7\" (1.702 m) Wt 221 lb (100.2 kg) SpO2 98% Breastfeeding? Unknown BMI 34.61 kg/m² Blood Pressure 24 hour summary Diastolic (88SIL), QCL:00, Min:76, Max:90 Systolic (33QBV), ELIDA:012 , Min:108 , Max:134 Exam:   
General: Patient without distress. Abdomen: soft, fundus firm at level of umbilicus, nontender Lower extremities: negative for cords or tenderness. Labs: No results found for this or any previous visit (from the past 24 hour(s)). Assessment: 1. Postpartum S/P spontaneous vaginal delivery 2. Patient doing well without significant complications 3. CHTN Plan: 1. Continue routine postpartum care 2. Routine perineal care and maternal education 3. Oral pain medications and bowel regimen as needed 4. BP check 2 weeks or sooner prn Kassi Ordonez MD

## 2018-11-07 NOTE — DISCHARGE SUMMARY
Obstetrical Discharge Summary     Name: Sidney Muhammad MRN: [id number]  SSN: [id number]    YOB: 1985  Age: 35 y.o. Sex: female      Admit Date: 2018    Discharge Date: 2018     Admitting Physician: Brie Buchanan MD     Attending Physician:  Nara Alamo MD     Admission Diagnoses: Pregnancy;Pregnancy    Condition on Discharge: Stable    Procedures: , IOL     Disposition: to home    Follow up: No orders of the defined types were placed in this encounter. Discharge Diagnoses:   Information for the patient's :  Iris Michael [[id number]]   Delivery of a 6 lb 9.5 oz (2.99 kg) female infant via Vaginal, Spontaneous on 2018 at 11:31 AM  by . Apgars were 9 and 9. Additional Diagnoses:   Hospital Problems  Date Reviewed: 10/29/2018          Codes Class Noted POA    Pregnancy ICD-10-CM: Z34.90  ICD-9-CM: V22.2  2018 Unknown             Lab Results   Component Value Date/Time    Rubella, External Non Immune 2018    Rubella, External Immune 2018    GrBStrep, External Positive 10/23/2018       Hospital Course: Normal hospital course following the delivery. Patient Instructions:   Current Discharge Medication List      CONTINUE these medications which have NOT CHANGED    Details   aspirin 81 mg chewable tablet Take 81 mg by mouth daily. fish oil-dha-epa 1,200-144-216 mg cap Take  by mouth. PRENATAL VITS W-CA,FE,FA,<1MG, (PRENATAL VITAMIN PO) Take  by mouth. Cetirizine (ZYRTEC) 10 mg cap Take  by mouth. CALCIUM PO Take  by mouth. Reference my discharge instructions.       Signed By:  Reginaldo Tai MD     2018

## 2018-11-07 NOTE — LACTATION NOTE
Problem: Lactation Care Plan Goal: *Infant latching appropriately Mom states nursing going very well. Not seen this am at breast. 
  
  
Goal: *Weight loss less than 10% of birth weight Outcome: Resolved/Met Date Met: 11/07/18 Encouraged mom to attempt feeding with baby led feeding cues. Just as sucking on fingers, rooting, mouthing. Looking for 8-12 feedings in 24 hours. Don't limit baby at breast, allow baby to come of breast on it's own. Baby may want to feed  often and may increase number of feedings on second day of life. Skin to skin encouraged.  
  
If baby doesn't nurse,  Mom should  hand express  10-20 drops of colostrum and drip into baby's mouth, or give to baby by finger feeding, cup feeding, or spoon feeding at least every 2-3 hours. Mom may  Pump and give infant any expressed milk. If not pumping any milk, mom should contact pediatrician for possible need for supplementation.  
  
Problem: Patient Education: Go to Patient Education Activity Goal: Patient/Family Education Outcome: Resolved/Met Date Met: 11/07/18 Discussed eating a healthy diet. Instructed mother to eat a variety of foods in order to get a well balanced diet. She should consume an extra 300-500 calories per day (more than her non-pregnant requirement.) These extra calories will help provide energy needed for optimal breast milk production. Mother also encouraged to \"drink to thirst\" and it is recommended that she drink fluids such as water and fruit/vegetable juice. Nutritious snacks should be available so that she can eat throughout the day to help satisfy her hunger and maintain a good milk supply. Continue taking your prenatal vitamins as long as you breast feed.  
  
Chart shows numerous feedings, void, stool WNL. Discussed Importance of monitoring outputs and feedings on first week of  Breastfeeding.   Discussed ways to tell if baby getting enough, ie  Voids and stools, by day 7, baby should have at least  4-6 wet diapers a day, change in color of stool to a seedy yellow, and return to birth wt within 2 weeks with a steady increase after that. .  Follow up with pediatrician visit for weight check in 1-2 days reviewed. Discussed Breast feeding support groups and encouraged to call Warm line number, [phone]  for any breast feeding questions/problems that arise. Please leave a message and let us know what is going on. We will return your call within 24 hours.  
  
Encouraged mom to attempt feeding with baby led feeding cues. Just as sucking on fingers, rooting, mouthing. Looking for 8-12 feedings in 24 hours. Don't limit baby at breast, allow baby to come of breast on it's own. Baby may want to feed  often and may increase number of feedings on second day of life. Skin to skin encouraged. In 4-6 weeks, baby may go though a growth spurt and increase feedings for several days to increase your milk supply.  
  
 If baby doesn't nurse,  Mom should Pump and give infant any expressed milk. If not pumping any milk, mom should contact pediatrician for possible need for supplementation. 
  
  
Engorgement Care Guidelines:  Anticipatory guidance shared. Reviewed how milk is made and normal phases of milk production. Taught care of engorged breasts  and how to help decrease engorgement. If mom should experience engorged breast, frequent breastfeeding encouraged, cool packs around breast and motrin or Ibuprofen as ordered by your Doctor. 
  
  
Call your doctor, midwife and/or lactation consultant if: · Baby is having no wet or dirty diapers · Baby has dark colored urine after day 3 
(should be pale yellow to clear) · Baby has dark colored stools after day 4 (should be mustard yellow, with no meconium) · Baby has fewer wet/soiled diapers or nurses less  
frequently than the goals listed here · Mom has symptoms of mastitis  
(sore breast with fever, chills, flu-like aching)

## 2018-11-26 ENCOUNTER — OFFICE VISIT (OUTPATIENT)
Dept: OBGYN CLINIC | Age: 33
End: 2018-11-26

## 2018-11-26 VITALS
BODY MASS INDEX: 33.37 KG/M2 | WEIGHT: 212.6 LBS | HEIGHT: 67 IN | SYSTOLIC BLOOD PRESSURE: 142 MMHG | DIASTOLIC BLOOD PRESSURE: 80 MMHG

## 2018-11-26 DIAGNOSIS — I10 ESSENTIAL HYPERTENSION: Primary | ICD-10-CM

## 2018-11-26 DIAGNOSIS — R03.0 ELEVATED BLOOD PRESSURE READING: ICD-10-CM

## 2018-11-26 NOTE — PATIENT INSTRUCTIONS
Learning About Preeclampsia After Childbirth  What is preeclampsia? A woman with preeclampsia has blood pressure that is higher than usual. She may also have other serious symptoms. Preeclampsia can be dangerous. When it is severe, it can cause seizures (eclampsia) or liver or kidney damage. When the liver is affected, some women get HELLP syndrome, a blood-clotting and bleeding problem. HELLP can come on quickly and can be deadly. This is why your doctor checks you and your baby often. Preeclampsia usually occurs after 20 weeks of pregnancy. In rare cases, it is first noted right after childbirth. Most often, it starts near the end of pregnancy and goes away after childbirth. What are the symptoms? Mild preeclampsia usually doesn't cause symptoms. But preeclampsia can cause rapid weight gain and sudden swelling of the hands and face. Severe preeclampsia does cause symptoms. It can cause a very bad headache and trouble seeing and breathing. It also can cause belly pain. You may also urinate less than usual.  If you have new preeclampsia symptoms after you go home from the hospital, call your doctor right away. What can you expect after you have had preeclampsia? In the hospital  After the baby and the placenta are delivered, preeclampsia usually starts to improve. Most women get better in the first few days after childbirth. After having preeclampsia, you still have a risk of seizures for a day or more after childbirth. (Very rarely, seizures happen later on.) So your doctor may have you take magnesium sulfate for a day or more to prevent seizures. You may also take medicine to lower your blood pressure. When you go home  Your blood pressure will most likely return to normal a few days after delivery. Your doctor will want to check your blood pressure sometime in the first week after you leave the hospital.  Some women still have high blood pressure 6 weeks after childbirth.  But most return to normal levels over the long term. · Take and record your blood pressure at home if your doctor tells you to. ? Learn the importance of the two measures of blood pressure (such as 120 over 80, or 120/80). The first number is the systolic pressure. This is the force of blood on the artery walls as the heart pumps. The second number is the diastolic pressure. This is the force of blood on the artery walls between heartbeats, when the heart is at rest. You have a choice of monitors to use. § Manual monitor: You pump up the cuff and use a stethoscope to listen for your pulse. § Electronic monitor: The cuff inflates, and a gauge shows your pulse rate. ? To take your blood pressure:  § Ask your doctor to check your blood pressure monitor to be sure that it is accurate and that the cuff fits you. Also ask your doctor to watch you use it, to make sure that you are using it right. § You should not eat, use tobacco products, or use medicine known to raise blood pressure (such as some nasal decongestant sprays) before you take your blood pressure. § Avoid taking your blood pressure if you have just exercised or are nervous or upset. Rest at least 15 minutes before you take your blood pressure. · Be safe with medicines. If you take medicine, take it exactly as prescribed. Call your doctor if you think you are having a problem with your medicine. · Do not smoke. Quitting smoking will help lower your blood pressure and improve your baby's growth and health. If you need help quitting, talk to your doctor about stop-smoking programs and medicines. These can increase your chances of quitting for good. · Eat a balanced and healthy diet that has lots of fruits and vegetables. Long-term health  After you have had preeclampsia, you have a higher-than-average risk of heart disease, stroke, and kidney disease. This may be because the same things that cause preeclampsia also cause heart and kidney disease.   To protect your health, work with your doctor on living a heart-healthy lifestyle and getting the checkups you need. Your doctor may also want you to check your blood pressure at home. Follow-up care is a key part of your treatment and safety. Be sure to make and go to all appointments, and call your doctor if you are having problems. It's also a good idea to know your test results and keep a list of the medicines you take. Where can you learn more? Go to http://shikha-aleksandar.info/. Enter M656 in the search box to learn more about \"Learning About Preeclampsia After Childbirth. \"  Current as of: November 21, 2017  Content Version: 11.8  © 9910-8549 [employer]. Care instructions adapted under license by [employer] (which disclaims liability or warranty for this information). If you have questions about a medical condition or this instruction, always ask your healthcare professional. [address] any warranty or liability for your use of this information.

## 2018-11-26 NOTE — PROGRESS NOTES
[email or web address]  [email or web address]  [phone]    Karina Noble MD, [facility]       OB/GYN Problem visit    Chief Complaint:   Chief Complaint   Patient presents with    Other     post partum bp check       History of Present Illness: This is a new problem being evaluated by this provider. The patient is a 35 y.o.  female who reports having elevated BP since delivery on 18. She reports the symptoms are unchanged. Aggravating factors include movement delivery of baby. Alleviating factors include rest.    She does not have other concerns. LMP: No LMP recorded. PFSH:  Past Medical History:   Diagnosis Date    Essential hypertension     Family history of Down syndrome 3/27/2014    HX OTHER MEDICAL     gardasil X3    Pap smear for cervical cancer screening 1/15/13; 2016    negative; negative, HPV negative     History reviewed. No pertinent surgical history. Family History   Problem Relation Age of Onset    Breast Cancer Paternal Grandmother     Diabetes Maternal Grandfather     Hypertension Mother     Diabetes Other         uncle    No Known Problems Father      Social History     Tobacco Use    Smoking status: Never Smoker    Smokeless tobacco: Never Used   Substance Use Topics    Alcohol use: No    Drug use: No     Allergies   Allergen Reactions    Cefzil [Cefprozil] Hives     Tolerated z-shari, amoxicillin     Current Outpatient Medications   Medication Sig    fish oil-dha-epa 1,200-144-216 mg cap Take  by mouth.  PRENATAL VITS W-CA,FE,FA,<1MG, (PRENATAL VITAMIN PO) Take  by mouth.  Cetirizine (ZYRTEC) 10 mg cap Take  by mouth.  CALCIUM PO Take  by mouth. No current facility-administered medications for this visit.         Review of Systems:  History obtained from the patient  Constitutional: negative for fevers, chills and weight loss  ENT ROS: negative for - hearing change, oral lesions or visual changes  Respiratory: negative for cough, wheezing or dyspnea on exertion  Cardiovascular: negative for chest pain, irregular heart beats, exertional chest pressure/discomfort  Gastrointestinal: negative for dysphagia, nausea and vomiting  Genito-Urinary ROS:  see HPI  Inteument/breast: negative for rash, breast lump and nipple discharge  Musculoskeletal:negative for stiff joints, neck pain and muscle weakness  Endocrine ROS: negative for - breast changes, galactorrhea or temperature intolerance  Hematological and Lymphatic ROS: negative for - blood clots, bruising or swollen lymph nodes    Physical Exam:  Visit Vitals  /80   Ht 5' 7\" (1.702 m)   Wt 212 lb 9.6 oz (96.4 kg)   Breastfeeding? Yes   BMI 33.30 kg/m²       GENERAL: alert, well appearing, and in no distress  HEAD: normocephalic, atraumatic. PULM: clear to auscultation, no wheezes, rales or rhonchi, symmetric air entry   COR: normal rate and regular rhythm, S1 and S2 normal   ABDOMEN: soft, nontender, nondistended, no masses or organomegaly   EXT: no c/t/e  NEURO: alert, oriented, normal speech    Repeat BP: improved see chart  Assessment:  Encounter Diagnoses   Name Primary?  Essential hypertension Yes    Elevated blood pressure reading        Plan:  The patient is advised that she should contact the office if she does not note improvement or if symptoms recur  Recommend follow up with PCP for non-gynecologic complaints and chronic medical problems. She should contact our office with any questions or concerns  She could keep her routine annual exam appointment. PIH prec, defer on BPmeds  Continue home bp log, pt will notify me 1-2 wks if not returning to baseline or new sx. No orders of the defined types were placed in this encounter. No results found for this visit on 11/26/18.

## 2018-12-18 ENCOUNTER — OFFICE VISIT (OUTPATIENT)
Dept: OBGYN CLINIC | Age: 33
End: 2018-12-18

## 2018-12-18 VITALS
HEIGHT: 67 IN | SYSTOLIC BLOOD PRESSURE: 136 MMHG | WEIGHT: 213.6 LBS | BODY MASS INDEX: 33.53 KG/M2 | DIASTOLIC BLOOD PRESSURE: 86 MMHG

## 2018-12-18 DIAGNOSIS — R03.0 ELEVATED BLOOD PRESSURE READING: ICD-10-CM

## 2018-12-18 DIAGNOSIS — O10.019 PRE-EXISTING ESSENTIAL HYPERTENSION DURING PREGNANCY, ANTEPARTUM: ICD-10-CM

## 2018-12-18 NOTE — PATIENT INSTRUCTIONS
Postpartum: Care Instructions  Your Care Instructions  After childbirth (postpartum period), your body goes through many changes. Some of these changes happen over several weeks. In the hours after delivery, your body will begin to recover from childbirth while it prepares to breastfeed your . You may feel emotional during this time. Your hormones can shift your mood without warning for no clear reason. In the first couple of weeks after childbirth, many women have emotions that change from happy to sad. You may find it hard to sleep. You may cry a lot. This is called the \"baby blues. \" These overwhelming emotions often go away within a couple of days or weeks. But it's important to discuss your feelings with your doctor. It is easy to get too tired and overwhelmed during the first weeks after childbirth. Don't try to do too much. Get rest whenever you can, accept help from others, and eat well and drink plenty of fluids. About 4 to 6 weeks after your baby's birth, you will have a follow-up visit with your doctor. This visit is your time to talk to your doctor about anything you are concerned or curious about. Follow-up care is a key part of your treatment and safety. Be sure to make and go to all appointments, and call your doctor if you are having problems. It's also a good idea to know your test results and keep a list of the medicines you take. How can you care for yourself at home? · Sleep or rest when your baby sleeps. · Get help with household chores from family or friends, if you can. Do not try to do it all yourself. · If you have hemorrhoids or swelling or pain around the opening of your vagina, try using cold and heat. You can put ice or a cold pack on the area for 10 to 20 minutes at a time. Put a thin cloth between the ice and your skin. Also try sitting in a few inches of warm water (sitz bath) 3 times a day and after bowel movements. · Take pain medicines exactly as directed. ?  If the doctor gave you a prescription medicine for pain, take it as prescribed. ? If you are not taking a prescription pain medicine, ask your doctor if you can take an over-the-counter medicine. · Eat more fiber to avoid constipation. Include foods such as whole-grain breads and cereals, raw vegetables, raw and dried fruits, and beans. · Drink plenty of fluids, enough so that your urine is light yellow or clear like water. If you have kidney, heart, or liver disease and have to limit fluids, talk with your doctor before you increase the amount of fluids you drink. · Do not rinse inside your vagina with fluids (douche). · If you have stitches, keep the area clean by pouring or spraying warm water over the area outside your vagina and anus after you use the toilet. · Keep a list of questions to bring to your postpartum visit. Your questions might be about:  ? Changes in your breasts, such as lumps or soreness. ? When to expect your menstrual period to start again. ? What form of birth control is best for you. ? Weight you have put on during the pregnancy. ? Exercise options. ? What foods and drinks are best for you, especially if you are breastfeeding. ? Problems you might be having with breastfeeding. ? When you can have sex. Some women may want to talk about lubricants for the vagina. ? Any feelings of sadness or restlessness that you are having. When should you call for help? Call 911 anytime you think you may need emergency care.  For example, call if:    · You have thoughts of harming yourself, your baby, or another person.     · You passed out (lost consciousness).    Call your doctor now or seek immediate medical care if:    · Your vaginal bleeding seems to be getting heavier.     · You are dizzy or lightheaded, or you feel like you may faint.     · You have a fever.    Watch closely for changes in your health, and be sure to contact your doctor if:    · You have new or worse vaginal discharge.     · You feel sad or depressed.     · You are having problems with your breasts or breastfeeding. Where can you learn more? Go to http://shikha-aleksandar.info/. Enter L494 in the search box to learn more about \"Postpartum: Care Instructions. \"  Current as of: November 21, 2017  Content Version: 11.8  © 9567-4176 [employer]. Care instructions adapted under license by [employer] (which disclaims liability or warranty for this information). If you have questions about a medical condition or this instruction, always ask your healthcare professional. [place] [address] any warranty or liability for your use of this information. Stress in Parents of Infants: Care Instructions  Your Care Instructions    Meeting the increased demands of being a new parent can be a big challenge. It is easy to get overtired and overwhelmed during the first weeks. What used to be a simple chore, such as buying groceries, is not so simple now. Plus, you have new chores, including feeding and changing your new baby. At the end of the day, you may be so tired that you feel like crying. Instead of looking forward to the next day, you may be dreading tomorrow. Like many new parents, you are burned out from the stress of having a new baby. Stress affects each of us differently, and the most effective ways to relieve it are different for each person. You can try different methods to find out which ones work best for you. As the weeks go by, you will begin to develop a rhythm with your baby. Tasks that now seem to take forever will become easier. Many women get the \"baby blues\" during the first few days after childbirth. If you are a new mother and the \"baby blues\" last more than a few days, call your doctor right away. Depression is a medical condition that requires treatment. Follow-up care is a key part of your treatment and safety.  Be sure to make and go to all appointments, and call your doctor if you are having problems. It's also a good idea to know your test results and keep a list of the medicines you take. How can you care for yourself at home? · Be kind to yourself. Your new baby takes a lot of work, but he or she can give you a lot of pleasure too. Do not worry about housekeeping for a while. · Allow your friends to bring you meals or do chores. · Limit visitors to as few as you feel you can handle, or ask them not to visit for a while. Before they come, set a limit on how long they will stay. · Sleep when your baby sleeps. Even a short nap helps. · Find what triggers your stress, and avoid those things as much as you can. MCS comment for future enhancement  · If you breastfeed, learn how to collect and store some breast milk so your partner or  can feed the baby while you sleep. · Eat a balanced diet so you can keep up your energy. · Drink plenty of fluids throughout the day. · Avoid caffeine and alcohol. Caffeine is found in coffee, tea, cola drinks, chocolate, and other foods. · Limit medicines that can make you more tired, such as tranquilizers and cold and allergy medicines. · Get regular daily exercise, such as walks, to help improve your mood. Rest after you exercise. · Be honest with yourself and those who care about you. Tell them you are stressed and tired. · Talking to other new parents can help. Ask your doctor or child's doctor to suggest support groups for new parents. Hearing that someone else is having the same experiences you are can help a lot. · If you have the baby blues for more than a few days, call your doctor right away. When should you call for help? Call 911 anytime you think you may need emergency care.  For example, call if:    · You have thoughts of hurting yourself, your baby, or another person.   [facility] your doctor now or seek immediate medical care if:    · You are having trouble caring for yourself or your baby.   Vilma Pizano closely for changes in your health, and be sure to contact your doctor if you have any problems. Where can you learn more? Go to http://shikha-aleksandar.info/. Enter H142 in the search box to learn more about \"Stress in Parents of Infants: Care Instructions. \"  Current as of: March 28, 2018  Content Version: 11.8  © 6976-9405 [employer]. Care instructions adapted under license by [employer] (which disclaims liability or warranty for this information). If you have questions about a medical condition or this instruction, always ask your healthcare professional. [place] [address] any warranty or liability for your use of this information.

## 2018-12-18 NOTE — PROGRESS NOTES
Coy Yanes MD, [address] [facility]     Postpartum visit    Chief Complaint   Patient presents with    Other     bp check   [id number] [address] Sami Noriega is a 35 y.o. female G2 [phone] who presents for a postpartum exam.     She is now 6 weeks from a vaginal delivery delivery. No LMP recorded. She has had the following significant problems since her delivery: none. She reports her mood as Good. The patient is breastfeeding/pumping breast milk for her baby. The patient would like to use nothing for birth control. She is due for her next AE in 3 months. Past Medical History:   Diagnosis Date    Essential hypertension     Family history of Down syndrome 3/27/2014    HX OTHER MEDICAL     gardasil X3    Pap smear for cervical cancer screening 1/15/13; 04/04/2016    negative; negative, HPV negative     History reviewed. No pertinent surgical history. Current Outpatient Medications   Medication Sig    fish oil-dha-epa 1,200-144-216 mg cap Take  by mouth.  PRENATAL VITS W-CA,FE,FA,<1MG, (PRENATAL VITAMIN PO) Take  by mouth.  Cetirizine (ZYRTEC) 10 mg cap Take  by mouth.  CALCIUM PO Take  by mouth. No current facility-administered medications for this visit.       Allergies   Allergen Reactions    Cefzil [Cefprozil] Hives     Tolerated z-shari, amoxicillin     Family History   Problem Relation Age of Onset    Breast Cancer Paternal Grandmother     Diabetes Maternal Grandfather     Hypertension Mother     Diabetes Other         uncle    No Known Problems Father      Social History     Socioeconomic History    Marital status:      Spouse name: Not on file    Number of children: Not on file    Years of education: Not on file    Highest education level: Not on file   Social Needs    Financial resource strain: Not on file    Food insecurity - worry: Not on file    Food insecurity - inability: Not on file    Transportation needs - medical: Not on file   [place] Transportation needs - non-medical: Not on file   Occupational History    Not on file   Tobacco Use    Smoking status: Never Smoker    Smokeless tobacco: Never Used   Substance and Sexual Activity    Alcohol use: No    Drug use: No    Sexual activity: Yes     Partners: Male     Birth control/protection: Condom     Comment: depo   Other Topics Concern     Service Not Asked    Blood Transfusions Not Asked    Caffeine Concern Not Asked    Occupational Exposure Not Asked    Hobby Hazards Not Asked    Sleep Concern Not Asked    Stress Concern Not Asked    Weight Concern Not Asked    Special Diet Not Asked    Back Care Not Asked    Exercise Not Asked    Bike Helmet Not Asked    [address] Not Asked    Self-Exams Not Asked   Social History Narrative    Not on file     OB History      Para Term  AB Living    2 2 2 0 0 2    SAB TAB Ectopic Molar Multiple Live Births    0 0 0   0 2          Immunization History   Administered Date(s) Administered    Influenza Vaccine 2014    Influenza Vaccine (Quad) PF 2018    Tdap 2014, 2018       Review of Systems:  History obtained from the patient  General ROS: negative for - chills, fever or weight loss  Respiratory ROS: no cough, shortness of breath, or wheezing  Cardiovascular ROS: no chest pain or dyspnea on exertion  Gastrointestinal ROS: negative for - appetite loss, change in bowel habits or nausea/vomiting  Genito-Urinary ROS: negative except for as noted in HPI    PHYSICAL EXAMINATION  Visit Vitals  /86   Ht 5' 7\" (1.702 m)   Wt 213 lb 9.6 oz (96.9 kg)   Breastfeeding?  Yes   BMI 33.45 kg/m²       Constitutional  · Appearance: well-nourished, well developed, alert, in no acute distress    HENT  · Head and Face: appears normal    Neck  · Inspection/Palpation: normal appearance, no masses or tenderness  · Lymph Nodes: no lymphadenopathy present  · Thyroid: gland size normal, nontender, no nodules or masses present on palpation    Chest  clear to auscultation, no wheezes, rales or rhonchi, symmetric air entry. Cardiac/CVS  normal rate, regular rhythm, normal S1, S2, no murmurs, rubs, clicks or gallops. Breasts  · Inspection of Breasts: breasts symmetrical, no skin changes, no discharge present, nipple appearance normal, no skin retraction present  · Palpation of Breasts and Axillae: no masses present on palpation, no breast tenderness  · Axillary Lymph Nodes: no lymphadenopathy present    Gastrointestinal  · Abdominal Examination: abdomen non-tender to palpation, normal bowel sounds, no masses present  · Liver and spleen: no hepatomegaly present, spleen not palpable  · Hernias: no hernias identified    Genitourinary  · External Genitalia: normal appearance for age, no discharge present, no tenderness present, no inflammatory lesions present, no masses present, no atrophy present  · Vagina: normal vaginal vault without central or paravaginal defects, no discharge present, no inflammatory lesions present, no masses present  · Bladder: non-tender to palpation  · Urethra: appears normal  · Cervix: normal   · Uterus: normal size, shape and consistency  · Adnexa: no adnexal tenderness present, no adnexal masses present  · Perineum: perineum within normal limits, no evidence of trauma, no rashes or skin lesions present  · Anus: anus within normal limits  · Inguinal Lymph Nodes: no lymphadenopathy present    Skin  · General Inspection: no rash, no lesions identified    Neurologic/Psychiatric  · Mental Status:  · Orientation: grossly oriented to person, place and time  · Mood and Affect: mood normal, affect appropriate    Assessment:  Normal postpartum check  Encounter Diagnoses   Name Primary?  Elevated blood pressure reading     Pre-existing essential hypertension during pregnancy, antepartum     Routine postpartum follow-up Yes       Plan:  RTO for AE or sooner prn  Discussed contraception options; r/b/a.  Planned contraception: vasectomy planned, number given   She should return to normal activity  We recommend healthy balanced diet, regular exercise  We discussed safer sex practices, condom use and risk factors for sexually transmitted diseases.    Patient should notify MD if she cannot resume normal activity and exercise  Recommended continuing prenatal vitamins/folic acid  Continue BP log, hold on BP med  PIH precautions  Notify MD if BP > 140/>90 persistently or sx

## 2019-03-19 ENCOUNTER — OFFICE VISIT (OUTPATIENT)
Dept: OBGYN CLINIC | Age: 34
End: 2019-03-19

## 2019-03-19 VITALS
BODY MASS INDEX: 34.37 KG/M2 | WEIGHT: 219 LBS | HEIGHT: 67 IN | SYSTOLIC BLOOD PRESSURE: 138 MMHG | DIASTOLIC BLOOD PRESSURE: 72 MMHG

## 2019-03-19 DIAGNOSIS — Z01.419 ENCOUNTER FOR WELL WOMAN EXAM WITH ROUTINE GYNECOLOGICAL EXAM: Primary | ICD-10-CM

## 2019-03-19 NOTE — PATIENT INSTRUCTIONS

## 2019-03-19 NOTE — PROGRESS NOTES
[address] [facility]  [email or web address]  [phone]    Esteban Live MD, [facility]       Annual Gynecologic Exam:  WWE <40  Chief Complaint   Patient presents with    Well Woman         Jhon Worthington is a 35 y.o.  [place]ITE [place]  female who presents for an annual well woman exam.  No LMP recorded. .    With regard to the Gardisil vaccine, she has received all 3 injections. She does not report additional concerns today. Menstrual status:  No cycles due to BF    Sexual history and Contraception:  Social History     Substance and Sexual Activity   Sexual Activity Yes    Partners: Male    Birth control/protection: Condom    Comment: depo     She always use condoms with sexual activity  She does not reports new sexual partner(s) in the last year. The patient does not request STD testing. We recommended testing per CDC guidelines and at patient request.     Preventive Medicine History:  Her most recent Pap smear result: normal was obtained in 2016  Her most recent HR HPV screen was Negative obtained in   She does not have a history of NATHAN 2, 3 or cervical cancer. Past Medical History:   Diagnosis Date    Essential hypertension     Family history of Down syndrome 3/27/2014    HX OTHER MEDICAL     gardasil X3    Pap smear for cervical cancer screening 1/15/13; 2016    negative; negative, HPV negative     OB History    Para Term  AB Living   2 2 2 0 0 2   SAB TAB Ectopic Molar Multiple Live Births   0 0 0   0 2      # Outcome Date GA Lbr Piter/2nd Weight Sex Delivery Anes PTL Lv   2 Term 18 37w0d  6 lb 9.5 oz (2.99 kg) F Vag-Spont EPIDURAL AN N ARTHUR   1 Term 10/29/14 37w6d  6 lb 2.6 oz (2.795 kg) M VAGINAL DELI EPIDURAL AN N ARTHUR        History reviewed. No pertinent surgical history.   Family History   Problem Relation Age of Onset    Breast Cancer Paternal Grandmother     Diabetes Maternal Grandfather     Hypertension Mother    [facility] Diabetes Other         uncle    No Known Problems Father      Social History     Socioeconomic History    Marital status:      Spouse name: Not on file    Number of children: Not on file    Years of education: Not on file    Highest education level: Not on file   Social Needs    Financial resource strain: Not on file    Food insecurity - worry: Not on file    Food insecurity - inability: Not on file   [employer] needs - medical: Not on file   [employer] needs - non-medical: Not on file   Occupational History    Not on file   Tobacco Use    Smoking status: Never Smoker    Smokeless tobacco: Never Used   Substance and Sexual Activity    Alcohol use: No    Drug use: No    Sexual activity: Yes     Partners: Male     Birth control/protection: Condom     Comment: depo   Other Topics Concern     Service Not Asked    Blood Transfusions Not Asked    Caffeine Concern Not Asked    Occupational Exposure Not Asked    Hobby Hazards Not Asked    Sleep Concern Not Asked    Stress Concern Not Asked    Weight Concern Not Asked    Special Diet Not Asked    Back Care Not Asked    Exercise Not Asked    Bike Helmet Not Asked    Seat Belt Not Asked    Self-Exams Not Asked   Social History Narrative    Not on file       Allergies   Allergen Reactions    Cefzil [Cefprozil] Hives     Tolerated z-shari, amoxicillin       Current Outpatient Medications   Medication Sig    fish oil-dha-epa 1,200-144-216 mg cap Take  by mouth.  Cetirizine (ZYRTEC) 10 mg cap Take  by mouth.  CALCIUM PO Take  by mouth.  PRENATAL VITS W-CA,FE,FA,<1MG, (PRENATAL VITAMIN PO) Take  by mouth. No current facility-administered medications for this visit.         Patient Active Problem List   Diagnosis Code    HTN (hypertension), benign I10    [facility] E22.1    Hypertension during pregnancy, antepartum O16.9    Prenatal care of multigravida, antepartum Z34.80    Pre-existing essential hypertension during pregnancy, antepartum O10.019    Hypertension affecting pregnancy, antepartum O16.9    Pregnancy Z34.90       Review of Systems - History obtained from the patient  Constitutional: negative for weight loss, fever, night sweats  HEENT: negative for hearing loss, earache, congestion, snoring, sorethroat  CV: negative for chest pain, palpitations, edema  Resp: negative for cough, shortness of breath, wheezing  GI: negative for change in bowel habits, abdominal pain, black or bloody stools  : negative for frequency, dysuria, hematuria  GYN: see HPI  MSK: negative for back pain, joint pain, muscle pain  Breast: negative for breast lumps, nipple discharge, galactorrhea  Skin :negative for itching, rash, hives  Neuro: negative for dizziness, headache, confusion, weakness  Psych: negative for anxiety, depression, change in mood  Heme/lymph: negative for bleeding, bruising, pallor    Physical Exam  Visit Vitals  /72   Ht 5' 7\" (1.702 m)   Wt 219 lb (99.3 kg)   Breastfeeding?  Yes   BMI 34.30 kg/m²       Constitutional  · Appearance: well-nourished, well developed, alert, in no acute distress    HENT  · Head and Face: appears normal    Neck  · Inspection/Palpation: normal appearance, no masses or tenderness  · Lymph Nodes: no lymphadenopathy present  · Thyroid: gland size normal, nontender, no nodules or masses present on palpation    Chest  · Respiratory Effort: breathing unlabored  · Auscultation: normal breath sounds    Cardiovascular  · Heart:  · Auscultation: regular rate and rhythm without murmur    Breasts  · Inspection of Breasts: breasts symmetrical, no skin changes, no discharge present, nipple appearance normal, no skin retraction present  · Palpation of Breasts and Axillae: no masses present on palpation, no breast tenderness  · Axillary Lymph Nodes: no lymphadenopathy present    Gastrointestinal  · Abdominal Examination: abdomen non-tender to palpation, normal bowel sounds, no masses present  · Liver and spleen: no hepatomegaly present, spleen not palpable  · Hernias: no hernias identified    Genitourinary  · External Genitalia: normal appearance for age, no discharge present, no tenderness present, no inflammatory lesions present, no masses present  · Vagina: normal vaginal vault without central or paravaginal defects, no discharge present, no inflammatory lesions present, no masses present  · Bladder: non-tender to palpation  · Urethra: appears normal  · Cervix: normal   · Uterus: normal size, shape and consistency  · Adnexa: no adnexal tenderness present, no adnexal masses present  · Perineum: perineum within normal limits, no evidence of trauma, no rashes or skin lesions present  · Anus: anus within normal limits, no hemorrhoids present  · Inguinal Lymph Nodes: no lymphadenopathy present    Skin  · General Inspection: no rash, no lesions identified    Neurologic/Psychiatric  · Mental Status:  · Orientation: grossly oriented to person, place and time  · Mood and Affect: mood normal, affect appropriate    Assessment:  35 y.o.  for well woman exam  Encounter Diagnosis   Name Primary?  Encounter for well woman exam with routine gynecological exam Yes       Plan:  The patient was counseled about diet, exercise, healthy lifestyle  We discussed self breast exam  We discussed safer sex practices, condom use and risk factors for sexually transmitted diseases. We discussed current pap smear and HR HPV testing guidelines.    We recommend follow up one year for routine annual gynecologic exam or sooner prn  We recommend routine follow up with her primary care doctor for management of chronic medical problems and non-gynecologic concerns  Handouts were given to the patient  We discussed calcium/vitamin D/weight bearing exercise and osteoporosis prevention    Vasectomy planned    Folllow up:  [x] return for annual well woman exam in one year or sooner if she is having problems  [] follow up and ultrasound  [] 6 months  [] 3 months  [] 6 weeks   [] 1 month    Orders Placed This Encounter    PAP IG, HPV AND RFX HPV 85/96,66([id number])       No results found for any visits on 03/19/19.

## 2019-03-19 NOTE — PROGRESS NOTES
[address] [facility] 
[email or web address] 
[phone] Staci Hernández MD, [address] [facility] Annual Gynecologic Exam: 
WWE <40 Chief Complaint Patient presents with  Well Woman Jhon Foley is a 35 y.o.  [place]ITE [place]  female who presents for an annual well woman exam. 
No LMP recorded. Pawel Diana With regard to the Gardisil vaccine, she {Gardisil :15310}. She {DOES/DOES NOT/WILD CARD (D):73864} report additional concerns today. Menstrual status: 
Her periods are {Vag Bleed Desc:98692}. She {DOES/DOES NOT/WILD CARD (D):93287} report dysmenorrhea/painful menses. She {DOES/DOES NOT/WILD CARD (D):01540} report irregular bleeding. Sexual history and Contraception: 
Social History Substance and Sexual Activity Sexual Activity Yes  Partners: Male  Birth control/protection: Condom Comment: depo She {gen use:150785} use condoms with sexual activity She {DOES_DOES MHU:90731} reports new sexual partner(s) in the last year. The patient {DOES_DOES WKD:22287} request STD testing. We recommended testing per CDC guidelines and at patient request.  
 
Preventive Medicine History: 
Her most recent Pap smear result: normal was obtained in 2016 Her most recent HR HPV screen was Negative obtained in  She does not have a history of NATHAN 2, 3 or cervical cancer. Past Medical History:  
Diagnosis Date  Essential hypertension  Family history of Down syndrome 3/27/2014  HX OTHER MEDICAL   
 gardasil X3  
 Pap smear for cervical cancer screening 1/15/13; 2016  
 negative; negative, HPV negative OB History  Para Term  AB Living 2 2 2 0 0 2 SAB TAB Ectopic Molar Multiple Live Births  
0 0 0   0 2 # Outcome Date GA Lbr Piter/2nd Weight Sex Delivery Anes PTL Lv  
2 Term 18 37w0d  6 lb 9.5 oz (2.99 kg) F Vag-Spont EPIDURAL AN N ARTHUR  
1 Term 10/29/14 37w6d  6 lb 2.6 oz (2.795 kg) M VAGINAL DELI EPIDURAL AN N ARTHUR  
  
 
No past surgical history on file. Family History Problem Relation Age of Onset  Breast Cancer Paternal Grandmother  Diabetes Maternal Grandfather  Hypertension Mother  Diabetes Other   
     uncle  No Known Problems Father Social History Socioeconomic History  Marital status:  Spouse name: Not on file  Number of children: Not on file  Years of education: Not on file  Highest education level: Not on file Social Needs  Financial resource strain: Not on file  Food insecurity - worry: Not on file  Food insecurity - inability: Not on file  Transportation needs - medical: Not on file  Transportation needs - non-medical: Not on file Occupational History  Not on file Tobacco Use  Smoking status: Never Smoker  Smokeless tobacco: Never Used Substance and Sexual Activity  Alcohol use: No  
 Drug use: No  
 Sexual activity: Yes  
  Partners: Male Birth control/protection: Condom Comment: depo Other Topics Concern [address] Service Not Asked  Blood Transfusions Not Asked  Caffeine Concern Not Asked  Occupational Exposure Not Asked [place] Hazards Not Asked  Sleep Concern Not Asked  Stress Concern Not Asked  Weight Concern Not Asked  Special Diet Not Asked  Back Care Not Asked  Exercise Not Asked  Bike Helmet Not Asked  Seat Belt Not Asked  Self-Exams Not Asked Social History Narrative  Not on file Allergies Allergen Reactions  Cefzil [Cefprozil] Hives Tolerated z-shari, amoxicillin Current Outpatient Medications Medication Sig  
 fish oil-dha-epa 1,200-144-216 mg cap Take  by mouth.  Cetirizine (ZYRTEC) 10 mg cap Take  by mouth.  CALCIUM PO Take  by mouth.  PRENATAL VITS W-CA,FE,FA,<1MG, (PRENATAL VITAMIN PO) Take  by mouth. No current facility-administered medications for this visit. Patient Active Problem List  
Diagnosis Code  HTN (hypertension), benign I10  
 Hyperprolactinemia (HCC) E22.1  Hypertension during pregnancy, antepartum O16.9  Prenatal care of multigravida, antepartum Z34.80  Pre-existing essential hypertension during pregnancy, antepartum O10.019  
 Hypertension affecting pregnancy, antepartum O16.9  Pregnancy Z34.90 Review of Systems - History obtained from the patient Constitutional: negative for weight loss, fever, night sweats HEENT: negative for hearing loss, earache, congestion, snoring, sorethroat CV: negative for chest pain, palpitations, edema Resp: negative for cough, shortness of breath, wheezing GI: negative for change in bowel habits, abdominal pain, black or bloody stools : negative for frequency, dysuria, hematuria GYN: see HPI 
MSK: negative for back pain, joint pain, muscle pain Breast: negative for breast lumps, nipple discharge, galactorrhea Skin :negative for itching, rash, hives Neuro: negative for dizziness, headache, confusion, weakness Psych: negative for anxiety, depression, change in mood Heme/lymph: negative for bleeding, bruising, pallor Physical Exam 
There were no vitals taken for this visit. Constitutional 
· Appearance: well-nourished, well developed, alert, in no acute distress HENT 
· Head and Face: appears normal 
 
Neck · Inspection/Palpation: normal appearance, no masses or tenderness · Lymph Nodes: no lymphadenopathy present · Thyroid: gland size normal, nontender, no nodules or masses present on palpation Chest 
· Respiratory Effort: breathing unlabored · Auscultation: normal breath sounds Cardiovascular · Heart: 
· Auscultation: regular rate and rhythm without murmur Breasts · Inspection of Breasts: breasts symmetrical, no skin changes, no discharge present, nipple appearance normal, no skin retraction present · Palpation of Breasts and Axillae: no masses present on palpation, no breast tenderness · Axillary Lymph Nodes: no lymphadenopathy present Gastrointestinal 
· Abdominal Examination: abdomen non-tender to palpation, normal bowel sounds, no masses present · Liver and spleen: no hepatomegaly present, spleen not palpable · Hernias: no hernias identified Genitourinary · External Genitalia: normal appearance for age, no discharge present, no tenderness present, no inflammatory lesions present, no masses present · Vagina: normal vaginal vault without central or paravaginal defects, *** discharge present, no inflammatory lesions present, no masses present · Bladder: non-tender to palpation · Urethra: appears normal 
· Cervix: normal  
· Uterus: normal size, shape and consistency · Adnexa: no adnexal tenderness present, no adnexal masses present · Perineum: perineum within normal limits, no evidence of trauma, no rashes or skin lesions present · Anus: anus within normal limits, no hemorrhoids present · Inguinal Lymph Nodes: no lymphadenopathy present Skin · General Inspection: no rash, no lesions identified Neurologic/Psychiatric · Mental Status: · Orientation: grossly oriented to person, place and time · Mood and Affect: mood normal, affect appropriate Assessment: 
35 y.o.  for well woman exam 
No diagnosis found. Plan: The patient was counseled about diet, exercise, healthy lifestyle We discussed self breast exam 
We discussed safer sex practices, condom use and risk factors for sexually transmitted diseases. We discussed current pap smear and HR HPV testing guidelines. We recommend follow up one year for routine annual gynecologic exam or sooner prn We recommend routine follow up with her primary care doctor for management of chronic medical problems and non-gynecologic concerns Handouts were given to the patient We discussed calcium/vitamin D/weight bearing exercise and osteoporosis prevention Folllow up: [x] return for annual well woman exam in one year or sooner if she is having problems 
[] follow up and ultrasound [] 6 months 
[] 3 months 
[] 6 weeks [] 1 month No orders of the defined types were placed in this encounter. No results found for any visits on 03/19/19.

## 2019-03-22 LAB
CYTOLOGIST CVX/VAG CYTO: NORMAL
CYTOLOGY CVX/VAG DOC THIN PREP: NORMAL
CYTOLOGY HISTORY:: NORMAL
DX ICD CODE: NORMAL
HPV I/H RISK 1 DNA CVX QL PROBE+SIG AMP: NEGATIVE
Lab: NORMAL
OTHER STN SPEC: NORMAL
PATH REPORT.FINAL DX SPEC: NORMAL
STAT OF ADQ CVX/VAG CYTO-IMP: NORMAL

## 2021-10-15 ENCOUNTER — OFFICE VISIT (OUTPATIENT)
Dept: OBGYN CLINIC | Age: 36
End: 2021-10-15
Payer: COMMERCIAL

## 2021-10-15 VITALS
DIASTOLIC BLOOD PRESSURE: 94 MMHG | SYSTOLIC BLOOD PRESSURE: 152 MMHG | WEIGHT: 189 LBS | BODY MASS INDEX: 29.6 KG/M2 | HEART RATE: 109 BPM

## 2021-10-15 DIAGNOSIS — N92.0 MENORRHAGIA WITH REGULAR CYCLE: ICD-10-CM

## 2021-10-15 DIAGNOSIS — Z12.4 ENCOUNTER FOR PAPANICOLAOU SMEAR FOR CERVICAL CANCER SCREENING: ICD-10-CM

## 2021-10-15 DIAGNOSIS — Z01.419 ENCOUNTER FOR WELL WOMAN EXAM WITH ROUTINE GYNECOLOGICAL EXAM: Primary | ICD-10-CM

## 2021-10-15 DIAGNOSIS — Z01.411 ENCOUNTER FOR GYNECOLOGICAL EXAMINATION (GENERAL) (ROUTINE) WITH ABNORMAL FINDINGS: ICD-10-CM

## 2021-10-15 DIAGNOSIS — R03.0 ELEVATED BLOOD PRESSURE READING: ICD-10-CM

## 2021-10-15 PROCEDURE — 99395 PREV VISIT EST AGE 18-39: CPT | Performed by: OBSTETRICS & GYNECOLOGY

## 2021-10-15 RX ORDER — AMLODIPINE BESYLATE 5 MG/1
TABLET ORAL
COMMUNITY
Start: 2021-08-12

## 2021-10-15 NOTE — PROGRESS NOTES
[address] [facility]  [email or web address]  [phone]    Galina Torres MD, [address] [facility]       Annual Gynecologic Exam:  WWE <40  Chief Complaint   Patient presents with    Well Woman         Editha Osgood is a 28 y.o.  [address] [facility] female who presents for an annual well woman exam.  Patient's last menstrual period was 10/03/2021. Alberto Edwards She reports the following additional concerns: doing well. Menstrual status:  She does not report dysmenorrhea/painful menses. She does report heavy menses. She does not report irregular bleeding. Sexual history and Contraception:  Social History     Substance and Sexual Activity   Sexual Activity Yes    Partners: Male    Birth control/protection: Condom       She does not reports new sexual partner(s) in the last year. Preventive Medicine History:  Her most recent Pap smear result: normal was obtained in 2019  Her most recent HR HPV screen was Negative obtained in     She does not have a history of NATHAN 2, 3 or cervical cancer. Past Medical History:   Diagnosis Date    Essential hypertension     Family history of Down syndrome 3/27/2014    HX OTHER MEDICAL     gardasil X3    Pap smear for cervical cancer screening 1/15/13; 2016; 3/19/19    negative; negative, HPV negative; Neg/HPV neg     OB History    Para Term  AB Living   2 2 2 0 0 2   SAB TAB Ectopic Molar Multiple Live Births   0 0 0   0 2      # Outcome Date GA Lbr Piter/2nd Weight Sex Delivery Anes PTL Lv   2 Term 18 37w0d  6 lb 9.5 oz (2.99 kg) F Vag-Spont EPIDURAL AN N ARTHUR   1 Term 10/29/14 37w6d  6 lb 2.6 oz (2.795 kg) M VAGINAL DELI EPIDURAL AN N ARTHUR     No past surgical history on file.   Family History   Problem Relation Age of Onset    Breast Cancer Paternal Grandmother     Diabetes Maternal Grandfather     Hypertension Mother     Diabetes Other         uncle    No Known Problems Father      Social History     Socioeconomic History    Marital status:      Spouse name: Not on file    Number of children: Not on file    Years of education: Not on file    Highest education level: Not on file   Occupational History    Not on file   Tobacco Use    Smoking status: Never Smoker    Smokeless tobacco: Never Used   Vaping Use    Vaping Use: Never used   Substance and Sexual Activity    Alcohol use: Yes     Comment: rare    Drug use: No    Sexual activity: Yes     Partners: Male     Birth control/protection: Condom   Other Topics Concern     Service Not Asked    Blood Transfusions Not Asked    Caffeine Concern Not Asked    Occupational Exposure Not Asked    Hobby Hazards Not Asked    Sleep Concern Not Asked    Stress Concern Not Asked    Weight Concern Not Asked    Special Diet Not Asked    Back Care Not Asked    Exercise Not Asked    Bike Helmet Not Asked   [place] Not Asked    Self-Exams Not Asked   Social History Narrative    Not on file     Social Determinants of Health     Financial Resource Strain:     Difficulty of Paying Living Expenses:    Food Insecurity:     Worried About Running Out of Food in the Last Year:     Ran Out of Food in the Last Year:    Transportation Needs:     Lack of Transportation (Medical):  Lack of Transportation (Non-Medical):    Physical Activity:     Days of Exercise per Week:     Minutes of Exercise per Session:    Stress:     Feeling of Stress :    Social Connections:     Frequency of Communication with Friends and Family:     Frequency of Social Gatherings with Friends and Family:     Attends Jain Services:     Active Member of Clubs or Organizations:     Attends Club or Organization Meetings:     Marital Status:    Intimate Partner Violence:     Fear of Current or Ex-Partner:     Emotionally Abused:     Physically Abused:     Sexually Abused:         Allergies   Allergen Reactions    Cefzil [Cefprozil] Hives     Tolerated z-shari, amoxicillin Current Outpatient Medications   Medication Sig    amLODIPine (NORVASC) 5 mg tablet     Cetirizine (ZYRTEC) 10 mg cap Take  by mouth.  CALCIUM PO Take  by mouth.  fish oil-dha-epa 1,200-144-216 mg cap Take  by mouth. (Patient not taking: Reported on 10/15/2021)    PRENATAL VITS W-CA,FE,FA,<1MG, (PRENATAL VITAMIN PO) Take  by mouth. (Patient not taking: Reported on 10/15/2021)     No current facility-administered medications for this visit.        Patient Active Problem List   Diagnosis Code    HTN (hypertension), benign I10    Hyperprolactinemia ([facility] [place] [address] E22.1    Hypertension during pregnancy, antepartum O16.9    Prenatal care of multigravida, antepartum Z34.80    Pre-existing essential hypertension during pregnancy, antepartum O10.019    Hypertension affecting pregnancy, antepartum O16.9    Pregnancy Z34.90         Review of Systems - History obtained from the patient and patient filled out questionnaire   Constitutional/general, HEENT, CV, Resp, GI, MSK, Neuro, Psych, Heme/lymph, Skin, Breast ROS: no significant complaints except as noted on HPI    Physical Exam  Visit Vitals  BP (!) 152/94   Pulse (!) 109   Wt 189 lb (85.7 kg)   LMP 10/03/2021   Breastfeeding No   BMI 29.60 kg/m²       Constitutional  · Appearance: well-nourished, well developed, alert, in no acute distress    HENT  · Head and Face: appears normal    Neck  · Inspection/Palpation: normal appearance, no masses or tenderness  · Lymph Nodes: no lymphadenopathy present  · Thyroid: gland size normal, nontender, no nodules or masses present on palpation    Chest  · Respiratory Effort: breathing unlabored  · Auscultation: normal breath sounds    Cardiovascular  · Heart:  · Auscultation: regular rate and rhythm without murmur    Breasts  · Inspection of Breasts: breasts symmetrical, no skin changes, no discharge present, nipple appearance normal, no skin retraction present  · Palpation of Breasts and Axillae: no masses present on palpation, no breast tenderness  · Axillary Lymph Nodes: no lymphadenopathy present    Gastrointestinal  · Abdominal Examination: abdomen non-tender to palpation, normal bowel sounds, no masses present  · Liver and spleen: no hepatomegaly present, spleen not palpable  · Hernias: no hernias identified    Genitourinary  · External Genitalia: normal appearance for age, no discharge present, no tenderness present, no inflammatory lesions present, no masses present  · Vagina: normal vaginal vault without central or paravaginal defects, no significant discharge present, no inflammatory lesions present, no masses present  · Bladder: non-tender to palpation  · Urethra: appears normal  · Cervix: normal   · Uterus: normal size, shape and consistency  · Adnexa: no adnexal tenderness present, no adnexal masses present  · Perineum: perineum within normal limits, no evidence of trauma, no rashes or skin lesions present  · Anus: anus within normal limits, no hemorrhoids present  · Inguinal Lymph Nodes: no lymphadenopathy present    Skin  · General Inspection: no rash, no lesions identified    Neurologic/Psychiatric  · Mental Status:  · Orientation: grossly oriented to person, place and time  · Mood and Affect: mood normal, affect appropriate    Assessment:  28 y.o.  for well woman exam  Encounter Diagnoses   Name Primary?  Encounter for well woman exam with routine gynecological exam Yes    Encounter for Papanicolaou smear for cervical cancer screening        Plan:  The patient was counseled about diet, exercise, healthy lifestyle  We discussed current pap smear and HR HPV testing guidelines. I recommended follow up one year for routine annual gynecologic exam or sooner prn  Handouts were given to the patient  I recommended follow up with a primary care physician for chronic medical problems and evaluation of non-gynecologic concerns and to please contact our office with any GYN questions or concerns.   I recommended testing per CDC guidelines and at patient request.   Rec BP log, PCP fu   We discussed potential causes of symptomatic bleeding: including but not limited to hormonal, medical, infection/inflammation and structural etiologies. .We discussed options for managing symptoms including but not limited to observation, NSAIDS, hormonal management, IUDs, ablation, and hysterectomy. We discussed safer NSAID dosing for heavy cycles. Pt was advised to take this dose with food and not to take for more than 5 days. Disc RBA including bleeding/gastric irritation. FU MD if NI to discuss other options. FU and US if NI or to discuss options for menstrual cycle managment    Folllow up:  [x] return for annual well woman exam in one year or sooner if she is having problems  [] follow up and ultrasound  [] 6 months  [] 3 months  [] 6 weeks   [] 1 month    No orders of the defined types were placed in this encounter. No results found for any visits on 10/15/21.

## 2021-10-15 NOTE — PATIENT INSTRUCTIONS
Well Visit, Ages 25 to 48: Care Instructions  Overview     Well visits can help you stay healthy. Your doctor has checked your overall health and may have suggested ways to take good care of yourself. Your doctor also may have recommended tests. At home, you can help prevent illness with healthy eating, regular exercise, and other steps. Follow-up care is a key part of your treatment and safety. Be sure to make and go to all appointments, and call your doctor if you are having problems. It's also a good idea to know your test results and keep a list of the medicines you take. How can you care for yourself at home? · Get screening tests that you and your doctor decide on. Screening helps find diseases before any symptoms appear. · Eat healthy foods. Choose fruits, vegetables, whole grains, protein, and low-fat dairy foods. Limit fat, especially saturated fat. Reduce salt in your diet. · Limit alcohol. If you are a man, have no more than 2 drinks a day or 14 drinks a week. If you are a woman, have no more than 1 drink a day or 7 drinks a week. · Get at least 30 minutes of physical activity on most days of the week. Walking is a good choice. You also may want to do other activities, such as running, swimming, cycling, or playing tennis or team sports. Discuss any changes in your exercise program with your doctor. · Reach and stay at a healthy weight. This will lower your risk for many problems, such as obesity, diabetes, heart disease, and high blood pressure. · Do not smoke or allow others to smoke around you. If you need help quitting, talk to your doctor about stop-smoking programs and medicines. These can increase your chances of quitting for good. · Care for your mental health. It is easy to get weighed down by worry and stress. Learn strategies to manage stress, like deep breathing and mindfulness, and stay connected with your family and community.  If you find you often feel sad or hopeless, talk with your doctor. Treatment can help. · Talk to your doctor about whether you have any risk factors for sexually transmitted infections (STIs). You can help prevent STIs if you wait to have sex with a new partner (or partners) until you've each been tested for STIs. It also helps if you use condoms (male or female condoms) and if you limit your sex partners to one person who only has sex with you. Vaccines are available for some STIs, such as HPV. · Use birth control if it's important to you to prevent pregnancy. Talk with your doctor about the choices available and what might be best for you. · If you think you may have a problem with alcohol or drug use, talk to your doctor. This includes prescription medicines (such as amphetamines and opioids) and illegal drugs (such as cocaine and methamphetamine). Your doctor can help you figure out what type of treatment is best for you. · Protect your skin from too much sun. When you're outdoors from 10 a.m. to 4 p.m., stay in the shade or cover up with clothing and a hat with a wide brim. Wear sunglasses that block UV rays. Even when it's cloudy, put broad-spectrum sunscreen (SPF 30 or higher) on any exposed skin. · See a dentist one or two times a year for checkups and to have your teeth cleaned. · Wear a seat belt in the car. When should you call for help? Watch closely for changes in your health, and be sure to contact your doctor if you have any problems or symptoms that concern you. Where can you learn more? Go to http://www.eefoof.com.com/  Enter P072 in the search box to learn more about \"Well Visit, Ages 25 to 48: Care Instructions. \"  Current as of: February 11, 2021               Content Version: 13.0  © 6583-2725 Healthwise, Incorporated. Care instructions adapted under license by [employer] (which disclaims liability or warranty for this information).  If you have questions about a medical condition or this instruction, always ask your healthcare professional. [place] [address] any warranty or liability for your use of this information.

## 2021-10-21 LAB
CYTOLOGIST CVX/VAG CYTO: NORMAL
CYTOLOGY CVX/VAG DOC CYTO: NORMAL
CYTOLOGY CVX/VAG DOC THIN PREP: NORMAL
CYTOLOGY HISTORY:: NORMAL
DX ICD CODE: NORMAL
HPV I/H RISK 4 DNA CVX QL PROBE+SIG AMP: NEGATIVE
Lab: NORMAL
OTHER STN SPEC: NORMAL
STAT OF ADQ CVX/VAG CYTO-IMP: NORMAL

## 2021-10-21 NOTE — PROGRESS NOTES
Normal pap smear, message sent if [facility] active. Update PMH/: include: Date of pap, Cytology: wnl. For HR HPV results: list NEG or POS, when done.

## 2022-03-18 PROBLEM — Z34.80 PRENATAL CARE OF MULTIGRAVIDA, ANTEPARTUM: Status: ACTIVE | Noted: 2018-04-26

## 2022-03-18 PROBLEM — O16.9 HYPERTENSION DURING PREGNANCY, ANTEPARTUM: Status: ACTIVE | Noted: 2018-04-26

## 2022-03-19 PROBLEM — O16.9 HYPERTENSION AFFECTING PREGNANCY, ANTEPARTUM: Status: ACTIVE | Noted: 2018-10-16

## 2022-03-19 PROBLEM — Z34.90 PREGNANCY: Status: ACTIVE | Noted: 2018-11-04

## 2022-03-20 PROBLEM — O10.019 PRE-EXISTING ESSENTIAL HYPERTENSION DURING PREGNANCY, ANTEPARTUM: Status: ACTIVE | Noted: 2018-05-17

## 2022-04-21 ENCOUNTER — OFFICE VISIT (OUTPATIENT)
Dept: OBGYN CLINIC | Age: 37
End: 2022-04-21
Payer: COMMERCIAL

## 2022-04-21 VITALS
SYSTOLIC BLOOD PRESSURE: 150 MMHG | WEIGHT: 190 LBS | HEIGHT: 67 IN | DIASTOLIC BLOOD PRESSURE: 85 MMHG | BODY MASS INDEX: 29.82 KG/M2

## 2022-04-21 DIAGNOSIS — N93.9 ABNORMAL UTERINE BLEEDING (AUB): ICD-10-CM

## 2022-04-21 DIAGNOSIS — N92.4 EXCESSIVE BLEEDING IN PREMENOPAUSAL PERIOD: Primary | ICD-10-CM

## 2022-04-21 PROCEDURE — 99213 OFFICE O/P EST LOW 20 MIN: CPT | Performed by: OBSTETRICS & GYNECOLOGY

## 2022-04-21 RX ORDER — DROSPIRENONE 4 MG/1
1 TABLET, FILM COATED ORAL DAILY
Qty: 90 EACH | Refills: 0 | Status: SHIPPED | OUTPATIENT
Start: 2022-04-21 | End: 2022-05-13 | Stop reason: SDUPTHER

## 2022-04-21 NOTE — PROGRESS NOTES
[address] [facility]  [email or web address]  [phone]    Serena Gorman MD, FACOG       OB/GYN Problem visit    Chief Complaint:   Chief Complaint   Patient presents with    Heavy Period       Last or next WWE is: 10/15/2021    History of Present Illness: This is a new problem being evaluated by this provider. C/o heavy cycles getting worse each month. Ibuprofen protocol worked for about 2 months. First c/o heavy cycles at her AE 10/2021. Mother had hysterectomy for heavy bleeding. LMP she was running to the bathroom q hour changing pad & tampon q hour. Super eavy the first 2 days, then heavy the rest, denies cramping. Reports bigger than quarter clots with every cycle. She is not on anything anything, for birth control.  with vasectomy. No new partners. Declines STD testing. The patient is a 39 y.o.  female. She reports the symptoms are is unchanged. Aggravating factors include none. Alleviating factors include none. She does not have other concerns. LMP: Patient's last menstrual period was 2022. PFSH:  Past Medical History:   Diagnosis Date    Essential hypertension     Family history of Down syndrome 3/27/2014    HX OTHER MEDICAL     gardasil X3    Pap smear for cervical cancer screening 1/15/13; 2016; 3/19/19; 10/15/2021    negative; negative, HPV negative; Neg/HPV neg; neg/hpv neg     History reviewed. No pertinent surgical history.   Family History   Problem Relation Age of Onset    Breast Cancer Paternal Grandmother     Diabetes Maternal Grandfather     Hypertension Mother     Diabetes Other         uncle    No Known Problems Father      Social History     Tobacco Use    Smoking status: Never Smoker    Smokeless tobacco: Never Used   Vaping Use    Vaping Use: Never used   Substance Use Topics    Alcohol use: Yes     Comment: rare    Drug use: No     Allergies   Allergen Reactions    Cefzil [Cefprozil] Hives Tolerated z-shari, amoxicillin     Current Outpatient Medications   Medication Sig    drospirenone, contraceptive, (Slynd) 4 mg (28) tab Take 1 Tablet by mouth daily for 30 days.  amLODIPine (NORVASC) 5 mg tablet     Cetirizine (ZYRTEC) 10 mg cap Take  by mouth.  CALCIUM PO Take  by mouth.  fish oil-dha-epa 1,200-144-216 mg cap Take  by mouth. (Patient not taking: Reported on 10/15/2021)    PRENATAL VITS W-CA,FE,FA,<1MG, (PRENATAL VITAMIN PO) Take  by mouth. (Patient not taking: Reported on 10/15/2021)     No current facility-administered medications for this visit. Review of Systems:  History obtained from the patient  Constitutional: negative for fevers, chills and weight loss  ENT ROS: negative for - hearing change, oral lesions or visual changes  Respiratory: negative for cough, wheezing or dyspnea on exertion  Cardiovascular: negative for chest pain, irregular heart beats, exertional chest pressure/discomfort  Gastrointestinal: negative for dysphagia, nausea and vomiting  Genito-Urinary ROS:  see HPI  Inteument/breast: negative for rash, breast lump and nipple discharge  Musculoskeletal:negative for stiff joints, neck pain and muscle weakness  Endocrine ROS: negative for - breast changes, galactorrhea or temperature intolerance  Hematological and Lymphatic ROS: negative for - blood clots, bruising or swollen lymph nodes    Physical Exam:  Visit Vitals  BP (!) 150/85   Ht 5' 7\" (1.702 m)   Wt 190 lb (86.2 kg)   Breastfeeding No   BMI 29.76 kg/m²       GENERAL: alert, well appearing, and in no distress  HEAD: normocephalic, atraumatic.    PULM: clear to auscultation, no wheezes, rales or rhonchi, symmetric air entry   COR: normal rate and regular rhythm, S1 and S2 normal   ABDOMEN: soft, nontender, nondistended, no masses or organomegaly   EGBUS: no lesions, no inflammation, no masses  VULVA: normal appearing vulva with no masses, tenderness or lesions  VAGINA: normal appearing vagina with normal color, no lesions, without discharge  CERVIX: normal appearing cervix without discharge or lesions, non tender  UTERUS: uterus is normal size, shape, consistency and nontender   ADNEXA: normal adnexa in size, nontender and no masses  NEURO: alert, oriented, normal speech    Assessment:  Encounter Diagnoses   Name Primary?  Excessive bleeding in premenopausal period Yes    Abnormal uterine bleeding (AUB)    Dx: new problem to this provider that is moderate, data reviewed: OB US;  work up planned: labs, intervention: slynd        Plan:  The patient is advised that she should contact the office if she does not note improvement or if symptoms recur  Recommend follow up with PCP for non-gynecologic complaints and chronic medical problems. She should contact our office with any questions or concerns  She could keep her routine annual exam appointment. We discussed potential causes of symptomatic bleeding: including but not limited to hormonal, medical, infection/inflammation and structural etiologies. We discussed safer NSAID dosing for heavy cycles. Pt was advised to take this dose with food and not to take for more than 5 days. Disc RBA including bleeding/gastric irritation. MY ALICEA if NI to discuss other options. Heavy bleeding ho  We discussed options for managing symptoms including but not limited to observation, NSAIDS, hormonal management, IUDs, ablation, and hysterectomy. Plan slynd, consider IUD/ablation: await US  Bleeding precautions  Labs    Orders Placed This Encounter    CBC W/O DIFF    TSH 3RD GENERATION    drospirenone, contraceptive, (Slynd) 4 mg (28) tab       No results found for this visit on 04/21/22.

## 2022-04-22 LAB
ERYTHROCYTE [DISTWIDTH] IN BLOOD BY AUTOMATED COUNT: 13 % (ref 11.7–15.4)
HCT VFR BLD AUTO: 32 % (ref 34–46.6)
HGB BLD-MCNC: 10 G/DL (ref 11.1–15.9)
MCH RBC QN AUTO: 26 PG (ref 26.6–33)
MCHC RBC AUTO-ENTMCNC: 31.3 G/DL (ref 31.5–35.7)
MCV RBC AUTO: 83 FL (ref 79–97)
PLATELET # BLD AUTO: 408 X10E3/UL (ref 150–450)
RBC # BLD AUTO: 3.84 X10E6/UL (ref 3.77–5.28)
TSH SERPL DL<=0.005 MIU/L-ACNC: 3.75 UIU/ML (ref 0.45–4.5)
WBC # BLD AUTO: 6.8 X10E3/UL (ref 3.4–10.8)

## 2022-04-29 NOTE — PROGRESS NOTES
Labs show anemia   Recommend iron per protocol below. Notify patient if not on [address] and add anemia to PMH. Patient should start, or add, an over the counter elemental iron supplement of 45-65 mg. Consider 'Slow FE' or ferrous sulfate 325 mg once a day. If HGB <10, then recommend iron supplement twice a day. Take vitamin C 250 mg and a daily prenatal vitamin to help anemia. Add a stool softener, like docusate or colace 100 mg (2x/day) to avoid constipation. If you do not tolerate supplements you can try blackstrap molasses (1 tbsp=27mg elemental iron).

## 2022-05-09 ENCOUNTER — PATIENT MESSAGE (OUTPATIENT)
Dept: OBGYN CLINIC | Age: 37
End: 2022-05-09

## 2022-05-13 ENCOUNTER — OFFICE VISIT (OUTPATIENT)
Dept: OBGYN CLINIC | Age: 37
End: 2022-05-13

## 2022-05-13 VITALS
HEIGHT: 67 IN | DIASTOLIC BLOOD PRESSURE: 89 MMHG | HEART RATE: 109 BPM | BODY MASS INDEX: 30.86 KG/M2 | SYSTOLIC BLOOD PRESSURE: 130 MMHG | WEIGHT: 196.6 LBS

## 2022-05-13 DIAGNOSIS — N93.9 ABNORMAL UTERINE BLEEDING: Primary | ICD-10-CM

## 2022-05-13 DIAGNOSIS — I10 ESSENTIAL HYPERTENSION: ICD-10-CM

## 2022-05-13 DIAGNOSIS — N92.4 EXCESSIVE BLEEDING IN PREMENOPAUSAL PERIOD: ICD-10-CM

## 2022-05-13 PROBLEM — Z34.80 PRENATAL CARE OF MULTIGRAVIDA, ANTEPARTUM: Status: RESOLVED | Noted: 2018-04-26 | Resolved: 2022-05-13

## 2022-05-13 PROBLEM — O16.9 HYPERTENSION DURING PREGNANCY, ANTEPARTUM: Status: RESOLVED | Noted: 2018-04-26 | Resolved: 2022-05-13

## 2022-05-13 PROBLEM — Z34.90 PREGNANCY: Status: RESOLVED | Noted: 2018-11-04 | Resolved: 2022-05-13

## 2022-05-13 PROBLEM — O10.019 PRE-EXISTING ESSENTIAL HYPERTENSION DURING PREGNANCY, ANTEPARTUM: Status: RESOLVED | Noted: 2018-05-17 | Resolved: 2022-05-13

## 2022-05-13 PROBLEM — O16.9 HYPERTENSION AFFECTING PREGNANCY, ANTEPARTUM: Status: RESOLVED | Noted: 2018-10-16 | Resolved: 2022-05-13

## 2022-05-13 PROCEDURE — 99212 OFFICE O/P EST SF 10 MIN: CPT | Performed by: OBSTETRICS & GYNECOLOGY

## 2022-05-13 RX ORDER — DROSPIRENONE 4 MG/1
1 TABLET, FILM COATED ORAL DAILY
Qty: 90 EACH | Refills: 1 | Status: SHIPPED | OUTPATIENT
Start: 2022-05-13 | End: 2022-06-12

## 2022-05-13 RX ORDER — LANOLIN ALCOHOL/MO/W.PET/CERES
CREAM (GRAM) TOPICAL
COMMUNITY

## 2022-05-13 NOTE — PROGRESS NOTES
[address] [facility]  [email or web address]    Edita Matthews MD, [address] [facility]       OB/GYN Follow-up visit    Chief Complaint: Follow up visit  Chief Complaint   Patient presents with    Ultrasound    Follow-up     Ultrasound:  TRANSVAGINAL ULTRASOUND PERFORMED  UTERUS IS ANTEVERTED, NORMAL IN SIZE AND ECHOGENICITY. ENDOMETRIUM MEASURES 4-5MM IN THICKNESS. NO EVIDENCE OF MASSES OR ABNORMALITIES ARE SEEN. RIGHT OVARY APPEARS WITHIN NORMAL LIMITS. LEFT OVARY APPEARS WITHIN NORMAL LIMITS. NO FREE FLUID SEEN IN THE CDS. History of Present Illness: This is a follow up visit from 4/21/22. She is having a follow up for heavy cycles & Slynd follow up. Last visit pt c/o heavy cycles getting worse each month. Ibuprofen protocol worked for about 2 months. First c/o heavy cycles at her AE 10/2021. Mother had hysterectomy for heavy bleeding. Reports clots bigger than quarters q cycle. Just started slynd and iron. Since [facility] she had a better cycle. Pt likes Slynd & wants a longer term birth control. Interested in IUD. No hx. Mirena info. She reports the symptoms are is better. Aggravating factors include Slynd   Alleviating factors include none. She does not have other concerns. LMP: Patient's last menstrual period was 05/07/2022. PFSH:  Past Medical History:   Diagnosis Date    Essential hypertension     Family history of Down syndrome 3/27/2014    HX OTHER MEDICAL     gardasil X3    Pap smear for cervical cancer screening 1/15/13; 04/04/2016; 3/19/19; 10/15/2021    negative; negative, HPV negative; Neg/HPV neg; neg/hpv neg     History reviewed. No pertinent surgical history.   Family History   Problem Relation Age of Onset    Breast Cancer Paternal Grandmother     Diabetes Maternal Grandfather     Hypertension Mother     Diabetes Other         uncle    No Known Problems Father      Social History     Tobacco Use    Smoking status: Never Smoker    Smokeless tobacco: Never Used   Vaping Use    Vaping Use: Never used   Substance Use Topics    Alcohol use: Yes     Comment: rare    Drug use: No     Allergies   Allergen Reactions    Cefzil [Cefprozil] Hives     Tolerated z-shari, amoxicillin     Current Outpatient Medications   Medication Sig    ferrous sulfate (Iron) 325 mg (65 mg iron) tablet Take  by mouth Daily (before breakfast).  drospirenone, contraceptive, (Slynd) 4 mg (28) tab Take 1 Tablet by mouth daily for 30 days.  amLODIPine (NORVASC) 5 mg tablet     Cetirizine (ZYRTEC) 10 mg cap Take  by mouth.  CALCIUM PO Take  by mouth. No current facility-administered medications for this visit. Review of Systems:  History obtained from the patient  Constitutional: negative for fevers, chills and weight loss  ENT ROS: negative for - hearing change, oral lesions or visual changes  Respiratory: negative for cough, wheezing or dyspnea on exertion  Cardiovascular: negative for chest pain, irregular heart beats, exertional chest pressure/discomfort  Gastrointestinal: negative for dysphagia, nausea and vomiting  Genito-Urinary ROS: no dysuria, trouble voiding, or hematuria  Inteument/breast: negative for rash, breast lump and nipple discharge  Musculoskeletal:negative for stiff joints, neck pain and muscle weakness  Endocrine ROS: negative for - breast changes, galactorrhea or temperature intolerance  Hematological and Lymphatic ROS: negative for - blood clots, bruising or swollen lymph nodes    Physical Exam:  Visit Vitals  /89   Pulse (!) 109   Ht 5' 7\" (1.702 m)   Wt 196 lb 9.6 oz (89.2 kg)   BMI 30.79 kg/m²       GENERAL: alert, well appearing, and in no distress  NEURO: alert, oriented, normal speech    Assessment:  Encounter Diagnoses   Name Primary?     Abnormal uterine bleeding Yes    Excessive bleeding in premenopausal period     Essential hypertension        Plan:  The patient is advised that she should contact the office with any questions or concerns. She should make her routine annual gynecologic appointment if needed. We discussed progesterone only and non hormonal options for contraception including but not limited to condoms, IUDs, Nexplanon, and depo provera. Continue slynd consider IUD. Bleeding precautions  Continue iron   Fu wwe or sooner prn      Orders Placed This Encounter    drospirenone, contraceptive, (Slynd) 4 mg (28) tab       No results found for this visit on 05/13/22. Elena Lucas MD    Physician review of ultrasound performed by technician    Today's ultrasound report and images were reviewed and discussed with the patient.   Please see images and imaging report entered by technician in PACS for more detail and progress note and diagnosis entered by MD.    Danae Pacheco MD

## 2022-05-17 NOTE — PROGRESS NOTES
Written by Smith Hoffmann MD on 4/28/2022 10:21 PM EDT View Full Comments  Seen by patient Rene Alcala on 5/9/2022  9:01 PM

## 2022-10-04 ENCOUNTER — PATIENT MESSAGE (OUTPATIENT)
Dept: OBGYN CLINIC | Age: 37
End: 2022-10-04

## 2022-10-06 ENCOUNTER — OFFICE VISIT (OUTPATIENT)
Dept: OBGYN CLINIC | Age: 37
End: 2022-10-06
Payer: COMMERCIAL

## 2022-10-06 VITALS
DIASTOLIC BLOOD PRESSURE: 80 MMHG | HEART RATE: 128 BPM | HEIGHT: 67 IN | WEIGHT: 202.8 LBS | SYSTOLIC BLOOD PRESSURE: 141 MMHG | BODY MASS INDEX: 31.83 KG/M2

## 2022-10-06 DIAGNOSIS — Z76.89 ENCOUNTER FOR MENSTRUAL REGULATION: ICD-10-CM

## 2022-10-06 DIAGNOSIS — N93.9 ABNORMAL UTERINE BLEEDING: ICD-10-CM

## 2022-10-06 DIAGNOSIS — Z01.419 ENCOUNTER FOR GYNECOLOGICAL EXAMINATION (GENERAL) (ROUTINE) WITHOUT ABNORMAL FINDINGS: Primary | ICD-10-CM

## 2022-10-06 PROCEDURE — 99395 PREV VISIT EST AGE 18-39: CPT | Performed by: OBSTETRICS & GYNECOLOGY

## 2022-10-06 RX ORDER — DROSPIRENONE 4 MG/1
TABLET, FILM COATED ORAL
COMMUNITY
Start: 2022-05-07

## 2022-10-06 NOTE — PROGRESS NOTES
[address] [facility]  [email or web address]  [phone]    Cleveland Allen MD, [address] [facility]     Annual Gynecologic Exam:  WWE <40  Chief Complaint   Patient presents with    Well Woman         Rut Valadez is a 39 y.o.  [address] [facility] female who presents for an annual well woman exam.  Patient's last menstrual period was 2022. Apollo Veronica She reports the following additional concerns: started Slynd on 22    Today is day 13 of pill pack. Cycle in 2022 lasted 2 weeks, avergave/light in flow, with cramping. Pt started OCP to help heavy cycles, she has had lighter bleeding but spotting all the time. Pt has done 6 packs. Open to other options for birth control if needed.  has vasectomy. Menstrual status:  She does report dysmenorrhea/painful menses. She does not report heavy menses. She does report irregular bleeding. Sexual history and Contraception:  Social History     Substance and Sexual Activity   Sexual Activity Yes    Partners: Male    Birth control/protection: Condom       She does not reports new sexual partner(s) in the last year. Preventive Medicine History:  Her most recent Pap smear result: normal was obtained in 2021  Her most recent HR HPV screen was Negative obtained in     She does not have a history of NATHAN 2, 3 or cervical cancer.      Past Medical History:   Diagnosis Date    Essential hypertension     Family history of Down syndrome 3/27/2014    HX OTHER MEDICAL     gardasil X3    Pap smear for cervical cancer screening 1/15/13; 2016; 3/19/19; 10/15/2021    negative; negative, HPV negative; Neg/HPV neg; neg/hpv neg     OB History    Para Term  AB Living   2 2 2 0 0 2   SAB IAB Ectopic Molar Multiple Live Births   0 0 0   0 2      # Outcome Date GA Lbr Piter/2nd Weight Sex Delivery Anes PTL Lv   2 Term 18 37w0d  6 lb 9.5 oz (2.99 kg) F Vag-Spont EPIDURAL AN N ARTHUR   1 Term 10/29/14 37w6d  6 lb 2.6 oz (2.795 kg) M VAGINAL DELI EPIDURAL AN N ARTHUR     History reviewed. No pertinent surgical history. Family History   Problem Relation Age of Onset    Breast Cancer Paternal Grandmother     Diabetes Maternal Grandfather     Hypertension Mother     Diabetes Other         uncle    No Known Problems Father      Social History     Socioeconomic History    Marital status:      Spouse name: Not on file    Number of children: Not on file    Years of education: Not on file    Highest education level: Not on file   Occupational History    Not on file   Tobacco Use    Smoking status: Never    Smokeless tobacco: Never   Vaping Use    Vaping Use: Never used   Substance and Sexual Activity    Alcohol use: Yes     Comment: rare    Drug use: No    Sexual activity: Yes     Partners: Male     Birth control/protection: Condom   Other Topics Concern     Service Not Asked    Blood Transfusions Not Asked    Caffeine Concern Not Asked    Occupational Exposure Not Asked    Hobby Hazards Not Asked    Sleep Concern Not Asked    Stress Concern Not Asked    Weight Concern Not Asked    Special Diet Not Asked    Back Care Not Asked    Exercise Not Asked    Bike Helmet Not Asked    Seat Belt Not Asked    Self-Exams Not Asked   Social History Narrative    Not on file     Social Determinants of Health     Financial Resource Strain: Not on file   Food Insecurity: Not on file   Transportation Needs: Not on file   Physical Activity: Not on file   Stress: Not on file   Social Connections: Not on file   Intimate Partner Violence: Not on file   Housing Stability: Not on file       Allergies   Allergen Reactions    Cefzil [Cefprozil] Hives     Tolerated z-shari, amoxicillin       Current Outpatient Medications   Medication Sig    drospirenone, contraceptive, (Slynd) 4 mg (28) tab     PNV Comb #2-Iron-FA-Omega 3 29-1-400 mg cmpk Take  by mouth. ferrous sulfate 325 mg (65 mg iron) tablet Take  by mouth Daily (before breakfast).     amLODIPine (NORVASC) 5 mg tablet     Cetirizine 10 mg cap Take  by mouth. CALCIUM PO Take  by mouth. No current facility-administered medications for this visit. Patient Active Problem List   Diagnosis Code    HTN (hypertension), benign I10    [facility] ([facility] E22.1         Review of Systems - History obtained from the patient and patient filled out questionnaire   Constitutional/general, HEENT, CV, Resp, GI, MSK, Neuro, Psych, Heme/lymph, Skin, Breast ROS: no significant complaints except as noted on HPI    Physical Exam  Visit Vitals  BP (!) 141/80   Pulse (!) 128   Ht 5' 7\" (1.702 m)   Wt 202 lb 12.8 oz (92 kg)   LMP 09/06/2022   BMI 31.76 kg/m²       Constitutional  Appearance: well-nourished, well developed, alert, in no acute distress    HENT  Head and Face: appears normal    Neck  Inspection/Palpation: normal appearance, no masses or tenderness  Lymph Nodes: no lymphadenopathy present  Thyroid: gland size normal, nontender, no nodules or masses present on palpation    Chest  Respiratory Effort: breathing unlabored  Auscultation: normal breath sounds    Cardiovascular  Heart:   Auscultation: regular rate and rhythm without murmur    Breasts  Inspection of Breasts: breasts symmetrical, no skin changes, no discharge present, nipple appearance normal, no skin retraction present  Palpation of Breasts and Axillae: no masses present on palpation, no breast tenderness  Axillary Lymph Nodes: no lymphadenopathy present    Gastrointestinal  Abdominal Examination: abdomen non-tender to palpation, normal bowel sounds, no masses present  Liver and spleen: no hepatomegaly present, spleen not palpable  Hernias: no hernias identified    Genitourinary  External Genitalia: normal appearance for age, no discharge present, no tenderness present, no inflammatory lesions present, no masses present  Vagina: normal vaginal vault without central or paravaginal defects, no discharge present, no inflammatory lesions present, no masses present  Bladder: non-tender to palpation  Urethra: appears normal  Cervix: normal   Uterus: normal size, shape and consistency  Adnexa: no adnexal tenderness present, no adnexal masses present  Perineum: perineum within normal limits, no evidence of trauma, no rashes or skin lesions present  Anus: anus within normal limits, no hemorrhoids present  Inguinal Lymph Nodes: no lymphadenopathy present    Skin  General Inspection: no rash, no lesions identified    Neurologic/Psychiatric  Mental Status:  Orientation: grossly oriented to person, place and time  Mood and Affect: mood normal, affect appropriate    Assessment:  39 y.o.  for well woman exam  Encounter Diagnoses   Name Primary? Encounter for gynecological examination (general) (routine) without abnormal findings Yes    Abnormal uterine bleeding     Encounter for menstrual regulation        Plan:  The patient was counseled about diet, exercise, healthy lifestyle  We discussed current pap smear and HR HPV testing guidelines. I recommended follow up one year for routine annual gynecologic exam or sooner prn  Handouts were given to the patient  I recommended follow up with a primary care physician for chronic medical problems and evaluation of non-gynecologic concerns and to please contact our office with any GYN questions or concerns. I recommended testing per CDC guidelines and at patient request.   Mirena/larc ho  We discussed progesterone only and non hormonal options for contraception including but not limited to condoms, IUDs, Nexplanon, and depo provera. Disc causes of aub, last us reviewed, likely related to slynd  We discussed elevated blood pressure reading. I recommend BP log/home BP cuff and PCP follow up for additional management/surveillance.       Folllow up:  [x] return for annual well woman exam in one year or sooner if she is having problems  [] follow up and ultrasound  [] 6 months  [] 3 months  [] 6 weeks   [] 1 month    No orders of the defined types were placed in this encounter. No results found for any visits on 10/06/22.

## 2022-10-21 ENCOUNTER — OFFICE VISIT (OUTPATIENT)
Dept: OBGYN CLINIC | Age: 37
End: 2022-10-21
Payer: COMMERCIAL

## 2022-10-21 VITALS — WEIGHT: 208 LBS | DIASTOLIC BLOOD PRESSURE: 90 MMHG | SYSTOLIC BLOOD PRESSURE: 145 MMHG | BODY MASS INDEX: 32.58 KG/M2

## 2022-10-21 DIAGNOSIS — Z76.89 ENCOUNTER FOR MENSTRUAL REGULATION: Primary | ICD-10-CM

## 2022-10-21 DIAGNOSIS — Z30.430 ENCOUNTER FOR IUD INSERTION: ICD-10-CM

## 2022-10-21 DIAGNOSIS — Z01.812 PRE-PROCEDURE LAB EXAM: ICD-10-CM

## 2022-10-21 LAB
HCG URINE, QL. (POC): NEGATIVE
VALID INTERNAL CONTROL?: YES

## 2022-10-21 PROCEDURE — 58300 INSERT INTRAUTERINE DEVICE: CPT | Performed by: OBSTETRICS & GYNECOLOGY

## 2022-10-21 PROCEDURE — 81025 URINE PREGNANCY TEST: CPT | Performed by: OBSTETRICS & GYNECOLOGY

## 2022-10-21 NOTE — PROGRESS NOTES
[address] [facility]  [email or web address]  [phone]    Ace Garrett MD, [address] IUD INSERTION  OFFICE PROCEDURE PROGRESS NOTE    [facility]  OFFICE PROCEDURE PROGRESS NOTE    Chart reviewed for the following:   SUMMER, Fred Hernández LPN, have reviewed the History, Physical and updated the Allergic reactions for [address] performed immediately prior to start of procedure:   Joel Gudino LPN, have performed the following reviews on [facility] prior to the start of the procedure:            * Patient was identified by name and date of birth   * Agreement on procedure being performed was verified  * Risks and Benefits explained to the patient  * Procedure site verified and marked as necessary  * Patient was positioned for comfort  * Consent was signed and verified     Time: 133    Date of procedure: 10/21/2022    Procedure performed by: America Cardoza MD    Provider assisted by:   Fred Hernández LPN    Patient assisted by:   self    How tolerated by patient: tolerated the procedure well with no complications    Post Procedural Pain Scale: 0 - No Hurt    Comments: none        IUD INSERTION PROCEDURE  Last well woman exam: 10/2022. A urine pregnancy test today was Negative. She has not had unprotected intercourse in the last 14 days. Inez Mae is a ,  39 y.o. female WHITE/NON-     LMP: Patient's last menstrual period was 2022., normal    She has a menstrual history positive for abnormal bleeding    She presents for insertion of an IUD. The risks, benefits and alternatives of IUD insertion were discussed in detail and all questions were answered. The patient has reviewed the IUD  provided information and consent. She has elected to proceed with the insertion today and she states she has no further questions. Procedure:   On bimanual exam the uterus was midposition and normal in size with no tenderness present. A speculum was inserted into the vagina and the cervix was visualized. The cervix was prepped with zephiran solution. The anterior lip of the cervix was grasped with a single toothed tenaculum. It was not necessary to dilate the cervix. The uterus was sounded with a pipelle to 7 centimeters and 2cc 1% lidocaine was injected into the cavity. The IUD was then inserted without difficulty. The strings were cut to approximately 3 centimeters and demonstrated to the patient. She experienced a mild  amount of cramping. Post Procedure Status:   She tolerated the procedure with mild discomfort. The patient was observed for 15 minutes after the insertion. There were no complications. Patient was discharged in stable condition. Patient was given routine post-IUD insertion instructions. She was advised to contact the MD for worsening pain, heavy bleeding, unexplained fever, if she cannot feel the IUD strings, if she thinks she may be pregnant, or if she is concerned she may have an STD. We recommend nothing per vagina x 7 days. We recommend follow up in six weeks with US or sooner if needed. The patient received  Mirena IUD, lot number [id number]. The IUD did not come from a [employer]. No results found for any visits on 10/21/22.

## 2022-11-27 ENCOUNTER — PATIENT MESSAGE (OUTPATIENT)
Dept: OBGYN CLINIC | Age: 37
End: 2022-11-27

## 2022-12-05 ENCOUNTER — OFFICE VISIT (OUTPATIENT)
Dept: OBGYN CLINIC | Age: 37
End: 2022-12-05

## 2022-12-05 VITALS — WEIGHT: 209 LBS | SYSTOLIC BLOOD PRESSURE: 142 MMHG | BODY MASS INDEX: 32.73 KG/M2 | DIASTOLIC BLOOD PRESSURE: 94 MMHG

## 2022-12-05 DIAGNOSIS — R03.0 ELEVATED BLOOD PRESSURE READING: ICD-10-CM

## 2022-12-05 DIAGNOSIS — N93.9 ABNORMAL UTERINE BLEEDING: ICD-10-CM

## 2022-12-05 DIAGNOSIS — Z76.89 ENCOUNTER FOR MENSTRUAL REGULATION: Primary | ICD-10-CM

## 2022-12-05 DIAGNOSIS — Z97.5 IUD (INTRAUTERINE DEVICE) IN PLACE: ICD-10-CM

## 2022-12-05 PROCEDURE — 99212 OFFICE O/P EST SF 10 MIN: CPT | Performed by: OBSTETRICS & GYNECOLOGY

## 2022-12-05 PROCEDURE — 3074F SYST BP LT 130 MM HG: CPT | Performed by: OBSTETRICS & GYNECOLOGY

## 2022-12-05 PROCEDURE — 3078F DIAST BP <80 MM HG: CPT | Performed by: OBSTETRICS & GYNECOLOGY

## 2022-12-05 NOTE — PROGRESS NOTES
[address] [facility]  [email or web address]    Regino Wheatley MD, [address] [facility]     OB/GYN Follow-up visit, IUD check with ultrasound    Chief Complaint: Follow up visit  Chief Complaint   Patient presents with    Vaginal Bleeding         History of Present Illness: This is a follow up visit from an IUD insertion. The IUD was inserted on October 21, 2022. She does complain about abnormal bleeding, spotting and brown discharge. Bleeding better than when on slynd/ocp. She does not complain about pain/cramping. She does not have other concerns. [address] [facility] [address]:  Past Medical History:   Diagnosis Date    Encounter for IUD insertion 10/21/2022    mirena due out 2030    Essential hypertension     Family history of Down syndrome 03/27/2014    HX OTHER MEDICAL     gardasil X3    Pap smear for cervical cancer screening 1/15/13; 04/04/2016; 3/19/19; 10/15/2021    negative; negative, HPV negative; Neg/HPV neg; neg/hpv neg     History reviewed. No pertinent surgical history. Family History   Problem Relation Age of Onset    Breast Cancer Paternal Grandmother     Diabetes Maternal Grandfather     Hypertension Mother     Diabetes Other         uncle    No Known Problems Father      Social History     Tobacco Use    Smoking status: Never    Smokeless tobacco: Never   Vaping Use    Vaping Use: Never used   Substance Use Topics    Alcohol use: Yes     Comment: rare    Drug use: No     Allergies   Allergen Reactions    Cefzil [Cefprozil] Hives     Tolerated z-shari, amoxicillin     Current Outpatient Medications   Medication Sig    PNV Comb #2-Iron-FA-Omega 3 29-1-400 mg cmpk Take  by mouth. ferrous sulfate 325 mg (65 mg iron) tablet Take  by mouth Daily (before breakfast). amLODIPine (NORVASC) 5 mg tablet     Cetirizine 10 mg cap Take  by mouth. CALCIUM PO Take  by mouth. drospirenone, contraceptive, (Slynd) 4 mg (28) tab      No current facility-administered medications for this visit.        Review of Systems:  History obtained from the patient  Constitutional: negative for fevers, chills and weight loss  ENT ROS: negative for - hearing change, oral lesions or visual changes  Respiratory: negative for cough, wheezing or dyspnea on exertion  Cardiovascular: negative for chest pain, irregular heart beats, exertional chest pressure/discomfort  Gastrointestinal: negative for dysphagia, nausea and vomiting  Genito-Urinary ROS: no dysuria, trouble voiding, or hematuria  Inteument/breast: negative for rash, breast lump and nipple discharge  Musculoskeletal:negative for stiff joints, neck pain and muscle weakness  Endocrine ROS: negative for - breast changes, galactorrhea or temperature intolerance  Hematological and Lymphatic ROS: negative for - blood clots, bruising or swollen lymph nodes    Physical Exam:  Visit Vitals  BP (!) 142/94   Wt 209 lb (94.8 kg)   BMI 32.73 kg/m²       GENERAL: alert, well appearing, and in no distress  ABDOMEN: soft, nontender, nondistended, no masses or organomegaly   EGBUS: no lesions, no inflammation, no masses  VULVA: normal appearing vulva with no masses, tenderness or lesions  VAGINA: normal appearing vagina with normal color, no lesions, no discharge  CERVIX: normal appearing cervix without discharge or lesions, non tender  IUD strings seen and appropriate length  UTERUS: uterus is normal size, shape, consistency and nontender   ADNEXA: normal adnexa in size, nontender and no masses  NEURO: alert, oriented, normal speech    Assessment:  Encounter Diagnoses   Name Primary? Encounter for menstrual regulation Yes    IUD (intrauterine device) in place     Elevated blood pressure reading     Abnormal uterine bleeding        Plan:  The patient is advised that she should contact the office with any questions or concerns. She should make her routine annual gynecologic appointment if needed. Disc typical sx/bleeding patterns with IUD. Disc how to check IUD strings and screening with AE. Notify MD if any concerns. Disc typical bleeding with IUD and it should improve. Disc safer NSAID dosing  We discussed elevated blood pressure reading. I recommend BP log/home BP cuff and PCP follow up for additional management/surveillance. No orders of the defined types were placed in this encounter. No results found for this visit on 12/05/22. Luan Brittle, MD    Physician review of ultrasound performed by technician  UTERUS IS ANTEVERTED, NORMAL IN SIZE AND ECHOGENICITY. ENDOMETRIUM MEASURES 7-8MM IN THICKNESS. NO EVIDENCE OF MASSES OR ABNORMALITIES ARE SEEN. IUD IS SEEN IN THE PROPER POSITION WITHIN THE ENDOMETRIAL CAVITY IN THE UTERINE FUNDUS. RIGHT OVARY APPEARS WITHIN NORMAL LIMITS. LEFT OVARY APPEARS WITHIN NORMAL LIMITS. NO FREE FLUID SEEN IN THE CDS. Today's ultrasound report and images were reviewed and discussed with the patient.   Please see images and imaging report entered by technician in PACS for more detail and progress note and diagnosis entered by MD.    Leesa Jaramillo MD

## 2023-05-22 RX ORDER — FERROUS SULFATE 325(65) MG
TABLET ORAL
COMMUNITY

## 2023-05-22 RX ORDER — AMLODIPINE BESYLATE 5 MG/1
TABLET ORAL
COMMUNITY
Start: 2021-08-12

## 2024-04-19 ENCOUNTER — OFFICE VISIT (OUTPATIENT)
Age: 39
End: 2024-04-19
Payer: COMMERCIAL

## 2024-04-19 VITALS
HEART RATE: 134 BPM | WEIGHT: 204 LBS | BODY MASS INDEX: 31.95 KG/M2 | DIASTOLIC BLOOD PRESSURE: 89 MMHG | SYSTOLIC BLOOD PRESSURE: 152 MMHG

## 2024-04-19 DIAGNOSIS — Z12.4 CERVICAL CANCER SCREENING: ICD-10-CM

## 2024-04-19 DIAGNOSIS — R03.0 ELEVATED BLOOD PRESSURE READING: ICD-10-CM

## 2024-04-19 DIAGNOSIS — Z01.419 ENCOUNTER FOR GYNECOLOGICAL EXAMINATION: Primary | ICD-10-CM

## 2024-04-19 DIAGNOSIS — Z97.5 IUD (INTRAUTERINE DEVICE) IN PLACE: ICD-10-CM

## 2024-04-19 DIAGNOSIS — Z11.51 SCREENING FOR HUMAN PAPILLOMAVIRUS (HPV): ICD-10-CM

## 2024-04-19 PROCEDURE — 99395 PREV VISIT EST AGE 18-39: CPT | Performed by: OBSTETRICS & GYNECOLOGY

## 2024-04-19 PROCEDURE — 3077F SYST BP >= 140 MM HG: CPT | Performed by: OBSTETRICS & GYNECOLOGY

## 2024-04-19 PROCEDURE — 3079F DIAST BP 80-89 MM HG: CPT | Performed by: OBSTETRICS & GYNECOLOGY

## 2024-04-19 NOTE — PROGRESS NOTES
Caitlyn Harding is a 38 y.o. female returns for an annual exam     Chief Complaint   Patient presents with    Annual Exam       Patient's last menstrual period was 03/25/2024.  Her periods are light in flow and minimal to none with hormone containing IUS.  She does not have dysmenorrhea.  Problems: no problems, happy with IUD  Birth Control: IUD.  Last Pap: 10/15/2021 WNL  She does not have a history of DARELL 2, 3 or cervical cancer.       Examination chaperoned by Dolores Mohr MA.  
distress    HENT  Head and Face: appears normal    Neck  Inspection/Palpation: normal appearance    Breasts  Inspection of Breasts: breasts symmetrical, no skin changes, no discharge present, nipple appearance normal, no skin retraction present  Palpation of Breasts and Axillae: no masses present on palpation, no breast tenderness  Axillary Lymph Nodes: no lymphadenopathy present    Gastrointestinal  Abdominal Examination: abdomen non-tender to palpation, no masses present    Genitourinary  External Genitalia: normal appearance for age  Vagina: normal vaginal vault, bloody discharge present  Bladder: non-tender to palpation  Urethra: appears normal  Cervix: normal, non tender   IUD strings seen and appropriate length    Uterus: normal, non tender  Adnexa: no adnexal tenderness present, no adnexal masses present  Perineum: normal appearing  Anus: normal appearing    Skin  General Inspection: no significant rash    Neurologic/Psychiatric  Mental Status:  Orientation: grossly oriented and alert  Mood and Affect: mood normal, affect appropriate    Assessment:  38 y.o.  for annual gynecologic exam.  Encounter Diagnoses   Name Primary?    Encounter for gynecological examination Yes    Cervical cancer screening     Screening for human papillomavirus (HPV)     Elevated blood pressure reading     IUD (intrauterine device) in place        Plan:  Recommend follow up one year for routine annual gynecologic exam or sooner as needed for any GYN concerns.  Patient should follow up with a primary care physician for chronic medical problems and evaluation of non-gynecologic concerns.  STD testing recommended per CDC guidelines and at patient request.   Follow up: annual gynecologic exam one year.  Continue IUD  Keep pcp fu for HTN      Folllow up:  [x] return for annual well woman exam in one year or sooner if the patient is having problems  [] follow up and ultrasound  [] 6 months  [] 3 months  [] 6 weeks   [] 1 month  [x] 1

## 2024-04-26 LAB
CYTOLOGIST CVX/VAG CYTO: NORMAL
CYTOLOGY CVX/VAG DOC CYTO: NORMAL
CYTOLOGY CVX/VAG DOC THIN PREP: NORMAL
DX ICD CODE: NORMAL
HPV GENOTYPE REFLEX: NORMAL
HPV I/H RISK 4 DNA CVX QL PROBE+SIG AMP: NEGATIVE
Lab: NORMAL
OTHER STN SPEC: NORMAL
STAT OF ADQ CVX/VAG CYTO-IMP: NORMAL

## 2024-11-25 ENCOUNTER — TELEPHONE (OUTPATIENT)
Age: 39
End: 2024-11-25

## 2024-12-04 ENCOUNTER — OFFICE VISIT (OUTPATIENT)
Age: 39
End: 2024-12-04
Payer: COMMERCIAL

## 2024-12-04 VITALS — SYSTOLIC BLOOD PRESSURE: 165 MMHG | BODY MASS INDEX: 32.58 KG/M2 | DIASTOLIC BLOOD PRESSURE: 93 MMHG | WEIGHT: 208 LBS

## 2024-12-04 DIAGNOSIS — Z80.3 FH: BREAST CANCER: ICD-10-CM

## 2024-12-04 DIAGNOSIS — N63.20 MASS OF LEFT BREAST, UNSPECIFIED QUADRANT: Primary | ICD-10-CM

## 2024-12-04 PROCEDURE — 99212 OFFICE O/P EST SF 10 MIN: CPT | Performed by: OBSTETRICS & GYNECOLOGY

## 2024-12-04 PROCEDURE — 3077F SYST BP >= 140 MM HG: CPT | Performed by: OBSTETRICS & GYNECOLOGY

## 2024-12-04 PROCEDURE — 3080F DIAST BP >= 90 MM HG: CPT | Performed by: OBSTETRICS & GYNECOLOGY

## 2024-12-04 NOTE — PROGRESS NOTES
Rooming note, Breast problem visit:    Chief Complaint   Patient presents with    Breast Problem     Pt noticed lump on left breast     Caitlyn Harding is a 39 y.o. female presents for a breast problem visit.    Patient's last menstrual period was 2024.  Birth Control: IUD.    Last Pap: was normal  Last or next WWE is: 2024    This is a new problem being evaluated by this provider.    She has not experienced this problem before.    The patient is a 39 y.o.  female who reports having felt breast lump on the left breast while doing a self breast exam. Patient denies breast pain. She reports mass is dime/audi sized and shaped like an almond.   Patient has family hx of breast cancer : paternal grandmother and paternal aunt    She reports the symptoms are is unchanged.  Aggravating factors include none.   Alleviating factors include none.    .    Last Mammogram Results:  Has never had a mammogram        1. Have you been to the ER, urgent care clinic, or hospitalized since your last visit?No    2. Have you seen or consulted any other health care providers outside of the [facility] since your last visit? No    
03/27/2014    Pap smear for cervical cancer screening 1/15/13; 04/04/2016; 3/19/19; 10/15/2021    negative; negative, HPV negative; Neg/HPV neg; neg/hpv neg    Pap smear for cervical cancer screening 04/19/2024    normal; HPV not tested     History reviewed. No pertinent surgical history.  Family History   Problem Relation Age of Onset    Hypertension Mother     No Known Problems Father     Diabetes Maternal Grandfather     Breast Cancer Paternal Grandmother 70 - 79    Breast Cancer Paternal Aunt 50 - 59            Diabetes Other         uncle     Social History     Tobacco Use    Smoking status: Never    Smokeless tobacco: Never   Substance Use Topics    Alcohol use: Yes    Drug use: No     Allergies   Allergen Reactions    Cefprozil Hives     Tolerated z-eliza, amoxicillin     Current Outpatient Medications   Medication Sig    amLODIPine (NORVASC) 5 MG tablet ceived the following from [facility]: Outside name: amLODIPine (NORVASC) 5 mg tablet    CALCIUM PO Take by mouth (Patient not taking: Reported on 12/4/2024)    Cetirizine HCl 10 MG CAPS Take by mouth (Patient not taking: Reported on 12/4/2024)    ferrous sulfate (IRON 325) 325 (65 Fe) MG tablet Take by mouth every morning (before breakfast) (Patient not taking: Reported on 12/4/2024)     No current facility-administered medications for this visit.           Review of Systems:  History obtained from the patient  Constitutional: negative for fevers, chills and weight loss  ENT ROS: negative for - hearing change, oral lesions or visual changes  Respiratory: negative for cough, wheezing or dyspnea on exertion  Cardiovascular: negative for chest pain, irregular heart beats, exertional chest pressure/discomfort  Gastrointestinal: negative for dysphagia, nausea and vomiting  Genito-Urinary ROS: no dysuria, trouble voiding, or hematuria  Inteument/breast: see HPI  Musculoskeletal:negative for stiff joints, neck pain and muscle weakness  Endocrine

## 2024-12-13 ENCOUNTER — CLINICAL DOCUMENTATION (OUTPATIENT)
Age: 39
End: 2024-12-13

## 2024-12-13 ENCOUNTER — OFFICE VISIT (OUTPATIENT)
Age: 39
End: 2024-12-13

## 2024-12-13 VITALS — BODY MASS INDEX: 31.39 KG/M2 | WEIGHT: 200 LBS | HEIGHT: 67 IN

## 2024-12-13 DIAGNOSIS — N63.20 MASS OF LEFT BREAST, UNSPECIFIED QUADRANT: Primary | ICD-10-CM

## 2024-12-13 NOTE — PROGRESS NOTES
HISTORY OF PRESENT ILLNESS  Caitlyn Harding is a 39 y.o. female     HPI NEW patient consult referred by  Dr. Isabel  for LEFT breast mass. Pt noticed mass on 11/23/24/. States there is no pain, skin changes or nipple inversion. No prior issues with . Chalo recommended US.       Family History:  Paternal grand mother - breast cancer dx age 70's  Paternal aunt - breast cancer dx age 50's         Mammogram, n/a, BIRADS n/a      Review of Systems      Physical Exam       ASSESSMENT and PLAN  {Assessment and Plan Chronic Disease:[id number]}

## 2024-12-13 NOTE — PROGRESS NOTES
[facility]   OFFICE PROCEDURE PROGRESS NOTE        Chart reviewed for the following:   Jarad HERNANDEZ, have reviewed the History, Physical and updated the Allergic reactions for Caitlyn Harding     TIME OUT performed immediately prior to start of procedure:   Jarad HERNANDEZ, have performed the following reviews on Caitlyn Harding prior to the start of the procedure:            * Patient was identified by name and date of birth   * Agreement on procedure being performed was verified  * Risks and Benefits explained to the patient  * Procedure site verified and marked as necessary  * Patient was positioned for comfort  * Consent was signed and verified     Time: 11:15 am      Date of procedure: 12/13/2024    Procedure performed by:  Jarad Casiano MD    Provider assisted by: Nina Arceo MA    Patient assisted by: self    How tolerated by patient: tolerated the procedure well with no complications    Post Procedural Pain Scale: 0    Comments: none

## 2024-12-13 NOTE — PROGRESS NOTES
HISTORY OF PRESENT ILLNESS  Caitlyn Harding is a 39 y.o. female.    HPI  NEW patient consult referred by  Dr. Isabel  for LEFT breast mass. Pt noticed mass on 11/23/24. States there is no pain, skin changes or nipple inversion. No prior issues with . Chalo  recommended US.      Family History:  Paternal grand mother - breast cancer dx age 70's  Paternal aunt - breast cancer dx age 50's      Mammogram, n/a, BIRADS n/a      Past Medical History:   Diagnosis Date    Encounter for IUD insertion 10/21/2022    mirena due out 2030    Essential hypertension     Family history of Down syndrome 03/27/2014    Pap smear for cervical cancer screening 1/15/13; 04/04/2016; 3/19/19; 10/15/2021    negative; negative, HPV negative; Neg/HPV neg; neg/hpv neg    Pap smear for cervical cancer screening 04/19/2024    normal; HPV not tested       No past surgical history on file.    Social History     Socioeconomic History    Marital status:      Spouse name: Not on file    Number of children: Not on file    Years of education: Not on file    Highest education level: Not on file   Occupational History    Not on file   Tobacco Use    Smoking status: Never    Smokeless tobacco: Never   Substance and Sexual Activity    Alcohol use: Yes    Drug use: No    Sexual activity: Yes     Partners: Male     Birth control/protection: Condom   Other Topics Concern    Not on file   Social History Narrative    Not on file     Social Determinants of Health     Financial Resource Strain: Not on file   Food Insecurity: Not on file (2/6/2024)   Transportation Needs: Not on file   Physical Activity: Not on file   Stress: Not on file   Social Connections: Not on file   Intimate Partner Violence: Not on file   Housing Stability: Not on file       Current Outpatient Medications on File Prior to Visit   Medication Sig Dispense Refill    amLODIPine (NORVASC) 5 MG tablet ceived the following from [facility]: Outside name: amLODIPine

## 2024-12-30 DIAGNOSIS — Z91.89 AT HIGH RISK FOR BREAST CANCER: Primary | ICD-10-CM

## 2025-01-07 ENCOUNTER — HOSPITAL ENCOUNTER (OUTPATIENT)
Facility: HOSPITAL | Age: 40
Discharge: HOME | End: 2025-01-10
Attending: SURGERY
Payer: COMMERCIAL

## 2025-01-07 DIAGNOSIS — Z91.89 AT HIGH RISK FOR BREAST CANCER: ICD-10-CM

## 2025-01-07 DIAGNOSIS — Z80.3 FAMILY HISTORY OF BREAST CANCER: Primary | ICD-10-CM

## 2025-01-07 PROCEDURE — A9585 GADOBUTROL INJECTION: HCPCS | Performed by: SURGERY

## 2025-01-07 PROCEDURE — 6360000004 HC RX CONTRAST MEDICATION: Performed by: SURGERY

## 2025-01-07 PROCEDURE — C8908 MRI W/O FOL W/CONT, BREAST,: HCPCS

## 2025-01-07 RX ORDER — GADOBUTROL 604.72 MG/ML
10 INJECTION INTRAVENOUS
Status: COMPLETED | OUTPATIENT
Start: 2025-01-07 | End: 2025-01-07

## 2025-01-07 RX ADMIN — GADOBUTROL 10 ML: 604.72 INJECTION INTRAVENOUS at 10:34

## 2025-01-09 ENCOUNTER — HOSPITAL ENCOUNTER (OUTPATIENT)
Facility: HOSPITAL | Age: 40
Discharge: HOME | End: 2025-01-12
Attending: SURGERY
Payer: COMMERCIAL

## 2025-01-09 VITALS — WEIGHT: 200 LBS | BODY MASS INDEX: 31.32 KG/M2

## 2025-01-09 DIAGNOSIS — Z80.3 FAMILY HISTORY OF BREAST CANCER: ICD-10-CM

## 2025-01-09 PROCEDURE — 77063 BREAST TOMOSYNTHESIS BI: CPT

## 2025-01-10 ENCOUNTER — TELEPHONE (OUTPATIENT)
Age: 40
End: 2025-01-10

## 2025-01-14 ENCOUNTER — TELEPHONE (OUTPATIENT)
Age: 40
End: 2025-01-14